# Patient Record
Sex: FEMALE | Race: WHITE | NOT HISPANIC OR LATINO | Employment: UNEMPLOYED | URBAN - METROPOLITAN AREA
[De-identification: names, ages, dates, MRNs, and addresses within clinical notes are randomized per-mention and may not be internally consistent; named-entity substitution may affect disease eponyms.]

---

## 2018-01-17 NOTE — PROCEDURES
Results/Data    Procedure: Electromyogram and Nerve Conduction Study  Indication: Left Lower Extremity   Referred by Dr Coronel Members  The procedure's were discussed with the patient  Written consent was obtained prior to the procedure and is detailed in the patient's record  Prior to the start of the procedure a time out was taken and the identity of the patient was confirmed via name and date of birth with the patient  The correct site and the procedure to be performed were confirmed  The correct side was confirmed if applicable  The positioning of the patient was verified  The availability of the correct equipment was verified  Procedure Start Time: 1:15    Technique: A sterile concentric needle electrode was used  The patient tolerated the procedure well  There were no complications  Results : Motor and sensory nerve conduction studies were performed on the peroneal , tibial and sural nerves  The peroneal and tibial compound motor action potentials were within normal limits  The peroneal and tibial F wave latencies were within normal limits  The sural sensory peak latency was within normal limits with a low sensory action potential amplitude  The right and left H soleus responses were within normal limits  Concentric needle EMG was performed on various proximal and distal muscles including gluteus medius, vastus lateralis, tibialis anterior, medial gastrocnemius, EDB, L4-5 and L5-S1 paraspinal myotomes  There was no evidence of active denervation in any of the muscles tested  Mild decreased recruitment of giant motor units was noted in the gluteus medius and EDB  The compound motor unit action potentials were of normal configuration with interference patterns being full or full for effort in the remaining muscles tested  Interpretation: There is electro physiologic evidence of a:    1   Mild chronic L5 radiculopathy as evidenced by the decreased recruitment and chronic denervation changes in the above-mentioned muscles  2  The low sural sensory amplitude is of questionable significance  It maybe secondary to technical error  Clinical correlation is recommended               Signatures   Electronically signed by : Abdifatah Vaughan MD; Aug 30 2016  1:52PM EST                       (Author)

## 2019-01-05 LAB — COLOGUARD RESULT REPORTABLE: NEGATIVE

## 2019-07-08 ENCOUNTER — HOSPITAL ENCOUNTER (EMERGENCY)
Facility: HOSPITAL | Age: 59
Discharge: HOME | End: 2019-07-09
Attending: EMERGENCY MEDICINE | Admitting: EMERGENCY MEDICINE
Payer: COMMERCIAL

## 2019-07-08 ENCOUNTER — APPOINTMENT (EMERGENCY)
Dept: RADIOLOGY | Facility: HOSPITAL | Age: 59
End: 2019-07-08
Payer: COMMERCIAL

## 2019-07-08 DIAGNOSIS — R07.9 CHEST PAIN: Primary | ICD-10-CM

## 2019-07-08 LAB
ALBUMIN SERPL BCP-MCNC: 3.5 G/DL (ref 3.5–5)
ALP SERPL-CCNC: 123 U/L (ref 46–116)
ALT SERPL W P-5'-P-CCNC: 29 U/L (ref 12–78)
ANION GAP SERPL CALCULATED.3IONS-SCNC: 7 MMOL/L (ref 4–13)
AST SERPL W P-5'-P-CCNC: 19 U/L (ref 5–45)
BASOPHILS # BLD AUTO: 0.09 THOUSANDS/ΜL (ref 0–0.1)
BASOPHILS NFR BLD AUTO: 1 % (ref 0–1)
BILIRUB SERPL-MCNC: 0.4 MG/DL (ref 0.2–1)
BUN SERPL-MCNC: 13 MG/DL (ref 5–25)
CALCIUM SERPL-MCNC: 8.9 MG/DL (ref 8.3–10.1)
CHLORIDE SERPL-SCNC: 106 MMOL/L (ref 100–108)
CO2 SERPL-SCNC: 29 MMOL/L (ref 21–32)
CREAT SERPL-MCNC: 0.97 MG/DL (ref 0.6–1.3)
DEPRECATED D DIMER PPP: 325 NG/ML (FEU)
EOSINOPHIL # BLD AUTO: 0.24 THOUSAND/ΜL (ref 0–0.61)
EOSINOPHIL NFR BLD AUTO: 3 % (ref 0–6)
ERYTHROCYTE [DISTWIDTH] IN BLOOD BY AUTOMATED COUNT: 15.2 % (ref 11.6–15.1)
GFR SERPL CREATININE-BSD FRML MDRD: 65 ML/MIN/1.73SQ M
GLUCOSE SERPL-MCNC: 73 MG/DL (ref 65–140)
HCT VFR BLD AUTO: 35.2 % (ref 34.8–46.1)
HGB BLD-MCNC: 10.7 G/DL (ref 11.5–15.4)
IMM GRANULOCYTES # BLD AUTO: 0.03 THOUSAND/UL (ref 0–0.2)
IMM GRANULOCYTES NFR BLD AUTO: 0 % (ref 0–2)
LIPASE SERPL-CCNC: 213 U/L (ref 73–393)
LYMPHOCYTES # BLD AUTO: 1.64 THOUSANDS/ΜL (ref 0.6–4.47)
LYMPHOCYTES NFR BLD AUTO: 22 % (ref 14–44)
MCH RBC QN AUTO: 21.3 PG (ref 26.8–34.3)
MCHC RBC AUTO-ENTMCNC: 30.4 G/DL (ref 31.4–37.4)
MCV RBC AUTO: 70 FL (ref 82–98)
MONOCYTES # BLD AUTO: 0.82 THOUSAND/ΜL (ref 0.17–1.22)
MONOCYTES NFR BLD AUTO: 11 % (ref 4–12)
NEUTROPHILS # BLD AUTO: 4.75 THOUSANDS/ΜL (ref 1.85–7.62)
NEUTS SEG NFR BLD AUTO: 63 % (ref 43–75)
NRBC BLD AUTO-RTO: 0 /100 WBCS
PLATELET # BLD AUTO: 296 THOUSANDS/UL (ref 149–390)
PMV BLD AUTO: 10.7 FL (ref 8.9–12.7)
POTASSIUM SERPL-SCNC: 3.7 MMOL/L (ref 3.5–5.3)
PROT SERPL-MCNC: 7 G/DL (ref 6.4–8.2)
RBC # BLD AUTO: 5.03 MILLION/UL (ref 3.81–5.12)
SODIUM SERPL-SCNC: 142 MMOL/L (ref 136–145)
TROPONIN I SERPL-MCNC: <0.02 NG/ML
WBC # BLD AUTO: 7.57 THOUSAND/UL (ref 4.31–10.16)

## 2019-07-08 PROCEDURE — 83690 ASSAY OF LIPASE: CPT | Performed by: EMERGENCY MEDICINE

## 2019-07-08 PROCEDURE — 99284 EMERGENCY DEPT VISIT MOD MDM: CPT | Performed by: EMERGENCY MEDICINE

## 2019-07-08 PROCEDURE — 80053 COMPREHEN METABOLIC PANEL: CPT | Performed by: EMERGENCY MEDICINE

## 2019-07-08 PROCEDURE — 85379 FIBRIN DEGRADATION QUANT: CPT | Performed by: EMERGENCY MEDICINE

## 2019-07-08 PROCEDURE — 84484 ASSAY OF TROPONIN QUANT: CPT | Performed by: EMERGENCY MEDICINE

## 2019-07-08 PROCEDURE — 36415 COLL VENOUS BLD VENIPUNCTURE: CPT | Performed by: EMERGENCY MEDICINE

## 2019-07-08 PROCEDURE — 93005 ELECTROCARDIOGRAM TRACING: CPT

## 2019-07-08 PROCEDURE — 85025 COMPLETE CBC W/AUTO DIFF WBC: CPT | Performed by: EMERGENCY MEDICINE

## 2019-07-08 PROCEDURE — 71046 X-RAY EXAM CHEST 2 VIEWS: CPT

## 2019-07-08 PROCEDURE — 99285 EMERGENCY DEPT VISIT HI MDM: CPT

## 2019-07-08 RX ORDER — 0.9 % SODIUM CHLORIDE 0.9 %
3 VIAL (ML) INJECTION AS NEEDED
Status: DISCONTINUED | OUTPATIENT
Start: 2019-07-08 | End: 2019-07-09 | Stop reason: HOSPADM

## 2019-07-09 VITALS
OXYGEN SATURATION: 99 % | SYSTOLIC BLOOD PRESSURE: 120 MMHG | HEART RATE: 77 BPM | TEMPERATURE: 98.3 F | RESPIRATION RATE: 18 BRPM | DIASTOLIC BLOOD PRESSURE: 55 MMHG

## 2019-07-09 LAB
ATRIAL RATE: 74 BPM
ATRIAL RATE: 87 BPM
P AXIS: 67 DEGREES
P AXIS: 78 DEGREES
PR INTERVAL: 138 MS
PR INTERVAL: 154 MS
QRS AXIS: 66 DEGREES
QRS AXIS: 71 DEGREES
QRSD INTERVAL: 66 MS
QRSD INTERVAL: 70 MS
QT INTERVAL: 348 MS
QT INTERVAL: 384 MS
QTC INTERVAL: 418 MS
QTC INTERVAL: 426 MS
T WAVE AXIS: 69 DEGREES
T WAVE AXIS: 76 DEGREES
TROPONIN I SERPL-MCNC: <0.02 NG/ML
VENTRICULAR RATE: 74 BPM
VENTRICULAR RATE: 87 BPM

## 2019-07-09 PROCEDURE — 84484 ASSAY OF TROPONIN QUANT: CPT | Performed by: EMERGENCY MEDICINE

## 2019-07-09 PROCEDURE — 93010 ELECTROCARDIOGRAM REPORT: CPT | Performed by: INTERNAL MEDICINE

## 2019-07-09 PROCEDURE — 36415 COLL VENOUS BLD VENIPUNCTURE: CPT | Performed by: EMERGENCY MEDICINE

## 2019-07-09 PROCEDURE — 93005 ELECTROCARDIOGRAM TRACING: CPT

## 2019-07-09 NOTE — ED PROVIDER NOTES
History  Chief Complaint   Patient presents with    Chest Pain     Pt presents with c/o constant dull aching CP radiating down L arm that started about , no cardiac hx     HPI  63 y o  female presents with 2 hours of substernal chest pain with radiation down the left arm  Patient describes moderate dull ache that came on suddenly while resting and continues in the ER  Patient states nothing worsens the pain and nothing improves the pain  Patient denies exertional component to her chest pain  Patient notes a history of COPD with mild worsening of her dyspnea that she states is chronic  Patient took her medications today  Patient states she felt her heart was "bounding" but denies palpitations  Patient denies fever/chills, nausea/vomiting, diaphoresis, cough, hemoptysis, weakness, dizziness, back pain, syncope, focal weakness or numbness, leg pain or swelling  All other review of systems reviewed and noted to be negative  The patient denies a history of atherosclerotic disease (CAD/TIA/CVA/PAD)  Patient denies any history of hypertension, affirms hyperlipidemia though this improved with diet and gastric sleeve, denies diabetes; denies any early family history of CAD (male less than 51yo or female less than 73 yo)  The patient denies any use of tobacco in the past 90 days  The patient denies any use of illicit drugs, including cocaine  Patient denies any immobilization of at least 3 days or surgery in the past 4 weeks  Patient denies any history of DVT or PE  Patient affirms any malignancy with treatment within the past 6 months, on letrozol      Medical Decision Making    HEART SCORE  History: 1   -Highly suspicious +2, Moderately suspicious +1   -Likelihood ratios: radiation to both arms (2 6), similar to prior ischemia (2 2), change in pattern over 24 hours (2 0)  EK (ST changes +2, Other abnormalities +1)  Age: 1 (greater 65 +2, 45-65 +1)  Risk factors: 1 (3 or more +2, 1-2 factors +1)  Troponin: 0 (greater than 3 times limit +2)  Total: 3    Patient presenting with chest pain  The patient's HEART score is 3  EKG obtained and reviewed independently by myself, which was NSR without any acute ST/T wave changes  CXR will be obtained to evaluate for alternative pathologies, including pneumothorax or pneumonia  CBC will be obtained to evaluate for leukocytosis suggestive of infectious process such as pneumonia or myocarditis  Janna Meigs' Criteria for Pulmonary Embolism  no - clinical signs or symptoms of DVT (3)  no - PE most likely diagnosis (3)  yes - Heart rate greater than 100 (1 5)  no - Immobilization at least 3 days OR surgery in the previous 4 weeks (1 5)  no - Previous, objectively diagnosed PE or DVT (1 5)  no - Hemoptysis (1)  yes - Malignancy with treatment within 6 months or palliative (1)    PERC Criteria for Pulmonary Embolism  yes - age is greater than or equal to 50  yes - Heart rate greater than 100  no - O2 sat on room air < 95%  no - Prior history of PE or DVT  no - Recent trauma or surgery  no - Hemoptysis  no - Use of exogenous estrogen  no - Unilateral leg swelling    Patient has a low risk Wells' criteria but is unable to be cleared via the [facility] criteria  As such, a D-Dimer will be ordered for the patient to evaluate for the potential for pulmonary embolism  If positive, CT imaging of the patient's chest will be obtained to evaluate for potential pulmonary embolism  CXR obtained and reviewed independently by myself; this did not demonstrate any acute abnormalities  Labs obtained and reviewed and were essentially unremarkable  Initial troponin was negative  Repeated delta troponin and EKG at three hour matthew after initial troponin was negative, indicating a low likelihood of ACS  Laboratory and radiographic findings were discussed with the patient     There is a broad differential and I considered emergent diagnoses including pericarditis, ACS, angina, PE, pneumonia, rib fracture, and PTX, but these appear less likely considering the data gathered thus far  I have instructed the patient to return to the ER at any time if there are any new or worsening symptoms  The patient expressed understanding of and agreement with this plan  Opportunity was given for questions prior to discharge and all stated questions were answered to the patient's satisfaction  Home care instructions provided  History provided by:  Patient  Chest Pain   Pain location:  Substernal area  Pain quality: aching and dull    Pain radiates to:  L arm and L shoulder  Pain severity:  Moderate  Onset quality:  Sudden  Timing:  Constant  Progression:  Unchanged  Relieved by:  None tried  Worsened by:  Nothing tried  Ineffective treatments:  None tried  Associated symptoms: shortness of breath    Associated symptoms: no abdominal pain, no altered mental status, no anorexia, no anxiety, no back pain, no claudication, no cough, no diaphoresis, no dizziness, no dysphagia, no fatigue, no fever, no headache, no heartburn, no lower extremity edema, no nausea, no near-syncope, no numbness, no orthopnea, no palpitations, no PND, no syncope, not vomiting and no weakness        None       Past Medical History:   Diagnosis Date    Emphysema lung (HCC)        Past Surgical History:   Procedure Laterality Date    APPENDECTOMY      CHOLECYSTECTOMY      GASTRIC BYPASS      ROTATOR CUFF REPAIR         History reviewed  No pertinent family history  I have reviewed and agree with the history as documented  Social History     Tobacco Use    Smoking status: Former Smoker    Smokeless tobacco: Never Used   Substance Use Topics    Alcohol use: Yes     Alcohol/week: 14 0 standard drinks     Types: 14 Glasses of wine per week    Drug use: Never        Review of Systems   Constitutional: Negative for diaphoresis, fatigue and fever  HENT: Negative for trouble swallowing      Respiratory: Positive for shortness of breath  Negative for cough  Cardiovascular: Positive for chest pain  Negative for palpitations, orthopnea, claudication, syncope, PND and near-syncope  Gastrointestinal: Negative for abdominal pain, anorexia, heartburn, nausea and vomiting  Musculoskeletal: Negative for back pain  Neurological: Negative for dizziness, weakness, numbness and headaches  Physical Exam  Physical Exam   Constitutional: She appears well-developed and well-nourished  HENT:   Head: Normocephalic and atraumatic  Mouth/Throat: Oropharynx is clear and moist    Eyes: Pupils are equal, round, and reactive to light  Neck: Neck supple  Cardiovascular: Normal rate, regular rhythm, intact distal pulses and normal pulses  Exam reveals no S3    Pulmonary/Chest: Effort normal  No accessory muscle usage or stridor  No tachypnea  No respiratory distress  She has no decreased breath sounds  She has no wheezes  She has no rhonchi  She has no rales  Abdominal: Soft  Musculoskeletal:        Right lower leg: Normal  She exhibits no tenderness and no edema  Left lower leg: Normal  She exhibits no tenderness and no edema  Neurological: She is alert  Skin: Skin is warm and dry  Psychiatric: She has a normal mood and affect  Vitals reviewed        Vital Signs  ED Triage Vitals [07/08/19 2137]   Temperature Pulse Respirations Blood Pressure SpO2   98 3 °F (36 8 °C) 92 20 122/67 97 %      Temp Source Heart Rate Source Patient Position - Orthostatic VS BP Location FiO2 (%)   Oral Monitor Sitting Right arm --      Pain Score       No Pain           Vitals:    07/08/19 2137 07/09/19 0042   BP: 122/67 120/55   Pulse: 92 77   Patient Position - Orthostatic VS: Sitting          Visual Acuity      ED Medications  Medications   sodium chloride (PF) 0 9 % injection 3 mL (has no administration in time range)       Diagnostic Studies  Results Reviewed     Procedure Component Value Units Date/Time    Troponin I [[id number]] (Normal) Collected:  07/09/19 0140    Lab Status:  Final result Specimen:  Blood from Arm, Right Updated:  07/09/19 0203     Troponin I <0 02 ng/mL     Troponin I [[id number]]  (Normal) Collected:  07/08/19 2245    Lab Status:  Final result Specimen:  Blood from Arm, Right Updated:  07/08/19 2311     Troponin I <0 02 ng/mL     Comprehensive metabolic panel [[id number]]  (Abnormal) Collected:  07/08/19 2245    Lab Status:  Final result Specimen:  Blood from Arm, Right Updated:  07/08/19 2309     Sodium 142 mmol/L      Potassium 3 7 mmol/L      Chloride 106 mmol/L      CO2 29 mmol/L      ANION GAP 7 mmol/L      BUN 13 mg/dL      Creatinine 0 97 mg/dL      Glucose 73 mg/dL      Calcium 8 9 mg/dL      AST 19 U/L      ALT 29 U/L      Alkaline Phosphatase 123 U/L      Total Protein 7 0 g/dL      Albumin 3 5 g/dL      Total Bilirubin 0 40 mg/dL      eGFR 65 ml/min/1 73sq m     Narrative:       [facility] guidelines for Chronic Kidney Disease (CKD):     Stage 1 with normal or high GFR (GFR > 90 mL/min/1 73 square meters)    Stage 2 Mild CKD (GFR = 60-89 mL/min/1 73 square meters)    Stage 3A Moderate CKD (GFR = 45-59 mL/min/1 73 square meters)    Stage 3B Moderate CKD (GFR = 30-44 mL/min/1 73 square meters)    Stage 4 Severe CKD (GFR = 15-29 mL/min/1 73 square meters)    Stage 5 End Stage CKD (GFR <15 mL/min/1 73 square meters)  Note: GFR calculation is accurate only with a steady state creatinine    Lipase [[id number]]  (Normal) Collected:  07/08/19 2245    Lab Status:  Final result Specimen:  Blood from Arm, Right Updated:  07/08/19 2309     Lipase 213 u/L     D-dimer, quantitative [[id number]]  (Normal) Collected:  07/08/19 2245    Lab Status:  Final result Specimen:  Blood from Arm, Right Updated:  07/08/19 2306     D-Dimer, Quant 325 ng/ml (FEU)     CBC and differential [[id number]]  (Abnormal) Collected:  07/08/19 2245    Lab Status:  Final result Specimen:  Blood from Arm, Right Updated: 07/08/19 2251     WBC 7 57 Thousand/uL      RBC 5 03 Million/uL      Hemoglobin 10 7 g/dL      Hematocrit 35 2 %      MCV 70 fL      MCH 21 3 pg      MCHC 30 4 g/dL      RDW 15 2 %      MPV 10 7 fL      Platelets 534 Thousands/uL      nRBC 0 /100 WBCs      Neutrophils Relative 63 %      Immat GRANS % 0 %      Lymphocytes Relative 22 %      Monocytes Relative 11 %      Eosinophils Relative 3 %      Basophils Relative 1 %      Neutrophils Absolute 4 75 Thousands/µL      Immature Grans Absolute 0 03 Thousand/uL      Lymphocytes Absolute 1 64 Thousands/µL      Monocytes Absolute 0 82 Thousand/µL      Eosinophils Absolute 0 24 Thousand/µL      Basophils Absolute 0 09 Thousands/µL                  X-ray chest 2 views   ED Interpretation by Sabra Kimbrough MD (07/09 0216)   No acute findings  Procedures  Procedures       ED Course  ED Course as of Jul 09 0217 Mon Jul 08, 2019   2327 EKG demonstrates normal sinus rhythm with no acute ST segment changes  Tue Jul 09, 2019   0146 Repeat EKG with normal sinus rhythm with no acute ST segment changes  0147 Patient currently asymptomatic  Discussed findings with the patient and the need for follow-up  Discussed options regarding stress testing after discussion, she prefers follow-up with cardiology for discussion of stress testing  Discussed return precautions in detail  MDM    Disposition  Final diagnoses:   Chest pain     Time reflects when diagnosis was documented in both MDM as applicable and the Disposition within this note     Time User Action Codes Description Comment    7/9/2019  1:47 AM Ronny Santos Add [R07 9] Chest pain       ED Disposition     ED Disposition Condition Date/Time Comment    Discharge Stable Tue Jul 9, 2019  1:47 AM Carmen Thrasher discharge to home/self care              Follow-up Information     Follow up With Specialties Details Why Contact Info [facility] Schedule an appointment as soon as possible for a visit  Follow-up and reassessment, discussion of stress testing  [address] [address] Cardiology [address] [facility] [address]  [address] [place], [place], Viktoria Carr [address] Emergency Department Emergency Medicine Go to  If symptoms worsen 34 [facility] 149 ED, [address] [place] [address], [facility]          Patient's Medications    No medications on file     No discharge procedures on file      ED Provider  Electronically Signed by           Uriel Gallo MD  07/09/19 8476

## 2019-07-09 NOTE — ED NOTES
Discharged reviewed with pt  Pt verbalized understanding and has no further questions at this time  Pt ambulatory off unit with steady gait       Saniya Teixeira RN  07/09/19 4109

## 2021-03-30 DIAGNOSIS — Z23 ENCOUNTER FOR IMMUNIZATION: ICD-10-CM

## 2021-10-12 ENCOUNTER — TELEPHONE (OUTPATIENT)
Dept: NEUROLOGY | Facility: CLINIC | Age: 61
End: 2021-10-12

## 2021-11-12 ENCOUNTER — OFFICE VISIT (OUTPATIENT)
Dept: FAMILY MEDICINE CLINIC | Facility: CLINIC | Age: 61
End: 2021-11-12
Payer: MEDICARE

## 2021-11-12 ENCOUNTER — TELEPHONE (OUTPATIENT)
Dept: ADMINISTRATIVE | Facility: OTHER | Age: 61
End: 2021-11-12

## 2021-11-12 VITALS
HEIGHT: 63 IN | WEIGHT: 174 LBS | TEMPERATURE: 97.3 F | HEART RATE: 103 BPM | BODY MASS INDEX: 30.83 KG/M2 | OXYGEN SATURATION: 96 % | DIASTOLIC BLOOD PRESSURE: 80 MMHG | SYSTOLIC BLOOD PRESSURE: 118 MMHG

## 2021-11-12 DIAGNOSIS — J44.9 CHRONIC OBSTRUCTIVE PULMONARY DISEASE, UNSPECIFIED COPD TYPE (HCC): ICD-10-CM

## 2021-11-12 DIAGNOSIS — E06.3 HASHIMOTO'S THYROIDITIS: ICD-10-CM

## 2021-11-12 DIAGNOSIS — D32.9 MENINGIOMA (HCC): ICD-10-CM

## 2021-11-12 DIAGNOSIS — R73.03 PREDIABETES: ICD-10-CM

## 2021-11-12 DIAGNOSIS — K21.9 GASTROESOPHAGEAL REFLUX DISEASE, UNSPECIFIED WHETHER ESOPHAGITIS PRESENT: ICD-10-CM

## 2021-11-12 DIAGNOSIS — Z91.09 ENVIRONMENTAL ALLERGIES: ICD-10-CM

## 2021-11-12 DIAGNOSIS — M79.7 FIBROMYALGIA: ICD-10-CM

## 2021-11-12 DIAGNOSIS — Z76.89 ENCOUNTER TO ESTABLISH CARE WITH NEW DOCTOR: Primary | ICD-10-CM

## 2021-11-12 DIAGNOSIS — Z85.3 HX OF BREAST CANCER: ICD-10-CM

## 2021-11-12 DIAGNOSIS — G43.709 CHRONIC MIGRAINE WITHOUT AURA WITHOUT STATUS MIGRAINOSUS, NOT INTRACTABLE: ICD-10-CM

## 2021-11-12 DIAGNOSIS — G89.29 CHRONIC LEFT-SIDED LOW BACK PAIN WITH LEFT-SIDED SCIATICA: ICD-10-CM

## 2021-11-12 DIAGNOSIS — Z12.4 CERVICAL CANCER SCREENING: ICD-10-CM

## 2021-11-12 DIAGNOSIS — M06.00 SERONEGATIVE RHEUMATOID ARTHRITIS (HCC): ICD-10-CM

## 2021-11-12 DIAGNOSIS — R09.82 PND (POST-NASAL DRIP): ICD-10-CM

## 2021-11-12 DIAGNOSIS — M54.2 BILATERAL NECK PAIN: ICD-10-CM

## 2021-11-12 DIAGNOSIS — M54.42 CHRONIC LEFT-SIDED LOW BACK PAIN WITH LEFT-SIDED SCIATICA: ICD-10-CM

## 2021-11-12 DIAGNOSIS — E78.5 HYPERLIPIDEMIA, UNSPECIFIED HYPERLIPIDEMIA TYPE: ICD-10-CM

## 2021-11-12 PROBLEM — E78.2 MIXED HYPERLIPIDEMIA: Status: ACTIVE | Noted: 2018-03-06

## 2021-11-12 PROBLEM — E11.9 DM TYPE 2, GOAL: SYMPTOM MGMT (HCC): Status: ACTIVE | Noted: 2021-11-12

## 2021-11-12 PROBLEM — R73.01 IFG (IMPAIRED FASTING GLUCOSE): Status: ACTIVE | Noted: 2018-01-15

## 2021-11-12 PROBLEM — E11.9 DM TYPE 2, GOAL: SYMPTOM MGMT (HCC): Status: RESOLVED | Noted: 2021-11-12 | Resolved: 2021-11-12

## 2021-11-12 PROCEDURE — 99204 OFFICE O/P NEW MOD 45 MIN: CPT | Performed by: FAMILY MEDICINE

## 2021-11-12 RX ORDER — LETROZOLE 2.5 MG/1
TABLET, FILM COATED ORAL
COMMUNITY
End: 2021-12-16 | Stop reason: ALTCHOICE

## 2021-11-12 RX ORDER — CETIRIZINE HYDROCHLORIDE 10 MG/1
10 TABLET, CHEWABLE ORAL DAILY
Qty: 90 TABLET | Refills: 1 | Status: SHIPPED | OUTPATIENT
Start: 2021-11-12 | End: 2022-04-01

## 2021-11-12 RX ORDER — VENLAFAXINE HYDROCHLORIDE 75 MG/1
1 CAPSULE, EXTENDED RELEASE ORAL DAILY
COMMUNITY
Start: 2021-10-28

## 2021-11-12 RX ORDER — ONDANSETRON 4 MG/1
TABLET, ORALLY DISINTEGRATING ORAL 2 TIMES DAILY PRN
COMMUNITY

## 2021-11-12 RX ORDER — LIDOCAINE AND PRILOCAINE 25; 25 MG/G; MG/G
CREAM TOPICAL
COMMUNITY
Start: 2021-08-10

## 2021-11-12 RX ORDER — ALBUTEROL SULFATE 90 UG/1
2 AEROSOL, METERED RESPIRATORY (INHALATION) EVERY 6 HOURS PRN
COMMUNITY
End: 2021-12-16 | Stop reason: SDUPTHER

## 2021-11-12 RX ORDER — FLUTICASONE PROPIONATE 50 MCG
SPRAY, SUSPENSION (ML) NASAL
COMMUNITY

## 2021-11-12 RX ORDER — GABAPENTIN 300 MG/1
900 CAPSULE ORAL DAILY
COMMUNITY
Start: 2021-10-23

## 2021-11-12 RX ORDER — LETROZOLE 2.5 MG/1
2.5 TABLET, FILM COATED ORAL DAILY
COMMUNITY
Start: 2021-09-23

## 2021-11-12 RX ORDER — IBUPROFEN 800 MG/1
800 TABLET ORAL AS NEEDED
COMMUNITY

## 2021-11-12 RX ORDER — MAGNESIUM OXIDE 400 MG/1
TABLET ORAL 2 TIMES DAILY
COMMUNITY

## 2021-11-12 RX ORDER — PHENTERMINE HYDROCHLORIDE 37.5 MG/1
37.5 CAPSULE ORAL
COMMUNITY
Start: 2021-06-02 | End: 2022-06-17 | Stop reason: ALTCHOICE

## 2021-11-12 RX ORDER — OMEPRAZOLE 40 MG/1
CAPSULE, DELAYED RELEASE ORAL
COMMUNITY
Start: 2021-08-28 | End: 2022-04-04 | Stop reason: SDUPTHER

## 2021-11-12 RX ORDER — CLONAZEPAM 0.5 MG/1
0.5 TABLET ORAL DAILY PRN
COMMUNITY
Start: 2021-06-02

## 2021-11-12 RX ORDER — LEVOTHYROXINE SODIUM 88 UG/1
TABLET ORAL
COMMUNITY
Start: 2021-11-11 | End: 2022-06-17 | Stop reason: ALTCHOICE

## 2021-11-12 RX ORDER — MONTELUKAST SODIUM 10 MG/1
10 TABLET ORAL
COMMUNITY
Start: 2021-06-02

## 2021-11-12 RX ORDER — BIOTIN 1 MG
1000 TABLET ORAL DAILY
COMMUNITY

## 2021-11-12 RX ORDER — PREDNISONE 10 MG/1
10 TABLET ORAL DAILY
COMMUNITY
Start: 2021-10-17 | End: 2022-06-17

## 2021-11-18 ENCOUNTER — TELEPHONE (OUTPATIENT)
Dept: FAMILY MEDICINE CLINIC | Facility: CLINIC | Age: 61
End: 2021-11-18

## 2021-12-03 ENCOUNTER — RA CDI HCC (OUTPATIENT)
Dept: OTHER | Facility: HOSPITAL | Age: 61
End: 2021-12-03

## 2021-12-08 LAB
ALBUMIN SERPL-MCNC: 4.1 G/DL (ref 3.8–4.9)
ALBUMIN/GLOB SERPL: 1.6 {RATIO} (ref 1.2–2.2)
ALP SERPL-CCNC: 86 IU/L (ref 44–121)
ALT SERPL-CCNC: 34 IU/L (ref 0–32)
AST SERPL-CCNC: 20 IU/L (ref 0–40)
BASOPHILS # BLD AUTO: 0.1 X10E3/UL (ref 0–0.2)
BASOPHILS NFR BLD AUTO: 2 %
BILIRUB SERPL-MCNC: 0.4 MG/DL (ref 0–1.2)
BUN SERPL-MCNC: 14 MG/DL (ref 8–27)
BUN/CREAT SERPL: 14 (ref 12–28)
CALCIUM SERPL-MCNC: 8.9 MG/DL (ref 8.7–10.3)
CHLORIDE SERPL-SCNC: 105 MMOL/L (ref 96–106)
CHOLEST SERPL-MCNC: 201 MG/DL (ref 100–199)
CO2 SERPL-SCNC: 23 MMOL/L (ref 20–29)
CREAT SERPL-MCNC: 0.99 MG/DL (ref 0.57–1)
EOSINOPHIL # BLD AUTO: 0.2 X10E3/UL (ref 0–0.4)
EOSINOPHIL NFR BLD AUTO: 4 %
ERYTHROCYTE [DISTWIDTH] IN BLOOD BY AUTOMATED COUNT: 16.2 % (ref 11.7–15.4)
EST. AVERAGE GLUCOSE BLD GHB EST-MCNC: 111 MG/DL
GLOBULIN SER-MCNC: 2.5 G/DL (ref 1.5–4.5)
GLUCOSE SERPL-MCNC: 90 MG/DL (ref 65–99)
HBA1C MFR BLD: 5.5 % (ref 4.8–5.6)
HCT VFR BLD AUTO: 33.1 % (ref 34–46.6)
HDLC SERPL-MCNC: 45 MG/DL
HGB BLD-MCNC: 10.5 G/DL (ref 11.1–15.9)
IMM GRANULOCYTES # BLD: 0 X10E3/UL (ref 0–0.1)
IMM GRANULOCYTES NFR BLD: 0 %
LDLC SERPL CALC-MCNC: 129 MG/DL (ref 0–99)
LYMPHOCYTES # BLD AUTO: 1.5 X10E3/UL (ref 0.7–3.1)
LYMPHOCYTES NFR BLD AUTO: 28 %
MCH RBC QN AUTO: 22.9 PG (ref 26.6–33)
MCHC RBC AUTO-ENTMCNC: 31.7 G/DL (ref 31.5–35.7)
MCV RBC AUTO: 72 FL (ref 79–97)
MONOCYTES # BLD AUTO: 0.5 X10E3/UL (ref 0.1–0.9)
MONOCYTES NFR BLD AUTO: 10 %
NEUTROPHILS # BLD AUTO: 3.1 X10E3/UL (ref 1.4–7)
NEUTROPHILS NFR BLD AUTO: 56 %
PLATELET # BLD AUTO: 310 X10E3/UL (ref 150–450)
POTASSIUM SERPL-SCNC: 4.2 MMOL/L (ref 3.5–5.2)
PROT SERPL-MCNC: 6.6 G/DL (ref 6–8.5)
RBC # BLD AUTO: 4.59 X10E6/UL (ref 3.77–5.28)
SL AMB EGFR AFRICAN AMERICAN: 72 ML/MIN/1.73
SL AMB EGFR NON AFRICAN AMERICAN: 62 ML/MIN/1.73
SL AMB T4, FREE (DIRECT): 1.1 NG/DL (ref 0.82–1.77)
SL AMB VLDL CHOLESTEROL CALC: 27 MG/DL (ref 5–40)
SODIUM SERPL-SCNC: 140 MMOL/L (ref 134–144)
TRIGL SERPL-MCNC: 150 MG/DL (ref 0–149)
TSH SERPL DL<=0.005 MIU/L-ACNC: 9.83 UIU/ML (ref 0.45–4.5)
WBC # BLD AUTO: 5.5 X10E3/UL (ref 3.4–10.8)

## 2021-12-15 ENCOUNTER — CONSULT (OUTPATIENT)
Dept: PAIN MEDICINE | Facility: CLINIC | Age: 61
End: 2021-12-15
Payer: MEDICARE

## 2021-12-15 VITALS
SYSTOLIC BLOOD PRESSURE: 117 MMHG | WEIGHT: 174 LBS | DIASTOLIC BLOOD PRESSURE: 74 MMHG | BODY MASS INDEX: 30.83 KG/M2 | HEART RATE: 89 BPM | HEIGHT: 63 IN

## 2021-12-15 DIAGNOSIS — M54.42 CHRONIC LEFT-SIDED LOW BACK PAIN WITH LEFT-SIDED SCIATICA: Primary | ICD-10-CM

## 2021-12-15 DIAGNOSIS — M79.7 MUSCLE PAIN, FIBROMYALGIA: ICD-10-CM

## 2021-12-15 DIAGNOSIS — G89.4 CHRONIC PAIN SYNDROME: ICD-10-CM

## 2021-12-15 DIAGNOSIS — G89.29 CHRONIC LEFT-SIDED LOW BACK PAIN WITH LEFT-SIDED SCIATICA: Primary | ICD-10-CM

## 2021-12-15 PROCEDURE — 99204 OFFICE O/P NEW MOD 45 MIN: CPT | Performed by: STUDENT IN AN ORGANIZED HEALTH CARE EDUCATION/TRAINING PROGRAM

## 2021-12-16 ENCOUNTER — OFFICE VISIT (OUTPATIENT)
Dept: FAMILY MEDICINE CLINIC | Facility: CLINIC | Age: 61
End: 2021-12-16
Payer: MEDICARE

## 2021-12-16 VITALS
OXYGEN SATURATION: 96 % | DIASTOLIC BLOOD PRESSURE: 74 MMHG | WEIGHT: 172 LBS | HEART RATE: 74 BPM | TEMPERATURE: 97 F | SYSTOLIC BLOOD PRESSURE: 118 MMHG | HEIGHT: 63 IN | BODY MASS INDEX: 30.48 KG/M2

## 2021-12-16 DIAGNOSIS — D32.9 MENINGIOMA (HCC): ICD-10-CM

## 2021-12-16 DIAGNOSIS — Z00.00 WELCOME TO MEDICARE PREVENTIVE VISIT: Primary | ICD-10-CM

## 2021-12-16 DIAGNOSIS — M06.00 SERONEGATIVE RHEUMATOID ARTHRITIS (HCC): ICD-10-CM

## 2021-12-16 DIAGNOSIS — J44.9 CHRONIC OBSTRUCTIVE PULMONARY DISEASE, UNSPECIFIED COPD TYPE (HCC): ICD-10-CM

## 2021-12-16 DIAGNOSIS — K21.9 GASTROESOPHAGEAL REFLUX DISEASE, UNSPECIFIED WHETHER ESOPHAGITIS PRESENT: ICD-10-CM

## 2021-12-16 DIAGNOSIS — R73.03 PREDIABETES: ICD-10-CM

## 2021-12-16 PROCEDURE — G0403 EKG FOR INITIAL PREVENT EXAM: HCPCS | Performed by: FAMILY MEDICINE

## 2021-12-16 PROCEDURE — G0402 INITIAL PREVENTIVE EXAM: HCPCS | Performed by: FAMILY MEDICINE

## 2021-12-16 PROCEDURE — 99214 OFFICE O/P EST MOD 30 MIN: CPT | Performed by: FAMILY MEDICINE

## 2021-12-16 RX ORDER — FOLIC ACID 1 MG/1
1 TABLET ORAL DAILY
COMMUNITY
Start: 2021-12-14

## 2021-12-16 RX ORDER — ALBUTEROL SULFATE 90 UG/1
2 AEROSOL, METERED RESPIRATORY (INHALATION) EVERY 6 HOURS PRN
Qty: 8 G | Refills: 2 | Status: SHIPPED | OUTPATIENT
Start: 2021-12-16 | End: 2022-01-11

## 2021-12-17 ENCOUNTER — TELEPHONE (OUTPATIENT)
Dept: ADMINISTRATIVE | Facility: OTHER | Age: 61
End: 2021-12-17

## 2022-01-10 DIAGNOSIS — J44.9 CHRONIC OBSTRUCTIVE PULMONARY DISEASE, UNSPECIFIED COPD TYPE (HCC): ICD-10-CM

## 2022-01-11 RX ORDER — ALBUTEROL SULFATE 90 UG/1
2 AEROSOL, METERED RESPIRATORY (INHALATION) EVERY 4 HOURS PRN
Qty: 8 G | Refills: 2 | Status: SHIPPED | OUTPATIENT
Start: 2022-01-11 | End: 2022-03-24 | Stop reason: SDUPTHER

## 2022-01-20 ENCOUNTER — HOSPITAL ENCOUNTER (OUTPATIENT)
Dept: MRI IMAGING | Facility: CLINIC | Age: 62
Discharge: HOME | End: 2022-01-20
Payer: MEDICARE

## 2022-01-20 DIAGNOSIS — G89.29 CHRONIC LEFT-SIDED LOW BACK PAIN WITH LEFT-SIDED SCIATICA: ICD-10-CM

## 2022-01-20 DIAGNOSIS — M54.42 CHRONIC LEFT-SIDED LOW BACK PAIN WITH LEFT-SIDED SCIATICA: ICD-10-CM

## 2022-01-20 PROCEDURE — G1004 CDSM NDSC: HCPCS

## 2022-01-20 PROCEDURE — 72148 MRI LUMBAR SPINE W/O DYE: CPT

## 2022-01-24 ENCOUNTER — TELEPHONE (OUTPATIENT)
Dept: RADIOLOGY | Facility: CLINIC | Age: 62
End: 2022-01-24

## 2022-01-24 NOTE — TELEPHONE ENCOUNTER
----- Message from Surendra Porter MD sent at 1/21/2022  4:31 PM EST -----  MRI shows multilevel degenerative changes in the spine  At her L5-S1 level, she has a bone spur that looks like it might be irritating the exiting nerve  She is candidate for left L4 and L5 TFESI which I ordered in case she would like to proceed

## 2022-01-24 NOTE — TELEPHONE ENCOUNTER
Advised pt of same and scheduled injection 2/3 at 10am  Preprocedure instructions reviewed  Denies abx, blood thinners and recent vaccinations

## 2022-01-28 ENCOUNTER — TELEPHONE (OUTPATIENT)
Dept: NEUROLOGY | Facility: CLINIC | Age: 62
End: 2022-01-28

## 2022-01-31 ENCOUNTER — TELEPHONE (OUTPATIENT)
Dept: RADIOLOGY | Facility: CLINIC | Age: 62
End: 2022-01-31

## 2022-02-01 ENCOUNTER — OFFICE VISIT (OUTPATIENT)
Dept: NEUROLOGY | Facility: CLINIC | Age: 62
End: 2022-02-01
Payer: MEDICARE

## 2022-02-01 VITALS
BODY MASS INDEX: 31 KG/M2 | SYSTOLIC BLOOD PRESSURE: 110 MMHG | DIASTOLIC BLOOD PRESSURE: 68 MMHG | HEIGHT: 63 IN | HEART RATE: 104 BPM | TEMPERATURE: 96.4 F | OXYGEN SATURATION: 97 %

## 2022-02-01 DIAGNOSIS — G43.109 MIGRAINE WITH AURA AND WITHOUT STATUS MIGRAINOSUS, NOT INTRACTABLE: Primary | ICD-10-CM

## 2022-02-01 DIAGNOSIS — D32.9 MENINGIOMA (HCC): ICD-10-CM

## 2022-02-01 DIAGNOSIS — E23.7 ABNORMALITY OF PITUITARY GLAND (HCC): ICD-10-CM

## 2022-02-01 DIAGNOSIS — G95.9 CERVICAL MYELOPATHY (HCC): ICD-10-CM

## 2022-02-01 PROCEDURE — 99204 OFFICE O/P NEW MOD 45 MIN: CPT | Performed by: PSYCHIATRY & NEUROLOGY

## 2022-02-01 RX ORDER — SUMATRIPTAN 25 MG/1
TABLET, FILM COATED ORAL
Qty: 9 TABLET | Refills: 5 | Status: SHIPPED | OUTPATIENT
Start: 2022-02-01

## 2022-02-01 NOTE — LETTER
February 1, 2022     [facility], [address] [facility]    Patient: Latha Ann   YOB: 1960   Date of Visit: 2/1/2022       Dear Dr Berenice Tam:    Thank you for referring Latha Ann to me for evaluation  Below are my notes for this consultation  If you have questions, please do not hesitate to call me  I look forward to following your patient along with you  Sincerely,        Bertin Lynn MD        CC: No Recipients  Bertin Lynn MD  2/1/2022  9:47 AM  Incomplete  Latha Ann is a 62 y o  female presents today with a complaint of migraine headache and meningioma    Assessment:  1  Meningioma ([address] )    2  Chronic migraine without aura without status migrainosus, not intractable        Plan:  MRI brain attention pituitary gland   MRI cervical spine   Blood work   Continue Imitrex as needed   Follow-up 3 months    Discussion:  Russell Hui has a history of migraine with aura that presently is occurring about 2 or 3 times a month and finds the 25 mg of Imitrex is effective in aborting her headache quickly  She will continue current management  She had a brain MRI done about 2 and half years ago that demonstrated meningioma in the right frontal region as well as an abnormality in the pituitary gland  Follow-up imaging has not been completed since that time  Have recommended MRI brain attention pituitary with without contrast   She also has findings causing concern for cervical myelopathy and does have neck pain issues  Have recommended MRI of the cervical spine as well and I will see her back in follow-up on these are completed  Subjective:    HPI  Russell Hui is a right-handed woman who presents today with the above complaints  States for the past 3 or 4 years she has been getting headaches    She states her headaches had been preceded by a tunneling of her vision as well as some nausea and dizziness that would last for about 10 or 15 minutes and then be followed by a bifrontal headache  She states that more recently she has not been getting these warnings but typically just gets the headache  She states the headache sometimes lateralizes to 1 side or the other but is typically in the frontal region  She describes a pressure type pain without significant photophobia, sonophobia or nausea  She states that she was seen by a neurologist in the [place] [address] about 2 or 3 years ago and was prescribed Imitrex 25 mg  She has found that this is effective in stopping her headache usually within a half an hour to 1 hour and she tolerates it well  She had been on riboflavin but is currently not taking this  She states that presently she gets about 2-3 headaches per month  Sometimes her headaches are precipitated by neck pain but is otherwise not noted any significant triggering factors  She states that her daughter has a history of bad migraine headaches  She did have an MRI of her brain done 2 and half years ago in the [place] [address] which demonstrated a 6 mm meningioma in the right frontal region as well as a 3 mm abnormality in the pituitary gland consistent with either a cyst or adenoma  She was seen by neurosurgery subsequent to this and follow-up in 2 years was recommended but never completed  She was also seen by Ophthalmology who found narrow angle glaucoma and this was treated with laser  No other ocular abnormalities were noted        Past Medical History:   Diagnosis Date    COPD (chronic obstructive pulmonary disease) (HCC)     Emphysema lung (HCC)     Fibromyalgia     GERD (gastroesophageal reflux disease)     Hashimoto's thyroiditis     History of breast cancer     Rheumatoid arthritis involving multiple sites [facility])        Family History:  Family History   Problem Relation Age of Onset    Skin cancer Mother     Alzheimer's disease Mother     Cirrhosis Father     Coronary artery disease Brother     Cirrhosis Brother  Breast cancer Maternal Grandmother        Past Surgical History:  Past Surgical History:   Procedure Laterality Date    APPENDECTOMY      BREAST LUMPECTOMY Left     CHOLECYSTECTOMY      GASTRIC BYPASS      ROTATOR CUFF REPAIR      SHOULDER SURGERY Right     Muscle tendon transfer        Social History:   reports that she has quit smoking  She has never used smokeless tobacco  She reports current alcohol use of about 14 0 standard drinks of alcohol per week  She reports that she does not use drugs  Allergies:  Ciprofloxacin, Keflex [cephalexin], Penicillins, and Sulfa antibiotics      Current Outpatient Medications:     albuterol (Ventolin HFA) 90 mcg/act inhaler, Inhale 2 puffs every 4 (four) hours as needed for wheezing or shortness of breath, Disp: 8 g, Rfl: 2    Biotin 1000 MCG tablet, Take 1,000 mcg by mouth daily, Disp: , Rfl:     cetirizine (ZyrTEC) 10 MG chewable tablet, Chew 1 tablet (10 mg total) daily, Disp: 90 tablet, Rfl: 1    Cholecalciferol 25 MCG (1000 UT) tablet, Take by mouth, Disp: , Rfl:     clonazePAM (KlonoPIN) 0 5 mg tablet, Take 0 5 mg by mouth, Disp: , Rfl:     Cyanocobalamin (VITAMIN B-12 PO), Take by mouth, Disp: , Rfl:     Diclofenac Epolamine (Flector) 1 3 % PTCH, Flector 1 3 % transdermal 12 hour patch, Disp: , Rfl:     fluticasone (FLONASE) 50 mcg/act nasal spray, fluticasone propionate 50 mcg/act susp, Disp: , Rfl:     folic acid (FOLVITE) 1 mg tablet, Take 1 tablet by mouth daily except on Wednesdays   , Disp: , Rfl:     gabapentin (NEURONTIN) 300 mg capsule, Take 900 mg by mouth daily  , Disp: , Rfl:     ibuprofen (MOTRIN) 800 mg tablet, ibuprofen 800 mg tabs, Disp: , Rfl:     letrozole (FEMARA) 2 5 mg tablet, Take 2 5 mg by mouth daily, Disp: , Rfl:     levothyroxine 88 mcg tablet, , Disp: , Rfl:     lidocaine-prilocaine (EMLA) cream, Apply topically, Disp: , Rfl:     methotrexate 2 5 mg tablet, methotrexate 2 5 mg tabs, Disp: , Rfl:     montelukast (SINGULAIR) 10 mg tablet, Take 10 mg by mouth, Disp: , Rfl:     nystatin-triamcinolone (MYCOLOG-II) cream, nystatin-triamcinolone 100,000 unit/g-0 1 % topical cream, Disp: , Rfl:     omeprazole (PriLOSEC) 40 MG capsule, , Disp: , Rfl:     predniSONE 10 mg tablet, Take 10 mg by mouth daily  , Disp: , Rfl:     venlafaxine (EFFEXOR-XR) 75 mg 24 hr capsule, Take 1 capsule by mouth daily, Disp: , Rfl:     magnesium oxide (MAG-OX) 400 mg tablet, Take by mouth 2 (two) times a day (Patient not taking: Reported on 2/1/2022 ), Disp: , Rfl:     phentermine 37 5 MG capsule, Take 37 5 mg by mouth (Patient not taking: Reported on 12/16/2021 ), Disp: , Rfl:     I have reviewed the past medical, social and family history, current medications, allergies, vitals, review of systems and updated this information as appropriate today     Objective:    Vitals:  Blood pressure 110/68, pulse 104, temperature (!) 96 4 °F (35 8 °C), temperature source Temporal, height 5' 3" (1 6 m), SpO2 97 %, not currently breastfeeding  Physical Exam    Neurological Exam    GENERAL:  Cooperative in no acute distress  Well-developed and well-nourished    HEAD and NECK   Head is atraumatic normocephalic with no lesions or masses  Neck is supple with full range of motion    CARDIOVASCULAR  Carotid Arteries-no carotid bruits  NEUROLOGIC:  Mental Status-the patient is awake alert and oriented without aphasia or apraxia  Cranial Nerves: Visual fields are full to confrontation  Extraocular movements are full without nystagmus  Pupils are 2-1/2 mm and reactive  Face is symmetrical to light touch  Movements of facial expression move symmetrically  Hearing is normal to finger rub bilaterally  Soft palate lifts symmetrically  Shoulder shrug is symmetrical  Tongue is midline without atrophy  Motor: No drift is noted on arm extension  Strength is full in the upper and lower extremities with normal bulk and tone    Sensory: Intact to temperature and vibratory sensation in the upper and lower extremities bilaterally  Cortical function is intact  Coordination: Finger to nose testing is performed accurately with minimal end tremor  Romberg is remarkable for increased sway with eyes closed  Gait reveals a normal base with symmetrical arm swing  Tandem walk is performed with some difficulty, stepping off  Reflexes:  3+ in the biceps, triceps brachioradialis and knee jerk regions with crossed adductors  2+ at the ankles  Toes are +/-upgoing  Matthew sign are positive bilaterally            ROS:    Review of Systems   Constitutional: Negative  Negative for appetite change and fever  HENT: Negative  Negative for hearing loss, tinnitus, trouble swallowing and voice change  Eyes: Negative  Negative for photophobia and pain  Respiratory: Negative  Negative for shortness of breath  Cardiovascular: Negative  Negative for palpitations  Gastrointestinal: Negative  Negative for nausea and vomiting  Endocrine: Negative  Negative for cold intolerance  Genitourinary: Negative  Negative for dysuria, frequency and urgency  Musculoskeletal: Positive for back pain and neck pain  Negative for myalgias  Skin: Negative  Negative for rash  Neurological: Positive for headaches  Negative for dizziness, tremors, seizures, syncope, facial asymmetry, speech difficulty, weakness, light-headedness and numbness  Hematological: Bruises/bleeds easily  Psychiatric/Behavioral: Positive for sleep disturbance  Negative for confusion and hallucinations

## 2022-02-01 NOTE — PROGRESS NOTES
Kavin Pham is a 62 y o  female presents today with a complaint of migraine headache and meningioma    Assessment:  1  Meningioma ([address] )    2  Chronic migraine without aura without status migrainosus, not intractable        Plan:  MRI brain attention pituitary gland   MRI cervical spine   Blood work   Continue Imitrex as needed   Follow-up 3 months    Discussion:  Mona Brown has a history of migraine with aura that presently is occurring about 2 or 3 times a month and finds the 25 mg of Imitrex is effective in aborting her headache quickly  She will continue current management  She had a brain MRI done about 2 and half years ago that demonstrated meningioma in the right frontal region as well as an abnormality in the pituitary gland  Follow-up imaging has not been completed since that time  Have recommended MRI brain attention pituitary with without contrast   She also has findings causing concern for cervical myelopathy and does have neck pain issues  Have recommended MRI of the cervical spine as well and I will see her back in follow-up on these are completed  Subjective:    HPI  Mona Brown is a right-handed woman who presents today with the above complaints  States for the past 3 or 4 years she has been getting headaches  She states her headaches had been preceded by a tunneling of her vision as well as some nausea and dizziness that would last for about 10 or 15 minutes and then be followed by a bifrontal headache  She states that more recently she has not been getting these warnings but typically just gets the headache  She states the headache sometimes lateralizes to 1 side or the other but is typically in the frontal region  She describes a pressure type pain without significant photophobia, sonophobia or nausea  She states that she was seen by a neurologist in the [place] [address] about 2 or 3 years ago and was prescribed Imitrex 25 mg    She has found that this is effective in stopping her headache usually within a half an hour to 1 hour and she tolerates it well  She had been on riboflavin but is currently not taking this  She states that presently she gets about 2-3 headaches per month  Sometimes her headaches are precipitated by neck pain but is otherwise not noted any significant triggering factors  She states that her daughter has a history of bad migraine headaches  She did have an MRI of her brain done 2 and half years ago in the [place] [address] which demonstrated a 6 mm meningioma in the right frontal region as well as a 3 mm abnormality in the pituitary gland consistent with either a cyst or adenoma  She was seen by neurosurgery subsequent to this and follow-up in 2 years was recommended but never completed  She was also seen by Ophthalmology who found narrow angle glaucoma and this was treated with laser  No other ocular abnormalities were noted  Past Medical History:   Diagnosis Date    COPD (chronic obstructive pulmonary disease) (HCC)     Emphysema lung (HCC)     Fibromyalgia     GERD (gastroesophageal reflux disease)     Hashimoto's thyroiditis     History of breast cancer     Rheumatoid arthritis involving multiple sites ([facility] )        Family History:  Family History   Problem Relation Age of Onset    Skin cancer Mother     Alzheimer's disease Mother     Cirrhosis Father     Coronary artery disease Brother     Cirrhosis Brother     Breast cancer Maternal Grandmother        Past Surgical History:  Past Surgical History:   Procedure Laterality Date    APPENDECTOMY      BREAST LUMPECTOMY Left     CHOLECYSTECTOMY      GASTRIC BYPASS      ROTATOR CUFF REPAIR      SHOULDER SURGERY Right     Muscle tendon transfer        Social History:   reports that she has quit smoking  She has never used smokeless tobacco  She reports current alcohol use of about 14 0 standard drinks of alcohol per week  She reports that she does not use drugs      Allergies:  Ciprofloxacin, Keflex [cephalexin], Penicillins, and Sulfa antibiotics      Current Outpatient Medications:     albuterol (Ventolin HFA) 90 mcg/act inhaler, Inhale 2 puffs every 4 (four) hours as needed for wheezing or shortness of breath, Disp: 8 g, Rfl: 2    Biotin 1000 MCG tablet, Take 1,000 mcg by mouth daily, Disp: , Rfl:     cetirizine (ZyrTEC) 10 MG chewable tablet, Chew 1 tablet (10 mg total) daily, Disp: 90 tablet, Rfl: 1    Cholecalciferol 25 MCG (1000 UT) tablet, Take by mouth, Disp: , Rfl:     clonazePAM (KlonoPIN) 0 5 mg tablet, Take 0 5 mg by mouth, Disp: , Rfl:     Cyanocobalamin (VITAMIN B-12 PO), Take by mouth, Disp: , Rfl:     Diclofenac Epolamine (Flector) 1 3 % PTCH, Flector 1 3 % transdermal 12 hour patch, Disp: , Rfl:     fluticasone (FLONASE) 50 mcg/act nasal spray, fluticasone propionate 50 mcg/act susp, Disp: , Rfl:     folic acid (FOLVITE) 1 mg tablet, Take 1 tablet by mouth daily except on Wednesdays   , Disp: , Rfl:     gabapentin (NEURONTIN) 300 mg capsule, Take 900 mg by mouth daily  , Disp: , Rfl:     ibuprofen (MOTRIN) 800 mg tablet, ibuprofen 800 mg tabs, Disp: , Rfl:     letrozole (FEMARA) 2 5 mg tablet, Take 2 5 mg by mouth daily, Disp: , Rfl:     levothyroxine 88 mcg tablet, , Disp: , Rfl:     lidocaine-prilocaine (EMLA) cream, Apply topically, Disp: , Rfl:     methotrexate 2 5 mg tablet, methotrexate 2 5 mg tabs, Disp: , Rfl:     montelukast (SINGULAIR) 10 mg tablet, Take 10 mg by mouth, Disp: , Rfl:     nystatin-triamcinolone (MYCOLOG-II) cream, nystatin-triamcinolone 100,000 unit/g-0 1 % topical cream, Disp: , Rfl:     omeprazole (PriLOSEC) 40 MG capsule, , Disp: , Rfl:     predniSONE 10 mg tablet, Take 10 mg by mouth daily  , Disp: , Rfl:     venlafaxine (EFFEXOR-XR) 75 mg 24 hr capsule, Take 1 capsule by mouth daily, Disp: , Rfl:     magnesium oxide (MAG-OX) 400 mg tablet, Take by mouth 2 (two) times a day (Patient not taking: Reported on 2/1/2022 ), Disp: , Rfl:    phentermine 37 5 MG capsule, Take 37 5 mg by mouth (Patient not taking: Reported on 12/16/2021 ), Disp: , Rfl:     I have reviewed the past medical, social and family history, current medications, allergies, vitals, review of systems and updated this information as appropriate today     Objective:    Vitals:  Blood pressure 110/68, pulse 104, temperature (!) 96 4 °F (35 8 °C), temperature source Temporal, height 5' 3" (1 6 m), SpO2 97 %, not currently breastfeeding  Physical Exam    Neurological Exam    GENERAL:  Cooperative in no acute distress  Well-developed and well-nourished    HEAD and NECK   Head is atraumatic normocephalic with no lesions or masses  Neck is supple with full range of motion    CARDIOVASCULAR  Carotid Arteries-no carotid bruits  NEUROLOGIC:  Mental Status-the patient is awake alert and oriented without aphasia or apraxia  Cranial Nerves: Visual fields are full to confrontation  Extraocular movements are full without nystagmus  Pupils are 2-1/2 mm and reactive  Face is symmetrical to light touch  Movements of facial expression move symmetrically  Hearing is normal to finger rub bilaterally  Soft palate lifts symmetrically  Shoulder shrug is symmetrical  Tongue is midline without atrophy  Motor: No drift is noted on arm extension  Strength is full in the upper and lower extremities with normal bulk and tone  Sensory: Intact to temperature and vibratory sensation in the upper and lower extremities bilaterally  Cortical function is intact  Coordination: Finger to nose testing is performed accurately with minimal end tremor  Romberg is remarkable for increased sway with eyes closed  Gait reveals a normal base with symmetrical arm swing  Tandem walk is performed with some difficulty, stepping off  Reflexes:  3+ in the biceps, triceps brachioradialis and knee jerk regions with crossed adductors  2+ at the ankles  Toes are +/-upgoing    Matthew sign are positive bilaterally            ROS:    Review of Systems   Constitutional: Negative  Negative for appetite change and fever  HENT: Negative  Negative for hearing loss, tinnitus, trouble swallowing and voice change  Eyes: Negative  Negative for photophobia and pain  Respiratory: Negative  Negative for shortness of breath  Cardiovascular: Negative  Negative for palpitations  Gastrointestinal: Negative  Negative for nausea and vomiting  Endocrine: Negative  Negative for cold intolerance  Genitourinary: Negative  Negative for dysuria, frequency and urgency  Musculoskeletal: Positive for back pain and neck pain  Negative for myalgias  Skin: Negative  Negative for rash  Neurological: Positive for headaches  Negative for dizziness, tremors, seizures, syncope, facial asymmetry, speech difficulty, weakness, light-headedness and numbness  Hematological: Bruises/bleeds easily  Psychiatric/Behavioral: Positive for sleep disturbance  Negative for confusion and hallucinations

## 2022-02-03 ENCOUNTER — OFFICE VISIT (OUTPATIENT)
Dept: FAMILY MEDICINE CLINIC | Facility: CLINIC | Age: 62
End: 2022-02-03
Payer: MEDICARE

## 2022-02-03 VITALS
BODY MASS INDEX: 30.16 KG/M2 | HEART RATE: 91 BPM | SYSTOLIC BLOOD PRESSURE: 122 MMHG | WEIGHT: 170.2 LBS | TEMPERATURE: 98.4 F | OXYGEN SATURATION: 97 % | DIASTOLIC BLOOD PRESSURE: 78 MMHG | HEIGHT: 63 IN

## 2022-02-03 DIAGNOSIS — L72.0 EPIDERMOID CYST: Primary | ICD-10-CM

## 2022-02-03 DIAGNOSIS — J44.9 CHRONIC OBSTRUCTIVE PULMONARY DISEASE, UNSPECIFIED COPD TYPE (HCC): ICD-10-CM

## 2022-02-03 DIAGNOSIS — Z12.11 COLON CANCER SCREENING: ICD-10-CM

## 2022-02-03 PROCEDURE — 99214 OFFICE O/P EST MOD 30 MIN: CPT | Performed by: FAMILY MEDICINE

## 2022-02-03 RX ORDER — LEVOTHYROXINE SODIUM 112 UG/1
112 TABLET ORAL DAILY
COMMUNITY
Start: 2022-01-04

## 2022-02-03 NOTE — PROGRESS NOTES
Assessment/Plan:    No problem-specific Assessment & Plan notes found for this encounter  Diagnoses and all orders for this visit:    Epidermoid cyst  No signs of infection    -     Ambulatory Referral to General Surgery; Future    Colon cancer screening  -     Cologuard    Chronic obstructive pulmonary disease, unspecified COPD type ([facility] )  Stable  Breathing is at baseline, using inhaler PRN  Other orders  -     levothyroxine 112 mcg tablet; Take 112 mcg by mouth in the morning        Subjective:      Patient ID: Cecilia Wu is a 62 y o  female  HPI     Noticed a cyst on her back a few weeks ago  She has not done anything for it  Denies drainage  States that it is changed in size, was a little larger and is now flatter  States that when she lays on her back, she feels discomfort  Denies pain at any other time  Denies fever or chills  Has had multiple cyst removed previously  Would like this done, has seen general surgery previously  Breathing is at baseline  Using albuterol prn  Saw neurology, doing MRIs of brain and c spine  The following portions of the patient's history were reviewed and updated as appropriate: allergies, current medications, past family history, past medical history, past social history, past surgical history and problem list     Review of Systems   Constitutional: Negative for chills, fatigue and fever  HENT: Negative for congestion, ear pain, rhinorrhea and sore throat  Respiratory: Negative for cough and shortness of breath  Cardiovascular: Negative for chest pain and leg swelling  Gastrointestinal: Negative for abdominal pain, constipation, diarrhea, nausea and vomiting  Genitourinary: Negative for dysuria, frequency and urgency  Skin: Negative for rash  Neurological: Negative for headaches           Objective:  /78 (BP Location: Left arm, Patient Position: Sitting, Cuff Size: Adult)   Pulse 91   Temp 98 4 °F (36 9 °C)   Ht 5' 3" (1 6 m)   Wt 77 2 kg (170 lb 3 2 oz)   SpO2 97%   BMI 30 15 kg/m²      Physical Exam  Vitals reviewed  Constitutional:       General: She is not in acute distress  Appearance: Normal appearance  HENT:      Head: Normocephalic and atraumatic  Right Ear: External ear normal       Left Ear: External ear normal       Nose: Nose normal       Mouth/Throat:      Mouth: Mucous membranes are moist    Eyes:      Extraocular Movements: Extraocular movements intact  Conjunctiva/sclera: Conjunctivae normal    Cardiovascular:      Heart sounds: Normal heart sounds  Pulmonary:      Effort: Pulmonary effort is normal       Breath sounds: Normal breath sounds  No wheezing, rhonchi or rales  Skin:     General: Skin is warm  Capillary Refill: Capillary refill takes less than 2 seconds  Findings: No rash  Comments: Mid thoracic epidermoid cyst, no erythema  No warmth  No drainage  Neurological:      Mental Status: She is alert  Mental status is at baseline             Lizzy Torres DO  [facility]  2/3/2022 11:34 AM

## 2022-02-08 ENCOUNTER — CONSULT (OUTPATIENT)
Dept: SURGERY | Facility: CLINIC | Age: 62
End: 2022-02-08
Payer: MEDICARE

## 2022-02-08 VITALS
HEART RATE: 96 BPM | BODY MASS INDEX: 30.12 KG/M2 | SYSTOLIC BLOOD PRESSURE: 110 MMHG | RESPIRATION RATE: 16 BRPM | WEIGHT: 170 LBS | TEMPERATURE: 98 F | HEIGHT: 63 IN | OXYGEN SATURATION: 96 % | DIASTOLIC BLOOD PRESSURE: 68 MMHG

## 2022-02-08 DIAGNOSIS — L72.0 EPIDERMOID CYST: ICD-10-CM

## 2022-02-08 PROCEDURE — 99203 OFFICE O/P NEW LOW 30 MIN: CPT | Performed by: SURGERY

## 2022-02-08 NOTE — H&P (VIEW-ONLY)
Assessment/Plan:     1  Epidermoid cyst  -     Ambulatory Referral to General Surgery  -     Case request operating room: EXCISIONAL DEBRIDEMENT; Standing  -     Case request operating room: EXCISIONAL DEBRIDEMENT        We discussed excisional debridement with sedation and local vs local alone  Risks not limited to bleeding and infection  Consent signed  Subjective:      Patient ID: Barbara Jasso is a 62 y o  female  Triage Notes:    61 yo F presenting with an epidermoid cyst on the back  She noticed it about 3 weeks ago  No blood thinners  No recent fevers or chills but did have some drainage yesterday  The following portions of the patient's history were reviewed and updated as appropriate:   She  has a past medical history of COPD (chronic obstructive pulmonary disease) ([facility] ), Emphysema lung ([facility] ), Fibromyalgia, GERD (gastroesophageal reflux disease), Hashimoto's thyroiditis, History of breast cancer, and Rheumatoid arthritis involving multiple sites ([facility] )  She   Patient Active Problem List    Diagnosis Date Noted    Chronic obstructive pulmonary disease, unspecified COPD type ([place] [address] ) 02/03/2022    Seronegative rheumatoid arthritis ([facility] ) 11/12/2021    Muscle pain, fibromyalgia 02/26/2021    Mixed hyperlipidemia 03/06/2018    IFG (impaired fasting glucose) 01/15/2018     She  has a past surgical history that includes Appendectomy; Cholecystectomy; Rotator cuff repair; Gastric bypass; Breast lumpectomy (Left); and Shoulder surgery (Right)  Her family history includes Alzheimer's disease in her mother; Breast cancer in her maternal grandmother; Cirrhosis in her brother and father; Coronary artery disease in her brother; Skin cancer in her mother  She  reports that she has quit smoking  She has never used smokeless tobacco  She reports current alcohol use of about 14 0 standard drinks of alcohol per week  She reports that she does not use drugs    Current Outpatient Medications on File Prior to Visit   Medication Sig    albuterol (Ventolin HFA) 90 mcg/act inhaler Inhale 2 puffs every 4 (four) hours as needed for wheezing or shortness of breath    Biotin 1000 MCG tablet Take 1,000 mcg by mouth daily    cetirizine (ZyrTEC) 10 MG chewable tablet Chew 1 tablet (10 mg total) daily    Cholecalciferol 25 MCG (1000 UT) tablet Take by mouth    clonazePAM (KlonoPIN) 0 5 mg tablet Take 0 5 mg by mouth      Cyanocobalamin (VITAMIN B-12 PO) Take by mouth    Diclofenac Epolamine (Flector) 1 3 % PTCH Flector 1 3 % transdermal 12 hour patch    fluticasone (FLONASE) 50 mcg/act nasal spray fluticasone propionate 50 mcg/act susp    folic acid (FOLVITE) 1 mg tablet Take 1 tablet by mouth daily except on Wednesdays   gabapentin (NEURONTIN) 300 mg capsule Take 900 mg by mouth daily      ibuprofen (MOTRIN) 800 mg tablet ibuprofen 800 mg tabs    letrozole (FEMARA) 2 5 mg tablet Take 2 5 mg by mouth daily    levothyroxine 112 mcg tablet Take 112 mcg by mouth in the morning    lidocaine-prilocaine (EMLA) cream Apply topically    magnesium oxide (MAG-OX) 400 mg tablet Take by mouth 2 (two) times a day      methotrexate 2 5 mg tablet methotrexate 2 5 mg tabs    montelukast (SINGULAIR) 10 mg tablet Take 10 mg by mouth    nystatin-triamcinolone (MYCOLOG-II) cream nystatin-triamcinolone 100,000 unit/g-0 1 % topical cream    omeprazole (PriLOSEC) 40 MG capsule     predniSONE 10 mg tablet Take 10 mg by mouth daily      SUMAtriptan (Imitrex) 25 mg tablet 1 p o  at headache onset, may repeat 1 after 2 hours p r n     venlafaxine (EFFEXOR-XR) 75 mg 24 hr capsule Take 1 capsule by mouth daily    levothyroxine 88 mcg tablet  (Patient not taking: Reported on 2/3/2022 )    phentermine 37 5 MG capsule Take 37 5 mg by mouth (Patient not taking: Reported on 12/16/2021 )     No current facility-administered medications on file prior to visit       She is allergic to ciprofloxacin, keflex [cephalexin], penicillins, and sulfa antibiotics       Review of Systems   Constitutional: Negative for activity change and appetite change  HENT: Negative for congestion, hearing loss, sore throat and trouble swallowing  Eyes: Negative for discharge and visual disturbance  Respiratory: Negative for cough, chest tightness, shortness of breath and wheezing  Cardiovascular: Negative for chest pain and palpitations  Gastrointestinal: Negative for abdominal pain  Endocrine: Negative for cold intolerance and heat intolerance  Genitourinary: Negative for difficulty urinating  Musculoskeletal: Negative for gait problem  Skin: Positive for wound  Negative for color change and rash  Neurological: Negative for dizziness, speech difficulty and headaches  Psychiatric/Behavioral: Negative for behavioral problems and confusion  The patient is not nervous/anxious  Objective:      /68   Pulse 96   Temp 98 °F (36 7 °C) (Temporal)   Resp 16   Ht 5' 3" (1 6 m)   Wt 77 1 kg (170 lb)   SpO2 96%   BMI 30 11 kg/m²     Below is the patient's most recent value for Albumin, ALT, AST, BUN, Calcium, Chloride, Cholesterol, CO2, Creatinine, GFR, Glucose, HDL, Hematocrit, Hemoglobin, Hemoglobin A1C, LDL, Magnesium, Phosphorus, Platelets, Potassium, PSA, Sodium, Triglycerides, and WBC  Lab Results   Component Value Date    ALT 34 (H) 12/07/2021    AST 20 12/07/2021    BUN 14 12/07/2021    CALCIUM 8 9 07/08/2019     12/07/2021    CO2 23 12/07/2021    CREATININE 0 99 12/07/2021    HDL 45 12/07/2021    HCT 33 1 (L) 12/07/2021    HGB 10 5 (L) 12/07/2021    HGBA1C 5 5 12/07/2021     12/07/2021    K 4 2 12/07/2021    TRIG 150 (H) 12/07/2021    WBC 5 5 12/07/2021     Note: for a comprehensive list of the patient's lab results, access the Results Review activity  Physical Exam  Vitals and nursing note reviewed  Constitutional:       General: She is not in acute distress  Appearance: She is well-developed   She is not diaphoretic  HENT:      Head: Normocephalic and atraumatic  Eyes:      Pupils: Pupils are equal, round, and reactive to light  Cardiovascular:      Rate and Rhythm: Normal rate and regular rhythm  Pulmonary:      Effort: Pulmonary effort is normal  No respiratory distress  Abdominal:      Palpations: Abdomen is soft  Musculoskeletal:         General: Normal range of motion  Cervical back: Normal range of motion and neck supple  Skin:     General: Skin is warm and dry  Comments: On her upper mid back there is a lesion with two punctums without significant drainage with surrounding rubor and induration  Neurological:      Mental Status: She is alert and oriented to person, place, and time  Psychiatric:         Mood and Affect: Mood normal          Behavior: Behavior normal          Thought Content:  Thought content normal          Judgment: Judgment normal              Procedures

## 2022-02-08 NOTE — PROGRESS NOTES
Assessment/Plan:     1  Epidermoid cyst  -     Ambulatory Referral to General Surgery  -     Case request operating room: EXCISIONAL DEBRIDEMENT; Standing  -     Case request operating room: EXCISIONAL DEBRIDEMENT        We discussed excisional debridement with sedation and local vs local alone  Risks not limited to bleeding and infection  Consent signed  Subjective:      Patient ID: Roger Bingham is a 62 y o  female  Triage Notes:    63 yo F presenting with an epidermoid cyst on the back  She noticed it about 3 weeks ago  No blood thinners  No recent fevers or chills but did have some drainage yesterday  The following portions of the patient's history were reviewed and updated as appropriate:   She  has a past medical history of COPD (chronic obstructive pulmonary disease) ([facility] ), Emphysema lung ([facility] ), Fibromyalgia, GERD (gastroesophageal reflux disease), Hashimoto's thyroiditis, History of breast cancer, and Rheumatoid arthritis involving multiple sites ([place] [address] )  She   Patient Active Problem List    Diagnosis Date Noted    Chronic obstructive pulmonary disease, unspecified COPD type ([place] [address] ) 02/03/2022    Seronegative rheumatoid arthritis ([place] [address] ) 11/12/2021    Muscle pain, fibromyalgia 02/26/2021    Mixed hyperlipidemia 03/06/2018    IFG (impaired fasting glucose) 01/15/2018     She  has a past surgical history that includes Appendectomy; Cholecystectomy; Rotator cuff repair; Gastric bypass; Breast lumpectomy (Left); and Shoulder surgery (Right)  Her family history includes Alzheimer's disease in her mother; Breast cancer in her maternal grandmother; Cirrhosis in her brother and father; Coronary artery disease in her brother; Skin cancer in her mother  She  reports that she has quit smoking  She has never used smokeless tobacco  She reports current alcohol use of about 14 0 standard drinks of alcohol per week  She reports that she does not use drugs    Current Outpatient Medications on File Prior to Visit   Medication Sig    albuterol (Ventolin HFA) 90 mcg/act inhaler Inhale 2 puffs every 4 (four) hours as needed for wheezing or shortness of breath    Biotin 1000 MCG tablet Take 1,000 mcg by mouth daily    cetirizine (ZyrTEC) 10 MG chewable tablet Chew 1 tablet (10 mg total) daily    Cholecalciferol 25 MCG (1000 UT) tablet Take by mouth    clonazePAM (KlonoPIN) 0 5 mg tablet Take 0 5 mg by mouth      Cyanocobalamin (VITAMIN B-12 PO) Take by mouth    Diclofenac Epolamine (Flector) 1 3 % PTCH Flector 1 3 % transdermal 12 hour patch    fluticasone (FLONASE) 50 mcg/act nasal spray fluticasone propionate 50 mcg/act susp    folic acid (FOLVITE) 1 mg tablet Take 1 tablet by mouth daily except on Wednesdays   gabapentin (NEURONTIN) 300 mg capsule Take 900 mg by mouth daily      ibuprofen (MOTRIN) 800 mg tablet ibuprofen 800 mg tabs    letrozole (FEMARA) 2 5 mg tablet Take 2 5 mg by mouth daily    levothyroxine 112 mcg tablet Take 112 mcg by mouth in the morning    lidocaine-prilocaine (EMLA) cream Apply topically    magnesium oxide (MAG-OX) 400 mg tablet Take by mouth 2 (two) times a day      methotrexate 2 5 mg tablet methotrexate 2 5 mg tabs    montelukast (SINGULAIR) 10 mg tablet Take 10 mg by mouth    nystatin-triamcinolone (MYCOLOG-II) cream nystatin-triamcinolone 100,000 unit/g-0 1 % topical cream    omeprazole (PriLOSEC) 40 MG capsule     predniSONE 10 mg tablet Take 10 mg by mouth daily      SUMAtriptan (Imitrex) 25 mg tablet 1 p o  at headache onset, may repeat 1 after 2 hours p r n     venlafaxine (EFFEXOR-XR) 75 mg 24 hr capsule Take 1 capsule by mouth daily    levothyroxine 88 mcg tablet  (Patient not taking: Reported on 2/3/2022 )    phentermine 37 5 MG capsule Take 37 5 mg by mouth (Patient not taking: Reported on 12/16/2021 )     No current facility-administered medications on file prior to visit       She is allergic to ciprofloxacin, keflex [cephalexin], penicillins, and sulfa antibiotics       Review of Systems   Constitutional: Negative for activity change and appetite change  HENT: Negative for congestion, hearing loss, sore throat and trouble swallowing  Eyes: Negative for discharge and visual disturbance  Respiratory: Negative for cough, chest tightness, shortness of breath and wheezing  Cardiovascular: Negative for chest pain and palpitations  Gastrointestinal: Negative for abdominal pain  Endocrine: Negative for cold intolerance and heat intolerance  Genitourinary: Negative for difficulty urinating  Musculoskeletal: Negative for gait problem  Skin: Positive for wound  Negative for color change and rash  Neurological: Negative for dizziness, speech difficulty and headaches  Psychiatric/Behavioral: Negative for behavioral problems and confusion  The patient is not nervous/anxious  Objective:      /68   Pulse 96   Temp 98 °F (36 7 °C) (Temporal)   Resp 16   Ht 5' 3" (1 6 m)   Wt 77 1 kg (170 lb)   SpO2 96%   BMI 30 11 kg/m²     Below is the patient's most recent value for Albumin, ALT, AST, BUN, Calcium, Chloride, Cholesterol, CO2, Creatinine, GFR, Glucose, HDL, Hematocrit, Hemoglobin, Hemoglobin A1C, LDL, Magnesium, Phosphorus, Platelets, Potassium, PSA, Sodium, Triglycerides, and WBC  Lab Results   Component Value Date    ALT 34 (H) 12/07/2021    AST 20 12/07/2021    BUN 14 12/07/2021    CALCIUM 8 9 07/08/2019     12/07/2021    CO2 23 12/07/2021    CREATININE 0 99 12/07/2021    HDL 45 12/07/2021    HCT 33 1 (L) 12/07/2021    HGB 10 5 (L) 12/07/2021    HGBA1C 5 5 12/07/2021     12/07/2021    K 4 2 12/07/2021    TRIG 150 (H) 12/07/2021    WBC 5 5 12/07/2021     Note: for a comprehensive list of the patient's lab results, access the Results Review activity  Physical Exam  Vitals and nursing note reviewed  Constitutional:       General: She is not in acute distress  Appearance: She is well-developed   She is not diaphoretic  HENT:      Head: Normocephalic and atraumatic  Eyes:      Pupils: Pupils are equal, round, and reactive to light  Cardiovascular:      Rate and Rhythm: Normal rate and regular rhythm  Pulmonary:      Effort: Pulmonary effort is normal  No respiratory distress  Abdominal:      Palpations: Abdomen is soft  Musculoskeletal:         General: Normal range of motion  Cervical back: Normal range of motion and neck supple  Skin:     General: Skin is warm and dry  Comments: On her upper mid back there is a lesion with two punctums without significant drainage with surrounding rubor and induration  Neurological:      Mental Status: She is alert and oriented to person, place, and time  Psychiatric:         Mood and Affect: Mood normal          Behavior: Behavior normal          Thought Content:  Thought content normal          Judgment: Judgment normal              Procedures

## 2022-02-22 NOTE — PRE-PROCEDURE INSTRUCTIONS
Pre-Surgery Instructions:   Medication Instructions    albuterol (Ventolin HFA) 90 mcg/act inhaler Instructed patient per Anesthesia Guidelines  Use morning of surgery if needed    Biotin 1000 MCG tablet Patient was instructed by Physician and understands   cetirizine (ZyrTEC) 10 MG chewable tablet Patient was instructed by Physician and understands   Cholecalciferol 25 MCG (1000 UT) tablet Patient was instructed by Physician and understands   clonazePAM (KlonoPIN) 0 5 mg tablet Instructed patient per Anesthesia Guidelines  Take morning of surgery with a small sip of water if needed    Cyanocobalamin (VITAMIN B-12 PO) Patient was instructed by Physician and understands   Diclofenac Epolamine (Flector) 1 3 % PTCH Do not apply morning of surgery    fluticasone (FLONASE) 50 mcg/act nasal spray Patient was instructed by Physician and understands   folic acid (FOLVITE) 1 mg tablet Patient was instructed by Physician and understands   gabapentin (NEURONTIN) 300 mg capsule Instructed patient per Anesthesia Guidelines  Take morning of surgery with a small sip of water    ibuprofen (MOTRIN) 800 mg tablet Patient was instructed by Physician and understands   letrozole (FEMARA) 2 5 mg tablet Patient was instructed by Physician and understands   levothyroxine 112 mcg tablet Instructed patient per Anesthesia Guidelines  Take morning of surgery with a small sip of water    magnesium oxide (MAG-OX) 400 mg tablet Patient was instructed by Physician and understands   methotrexate 2 5 mg tablet Patient was instructed by Physician and understands   montelukast (SINGULAIR) 10 mg tablet Patient was instructed by Physician and understands   nystatin-triamcinolone (MYCOLOG-II) cream Do not apply morning of surgery    omeprazole (PriLOSEC) 40 MG capsule Patient was instructed by Physician and understands   predniSONE 10 mg tablet Patient was instructed by Physician and understands      SUMAtriptan (Imitrex) 25 mg tablet Instructed patient per Anesthesia Guidelines  Take if needed with a small sip of water        venlafaxine (EFFEXOR-XR) 75 mg 24 hr capsule Instructed patient per Anesthesia Guidelines  Take morning of surgery with a small sip of water   Covid screening negative as per patient  Fully vaccinated  Reviewed showering and medication instructions  Patient verbalized understanding  Advised to have a light breakfast morning of surgery and ASC will call with scheduled surgical time

## 2022-02-23 ENCOUNTER — HOSPITAL ENCOUNTER (OUTPATIENT)
Facility: HOSPITAL | Age: 62
Setting detail: OUTPATIENT SURGERY
Discharge: HOME | End: 2022-02-23
Attending: SURGERY | Admitting: SURGERY
Payer: MEDICARE

## 2022-02-23 VITALS
BODY MASS INDEX: 30.43 KG/M2 | TEMPERATURE: 97.7 F | SYSTOLIC BLOOD PRESSURE: 101 MMHG | HEIGHT: 63 IN | RESPIRATION RATE: 16 BRPM | OXYGEN SATURATION: 99 % | DIASTOLIC BLOOD PRESSURE: 51 MMHG | WEIGHT: 171.74 LBS | HEART RATE: 79 BPM

## 2022-02-23 DIAGNOSIS — L72.0 EPIDERMOID CYST: ICD-10-CM

## 2022-02-23 PROCEDURE — 88304 TISSUE EXAM BY PATHOLOGIST: CPT | Performed by: PATHOLOGY

## 2022-02-23 PROCEDURE — 11402 EXC TR-EXT B9+MARG 1.1-2 CM: CPT | Performed by: SURGERY

## 2022-02-23 RX ORDER — LIDOCAINE HYDROCHLORIDE 10 MG/ML
INJECTION, SOLUTION EPIDURAL; INFILTRATION; INTRACAUDAL; PERINEURAL AS NEEDED
Status: DISCONTINUED | OUTPATIENT
Start: 2022-02-23 | End: 2022-02-23 | Stop reason: HOSPADM

## 2022-02-23 RX ORDER — MAGNESIUM HYDROXIDE 1200 MG/15ML
LIQUID ORAL AS NEEDED
Status: DISCONTINUED | OUTPATIENT
Start: 2022-02-23 | End: 2022-02-23 | Stop reason: HOSPADM

## 2022-02-23 RX ORDER — BUPIVACAINE HYDROCHLORIDE 2.5 MG/ML
INJECTION, SOLUTION EPIDURAL; INFILTRATION; INTRACAUDAL AS NEEDED
Status: DISCONTINUED | OUTPATIENT
Start: 2022-02-23 | End: 2022-02-23 | Stop reason: HOSPADM

## 2022-02-23 NOTE — OP NOTE
OPERATIVE REPORT  PATIENT NAME: Анна Maher    :  1960  MRN: [id number]  Pt Location: [facility] OR ROOM 03    SURGERY DATE: 2022    Surgeon(s) and Role:     * Eren Alvarez MD - Primary    Preop Diagnosis:  Epidermoid cyst [L72 0]    Post-Op Diagnosis Codes:     * Epidermoid cyst [L72 0]    Procedure(s) (LRB):  EXCISIONAL DEBRIDEMENT OF BACK (N/A)    Specimen(s):  ID Type Source Tests Collected by Time Destination   1 : Back cyst Tissue Back TISSUE EXAM Eren Alvarez MD 2022 [id number]        Estimated Blood Loss:   Minimal    Drains:  * No LDAs found *    Anesthesia Type:   Local    Operative Indications:  Epidermoid cyst [L72 0]      Operative Findings:  1 x 1 5 cm epidermoid cyst of back    Complications:   None    Procedure and Technique:    The patient was seen in preop holding where the location of the lesion was confirmed and marked  The H&P was updated and the procedure reviewed with the patient  The patient was then brought to the OR and placed in lateral position  The patient was sedated  The site was prepped and draped in sterile fashion  A preincision time out was initiated by myself and confirmed the patient, procedure, site/side and any applicable marking as well as preoperative antibiotics  The skin and subcutaneous tissue was then anesthetized with 1% lidocaine and 0 25% marcaine mixed 1:1  A 15 blade was used to make a 2 cm incision  This was carried down through the dermis and the lesion was encountered  An iris scissor was used to disect the mass free from the surrounding dermis and subcutaneous tissue  2 x 0 3 cm of epidermis and dermis was also debrided to remove the cyst capsule in its entirety  The bovey was used to achieve hemostasis  The lesion appeared to be about 1 5 cm in size and consistent with a cyst and was passed off as specimen  The wound was then irrigated and reapproximated using 3-0 vicryll  The wound was then covered with steri strips, guaze and tegaderm  I was present for the entire procedure and A qualified resident physician was not available    Patient Disposition:  APU      SIGNATURE: Paulo Greene MD  DATE: February 23, 2022  TIME: 8:23 AM

## 2022-02-23 NOTE — INTERVAL H&P NOTE
H&P reviewed  After examining the patient I find no changes in the patients condition since the H&P had been written  Marked      Vitals:    02/23/22 0713   BP: 139/70   Pulse: 104   Resp: 20   Temp: (!) 97 1 °F (36 2 °C)   SpO2: 97%

## 2022-02-25 ENCOUNTER — HOSPITAL ENCOUNTER (OUTPATIENT)
Dept: MRI IMAGING | Facility: CLINIC | Age: 62
Discharge: HOME | End: 2022-02-25
Payer: MEDICARE

## 2022-02-25 DIAGNOSIS — G95.9 CERVICAL MYELOPATHY (HCC): ICD-10-CM

## 2022-02-25 DIAGNOSIS — E23.7 ABNORMALITY OF PITUITARY GLAND (HCC): ICD-10-CM

## 2022-02-25 DIAGNOSIS — D32.9 MENINGIOMA (HCC): ICD-10-CM

## 2022-02-25 PROCEDURE — A9585 GADOBUTROL INJECTION: HCPCS | Performed by: PSYCHIATRY & NEUROLOGY

## 2022-02-25 PROCEDURE — 72141 MRI NECK SPINE W/O DYE: CPT

## 2022-02-25 PROCEDURE — G1004 CDSM NDSC: HCPCS

## 2022-02-25 PROCEDURE — 70553 MRI BRAIN STEM W/O & W/DYE: CPT

## 2022-02-25 RX ADMIN — GADOBUTROL 8 ML: 604.72 INJECTION INTRAVENOUS at 12:29

## 2022-03-03 ENCOUNTER — HOSPITAL ENCOUNTER (OUTPATIENT)
Dept: RADIOLOGY | Facility: CLINIC | Age: 62
Discharge: HOME | End: 2022-03-03
Attending: STUDENT IN AN ORGANIZED HEALTH CARE EDUCATION/TRAINING PROGRAM
Payer: MEDICARE

## 2022-03-03 VITALS
HEART RATE: 88 BPM | DIASTOLIC BLOOD PRESSURE: 72 MMHG | SYSTOLIC BLOOD PRESSURE: 116 MMHG | TEMPERATURE: 96.4 F | OXYGEN SATURATION: 98 % | RESPIRATION RATE: 20 BRPM

## 2022-03-03 DIAGNOSIS — M54.16 LUMBAR RADICULOPATHY: ICD-10-CM

## 2022-03-03 PROCEDURE — 64484 NJX AA&/STRD TFRM EPI L/S EA: CPT | Performed by: STUDENT IN AN ORGANIZED HEALTH CARE EDUCATION/TRAINING PROGRAM

## 2022-03-03 PROCEDURE — 64483 NJX AA&/STRD TFRM EPI L/S 1: CPT | Performed by: STUDENT IN AN ORGANIZED HEALTH CARE EDUCATION/TRAINING PROGRAM

## 2022-03-03 RX ORDER — PAPAVERINE HCL 150 MG
20 CAPSULE, EXTENDED RELEASE ORAL ONCE
Status: COMPLETED | OUTPATIENT
Start: 2022-03-03 | End: 2022-03-03

## 2022-03-03 RX ORDER — BUPIVACAINE HCL/PF 2.5 MG/ML
2 VIAL (ML) INJECTION ONCE
Status: COMPLETED | OUTPATIENT
Start: 2022-03-03 | End: 2022-03-03

## 2022-03-03 RX ADMIN — DEXAMETHASONE SODIUM PHOSPHATE 20 MG: 10 INJECTION, SOLUTION INTRAMUSCULAR; INTRAVENOUS at 11:30

## 2022-03-03 RX ADMIN — IOHEXOL 1 ML: 300 INJECTION, SOLUTION INTRAVENOUS at 11:30

## 2022-03-03 RX ADMIN — Medication 2 ML: at 11:30

## 2022-03-03 NOTE — DISCHARGE INSTR - LAB
Epidural Steroid Injection   WHAT YOU NEED TO KNOW:   An epidural steroid injection (JOHN) is a procedure to inject steroid medicine into the epidural space  The epidural space is between your spinal cord and vertebrae  Steroids reduce inflammation and fluid buildup in your spine that may be causing pain  You may be given pain medicine along with the steroids  ACTIVITY  Do not drive or operate machinery today  No strenuous activity today - bending, lifting, etc   You may resume normal activites starting tomorrow - start slowly and as tolerated  You may shower today, but no tub baths or hot tubs  You may have numbness for several hours from the local anesthetic  Please use caution and common sense, especially with weight-bearing activities  CARE OF THE INJECTION SITE  If you have soreness or pain, apply ice to the area today (20 minutes on/20 minutes off)  Starting tomorrow, you may use warm, moist heat or ice if needed  You may have an increase or change in your discomfort for 36-48 hours after your treatment  Apply ice and continue with any pain medication you have been prescribed  Notify the [facility] if you have any of the following: redness, drainage, swelling, headache, stiff neck or fever above 100°F     SPECIAL INSTRUCTIONS  Our office will contact you in approximately 7 days for a progress report  MEDICATIONS  Continue to take all routine medications  Our office may have instructed you to hold some medications  As no general anesthesia was used in today's procedure, you should not experience any side effects related to anesthesia  If you have a problem specifically related to your procedure, please call our office at [phone]  Problems not related to your procedure should be directed to your primary care physician

## 2022-03-03 NOTE — H&P
History of Present Illness:  The patient is a 62 y o  female who presents with complaints of lef tleg pain    Patient Active Problem List   Diagnosis    [facility]onegative [facility] ([facility] )    Mixed hyperlipidemia    Muscle pain, fibromyalgia    IFG (impaired fasting glucose)    Chronic obstructive pulmonary disease, unspecified COPD type ([place] [address] )       Past Medical History:   Diagnosis Date    Cancer ([facility] )     breast    COPD (chronic obstructive pulmonary disease) ([facility] )     Disease of thyroid gland     Emphysema lung (HCC)     Fibromyalgia     GERD (gastroesophageal reflux disease)     Hashimoto's thyroiditis     History of breast cancer     RA (rheumatoid arthritis) ([facility] )     Rheumatoid arthritis involving multiple sites ([place] [address] )        Past Surgical History:   Procedure Laterality Date    APPENDECTOMY      BREAST LUMPECTOMY Left     CHOLECYSTECTOMY      GASTRIC BYPASS      ROTATOR CUFF REPAIR Right     SHOULDER SURGERY Right     Muscle tendon transfer     WOUND DEBRIDEMENT N/A 2/23/2022    Procedure: EXCISIONAL DEBRIDEMENT OF BACK;  Surgeon: Robert Miller MD;  Location: [facility];  Service: General         Current Outpatient Medications:     albuterol (Ventolin HFA) 90 mcg/act inhaler, Inhale 2 puffs every 4 (four) hours as needed for wheezing or shortness of breath, Disp: 8 g, Rfl: 2    Biotin 1000 MCG tablet, Take 1,000 mcg by mouth daily, Disp: , Rfl:     cetirizine (ZyrTEC) 10 MG chewable tablet, Chew 1 tablet (10 mg total) daily, Disp: 90 tablet, Rfl: 1    Cholecalciferol 25 MCG (1000 UT) tablet, Take by mouth, Disp: , Rfl:     clonazePAM (KlonoPIN) 0 5 mg tablet, Take 0 5 mg by mouth daily as needed  , Disp: , Rfl:     Cyanocobalamin (VITAMIN B-12 PO), Take by mouth, Disp: , Rfl:     Diclofenac Epolamine (Flector) 1 3 % PTCH, 2 (two) times a day as needed  , Disp: , Rfl:     fluticasone (FLONASE) 50 mcg/act nasal spray, fluticasone propionate 50 mcg/act susp, Disp: , Rfl:    folic acid (FOLVITE) 1 mg tablet, Take 1 tablet by mouth daily except on Wednesdays   , Disp: , Rfl:     gabapentin (NEURONTIN) 300 mg capsule, Take 900 mg by mouth daily Take 300 in AM and 600 in PM , Disp: , Rfl:     ibuprofen (MOTRIN) 800 mg tablet, ibuprofen 800 mg tabs, Disp: , Rfl:     letrozole (FEMARA) 2 5 mg tablet, Take 2 5 mg by mouth daily, Disp: , Rfl:     levothyroxine 112 mcg tablet, Take 112 mcg by mouth in the morning, Disp: , Rfl:     levothyroxine 88 mcg tablet, , Disp: , Rfl:     lidocaine-prilocaine (EMLA) cream, Apply topically, Disp: , Rfl:     magnesium oxide (MAG-OX) 400 mg tablet, Take by mouth 2 (two) times a day  , Disp: , Rfl:     methotrexate 2 5 mg tablet, Takes 8 pills weekly on wednesday , Disp: , Rfl:     montelukast (SINGULAIR) 10 mg tablet, Take 10 mg by mouth, Disp: , Rfl:     nystatin-triamcinolone (MYCOLOG-II) cream, nystatin-triamcinolone 100,000 unit/g-0 1 % topical cream, Disp: , Rfl:     omeprazole (PriLOSEC) 40 MG capsule, , Disp: , Rfl:     phentermine 37 5 MG capsule, Take 37 5 mg by mouth (Patient not taking: Reported on 12/16/2021 ), Disp: , Rfl:     predniSONE 10 mg tablet, Take 10 mg by mouth daily prn , Disp: , Rfl:     SUMAtriptan (Imitrex) 25 mg tablet, 1 p o  at headache onset, may repeat 1 after 2 hours p r n , Disp: 9 tablet, Rfl: 5    venlafaxine (EFFEXOR-XR) 75 mg 24 hr capsule, Take 1 capsule by mouth daily, Disp: , Rfl:     Allergies   Allergen Reactions    Ciprofloxacin Hives    Keflex [Cephalexin] Hives    Penicillins Hives    Sulfa Antibiotics Hives       Physical Exam:   Vitals:    03/03/22 1113   BP: 108/68   Pulse:    Resp:    Temp:    SpO2:      General: Awake, Alert, Oriented x 3, Mood and affect appropriate  Respiratory: Respirations even and unlabored  Cardiovascular: Peripheral pulses intact; no edema  Musculoskeletal Exam: left leg pain    ASA Score: 3    Patient/Chart Verification  Patient ID Verified: Verbal  ID Band Applied: No  Consents Confirmed: To be obtained in the Pre-Procedure area  H&P( within 30 days) Verified: To be obtained in the Pre-Procedure area  Interval H&P(within 24 hr) Complete (required for Outpatients and Surgery Admit only): To be obtained in the Pre-Procedure area  Allergies Reviewed: Yes  Anticoag/NSAID held?: NA  Currently on antibiotics?: No  Pregnancy denied?: NA    Assessment:   1   Lumbar radiculopathy        Plan: left L4 and L5 TFESI

## 2022-03-07 ENCOUNTER — OFFICE VISIT (OUTPATIENT)
Dept: SURGERY | Facility: CLINIC | Age: 62
End: 2022-03-07

## 2022-03-07 VITALS
HEIGHT: 63 IN | BODY MASS INDEX: 29.95 KG/M2 | HEART RATE: 102 BPM | WEIGHT: 169 LBS | SYSTOLIC BLOOD PRESSURE: 102 MMHG | DIASTOLIC BLOOD PRESSURE: 66 MMHG

## 2022-03-07 DIAGNOSIS — Z09 POSTOP CHECK: Primary | ICD-10-CM

## 2022-03-07 PROCEDURE — 99024 POSTOP FOLLOW-UP VISIT: CPT | Performed by: SURGERY

## 2022-03-07 NOTE — PROGRESS NOTES
Assessment/Plan:    Pathology Results:  Final Diagnosis   A  Skin, Cyst/Tag/Debridement, Back cyst, excision:  - Consistent with reaction to ruptured epidermal cyst with foreign body giant cell reaction to keratinous     debris associated with chronically inflamed granulation tissue, fat necrosis and scar     -- No residual cyst wall identified in examined sections  1  Postop check        Wound well healed  Ok to use vit E oil, extra sunscreen  F/u if other areas develop  Subjective:      Patient ID: Laura Camp is a 62 y o  female  Triage Notes:    Ms Bharath Aguilar is following up after excision of sebaceous cyst of the back  She reports doing well - no fevers, chills, drainage, redness, no ongoing pain or discomfort  The following portions of the patient's history were reviewed and updated as appropriate: allergies, current medications, past family history, past medical history, past social history, past surgical history and problem list     Review of Systems      Objective:      /66   Pulse 102   Ht 5' 3" (1 6 m)   Wt 76 7 kg (169 lb)   BMI 29 94 kg/m²     Below is the patient's most recent value for Albumin, ALT, AST, BUN, Calcium, Chloride, Cholesterol, CO2, Creatinine, GFR, Glucose, HDL, Hematocrit, Hemoglobin, Hemoglobin A1C, LDL, Magnesium, Phosphorus, Platelets, Potassium, PSA, Sodium, Triglycerides, and WBC  Lab Results   Component Value Date    ALT 34 (H) 12/07/2021    AST 20 12/07/2021    BUN 14 12/07/2021    CALCIUM 8 9 07/08/2019     12/07/2021    CO2 23 12/07/2021    CREATININE 0 99 12/07/2021    HDL 45 12/07/2021    HCT 33 1 (L) 12/07/2021    HGB 10 5 (L) 12/07/2021    HGBA1C 5 5 12/07/2021     12/07/2021    K 4 2 12/07/2021    TRIG 150 (H) 12/07/2021    WBC 5 5 12/07/2021     Note: for a comprehensive list of the patient's lab results, access the Results Review activity  Physical Exam  Skin:     Comments: Back wound has well healed   No induration, erythema, drainage  Nontender                Procedures

## 2022-03-15 ENCOUNTER — APPOINTMENT (OUTPATIENT)
Dept: LAB | Facility: CLINIC | Age: 62
End: 2022-03-15
Payer: MEDICARE

## 2022-03-15 DIAGNOSIS — G95.9 CERVICAL MYELOPATHY (HCC): ICD-10-CM

## 2022-03-15 DIAGNOSIS — E78.5 HYPERLIPIDEMIA, UNSPECIFIED HYPERLIPIDEMIA TYPE: ICD-10-CM

## 2022-03-15 DIAGNOSIS — D32.9 MENINGIOMA (HCC): ICD-10-CM

## 2022-03-15 DIAGNOSIS — R73.03 PREDIABETES: ICD-10-CM

## 2022-03-15 DIAGNOSIS — Z76.89 ENCOUNTER TO ESTABLISH CARE WITH NEW DOCTOR: ICD-10-CM

## 2022-03-15 DIAGNOSIS — E06.3 HASHIMOTO'S THYROIDITIS: ICD-10-CM

## 2022-03-15 LAB
ALBUMIN SERPL BCP-MCNC: 3.8 G/DL (ref 3.5–5)
ALP SERPL-CCNC: 103 U/L (ref 46–116)
ALT SERPL W P-5'-P-CCNC: 68 U/L (ref 12–78)
ANION GAP SERPL CALCULATED.3IONS-SCNC: 5 MMOL/L (ref 4–13)
AST SERPL W P-5'-P-CCNC: 28 U/L (ref 5–45)
BASOPHILS # BLD AUTO: 0.14 THOUSANDS/ΜL (ref 0–0.1)
BASOPHILS NFR BLD AUTO: 2 % (ref 0–1)
BILIRUB SERPL-MCNC: 0.56 MG/DL (ref 0.2–1)
BUN SERPL-MCNC: 13 MG/DL (ref 5–25)
CALCIUM SERPL-MCNC: 8.9 MG/DL (ref 8.3–10.1)
CHLORIDE SERPL-SCNC: 107 MMOL/L (ref 100–108)
CHOLEST SERPL-MCNC: 238 MG/DL
CO2 SERPL-SCNC: 28 MMOL/L (ref 21–32)
CREAT SERPL-MCNC: 0.95 MG/DL (ref 0.6–1.3)
EOSINOPHIL # BLD AUTO: 0.34 THOUSAND/ΜL (ref 0–0.61)
EOSINOPHIL NFR BLD AUTO: 4 % (ref 0–6)
ERYTHROCYTE [DISTWIDTH] IN BLOOD BY AUTOMATED COUNT: 15.2 % (ref 11.6–15.1)
EST. AVERAGE GLUCOSE BLD GHB EST-MCNC: 111 MG/DL
GFR SERPL CREATININE-BSD FRML MDRD: 65 ML/MIN/1.73SQ M
GLUCOSE P FAST SERPL-MCNC: 96 MG/DL (ref 65–99)
HBA1C MFR BLD: 5.5 %
HCT VFR BLD AUTO: 35.7 % (ref 34.8–46.1)
HDLC SERPL-MCNC: 44 MG/DL
HGB BLD-MCNC: 11.2 G/DL (ref 11.5–15.4)
IMM GRANULOCYTES # BLD AUTO: 0.01 THOUSAND/UL (ref 0–0.2)
IMM GRANULOCYTES NFR BLD AUTO: 0 % (ref 0–2)
LDLC SERPL CALC-MCNC: 161 MG/DL (ref 0–100)
LYMPHOCYTES # BLD AUTO: 2.21 THOUSANDS/ΜL (ref 0.6–4.47)
LYMPHOCYTES NFR BLD AUTO: 27 % (ref 14–44)
MCH RBC QN AUTO: 22.1 PG (ref 26.8–34.3)
MCHC RBC AUTO-ENTMCNC: 31.4 G/DL (ref 31.4–37.4)
MCV RBC AUTO: 71 FL (ref 82–98)
MONOCYTES # BLD AUTO: 0.74 THOUSAND/ΜL (ref 0.17–1.22)
MONOCYTES NFR BLD AUTO: 9 % (ref 4–12)
NEUTROPHILS # BLD AUTO: 4.7 THOUSANDS/ΜL (ref 1.85–7.62)
NEUTS SEG NFR BLD AUTO: 58 % (ref 43–75)
NONHDLC SERPL-MCNC: 194 MG/DL
NRBC BLD AUTO-RTO: 0 /100 WBCS
PLATELET # BLD AUTO: 299 THOUSANDS/UL (ref 149–390)
PMV BLD AUTO: 10.5 FL (ref 8.9–12.7)
POTASSIUM SERPL-SCNC: 3.9 MMOL/L (ref 3.5–5.3)
PROT SERPL-MCNC: 7.2 G/DL (ref 6.4–8.2)
RBC # BLD AUTO: 5.06 MILLION/UL (ref 3.81–5.12)
SODIUM SERPL-SCNC: 140 MMOL/L (ref 136–145)
TRIGL SERPL-MCNC: 164 MG/DL
TSH SERPL DL<=0.05 MIU/L-ACNC: 2.05 UIU/ML (ref 0.36–3.74)
WBC # BLD AUTO: 8.14 THOUSAND/UL (ref 4.31–10.16)

## 2022-03-15 PROCEDURE — 36415 COLL VENOUS BLD VENIPUNCTURE: CPT

## 2022-03-15 PROCEDURE — 84443 ASSAY THYROID STIM HORMONE: CPT

## 2022-03-15 PROCEDURE — 80061 LIPID PANEL: CPT

## 2022-03-15 PROCEDURE — 83036 HEMOGLOBIN GLYCOSYLATED A1C: CPT

## 2022-03-15 PROCEDURE — 80053 COMPREHEN METABOLIC PANEL: CPT

## 2022-03-15 PROCEDURE — 85025 COMPLETE CBC W/AUTO DIFF WBC: CPT

## 2022-03-22 ENCOUNTER — OFFICE VISIT (OUTPATIENT)
Dept: OBGYN CLINIC | Age: 62
End: 2022-03-22
Payer: MEDICARE

## 2022-03-22 VITALS
WEIGHT: 171 LBS | BODY MASS INDEX: 30.3 KG/M2 | DIASTOLIC BLOOD PRESSURE: 68 MMHG | HEIGHT: 63 IN | SYSTOLIC BLOOD PRESSURE: 116 MMHG

## 2022-03-22 DIAGNOSIS — Z01.419 ENCOUNTER FOR GYNECOLOGICAL EXAMINATION (GENERAL) (ROUTINE) WITHOUT ABNORMAL FINDINGS: Primary | ICD-10-CM

## 2022-03-22 DIAGNOSIS — Z85.3 PERSONAL HISTORY OF MALIGNANT NEOPLASM OF BREAST: ICD-10-CM

## 2022-03-22 DIAGNOSIS — Z12.4 CERVICAL CANCER SCREENING: ICD-10-CM

## 2022-03-22 PROCEDURE — G0101 CA SCREEN;PELVIC/BREAST EXAM: HCPCS | Performed by: STUDENT IN AN ORGANIZED HEALTH CARE EDUCATION/TRAINING PROGRAM

## 2022-03-22 PROCEDURE — G0476 HPV COMBO ASSAY CA SCREEN: HCPCS | Performed by: STUDENT IN AN ORGANIZED HEALTH CARE EDUCATION/TRAINING PROGRAM

## 2022-03-22 PROCEDURE — G0145 SCR C/V CYTO,THINLAYER,RESCR: HCPCS | Performed by: STUDENT IN AN ORGANIZED HEALTH CARE EDUCATION/TRAINING PROGRAM
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
## 2022-03-23 LAB
HPV HR 12 DNA CVX QL NAA+PROBE: NEGATIVE
HPV16 DNA CVX QL NAA+PROBE: NEGATIVE
HPV18 DNA CVX QL NAA+PROBE: NEGATIVE

## 2022-03-24 DIAGNOSIS — J44.9 CHRONIC OBSTRUCTIVE PULMONARY DISEASE, UNSPECIFIED COPD TYPE (HCC): ICD-10-CM

## 2022-03-24 RX ORDER — ALBUTEROL SULFATE 90 UG/1
2 AEROSOL, METERED RESPIRATORY (INHALATION) EVERY 4 HOURS PRN
Qty: 8 G | Refills: 2 | Status: SHIPPED | OUTPATIENT
Start: 2022-03-24 | End: 2022-04-01 | Stop reason: SDUPTHER

## 2022-03-24 NOTE — TELEPHONE ENCOUNTER
Patient called and states the pharmacy was suppose to send refill request to us, they have not  Needs albuterol inhaler refilled

## 2022-03-28 LAB
LAB AP GYN PRIMARY INTERPRETATION: NORMAL
Lab: NORMAL

## 2022-04-01 ENCOUNTER — TELEMEDICINE (OUTPATIENT)
Dept: FAMILY MEDICINE CLINIC | Facility: CLINIC | Age: 62
End: 2022-04-01
Payer: MEDICARE

## 2022-04-01 VITALS — WEIGHT: 171 LBS | BODY MASS INDEX: 30.3 KG/M2 | HEIGHT: 63 IN

## 2022-04-01 DIAGNOSIS — M79.10 MYALGIA: ICD-10-CM

## 2022-04-01 DIAGNOSIS — R09.82 PND (POST-NASAL DRIP): ICD-10-CM

## 2022-04-01 DIAGNOSIS — Z91.09 ENVIRONMENTAL ALLERGIES: ICD-10-CM

## 2022-04-01 DIAGNOSIS — B34.9 VIRAL INFECTION, UNSPECIFIED: Primary | ICD-10-CM

## 2022-04-01 DIAGNOSIS — J44.9 CHRONIC OBSTRUCTIVE PULMONARY DISEASE, UNSPECIFIED COPD TYPE (HCC): ICD-10-CM

## 2022-04-01 DIAGNOSIS — R09.81 NASAL CONGESTION: ICD-10-CM

## 2022-04-01 DIAGNOSIS — R05.1 ACUTE COUGH: ICD-10-CM

## 2022-04-01 PROCEDURE — 87636 SARSCOV2 & INF A&B AMP PRB: CPT | Performed by: FAMILY MEDICINE

## 2022-04-01 PROCEDURE — 99214 OFFICE O/P EST MOD 30 MIN: CPT | Performed by: FAMILY MEDICINE

## 2022-04-01 RX ORDER — ALBUTEROL SULFATE 90 UG/1
2 AEROSOL, METERED RESPIRATORY (INHALATION) EVERY 4 HOURS PRN
Qty: 8 G | Refills: 2 | Status: SHIPPED | OUTPATIENT
Start: 2022-04-01

## 2022-04-01 RX ORDER — CETIRIZINE HYDROCHLORIDE 10 MG/1
10 TABLET ORAL DAILY
Qty: 90 TABLET | Refills: 1 | Status: SHIPPED | OUTPATIENT
Start: 2022-04-01 | End: 2022-07-21

## 2022-04-01 NOTE — PROGRESS NOTES
COVID-19 Outpatient Progress Note    Assessment/Plan:    Problem List Items Addressed This Visit        Respiratory    Chronic obstructive pulmonary disease, unspecified COPD type ([facility] )    Relevant Medications    cetirizine (ZyrTEC) 10 mg tablet    albuterol (Ventolin HFA) 90 mcg/act inhaler      Other Visit Diagnoses     Viral infection, unspecified    -  Primary    Relevant Orders    Covid/Flu- Office Collect    PND (post-nasal drip)        Environmental allergies        Relevant Medications    cetirizine (ZyrTEC) 10 mg tablet    Nasal congestion        Relevant Orders    Covid/Flu- Office Collect    Myalgia        Relevant Orders    Covid/Flu- Office Collect    Acute cough         Relevant Orders    Covid/Flu- Office Collect         Disposition:     Recommended patient to come to the office to test for COVID-19/Influenza  Will rule out COVID/flu in the setting of her immunocompromised state and history of COPD  Will treat seasonal allergies with Zyrtec in addition to the Flonase and the montelukast      Albuterol refill provided  I have spent 12 minutes directly with the patient  Encounter provider Ann Burnette DO    Provider located at [facility] [address]   [address] [place] [phone]    Recent Visits  No visits were found meeting these conditions  Showing recent visits within past 7 days and meeting all other requirements  Today's Visits  Date Type Provider Dept   04/01/22 Telemedicine Ann Burnette DO [facility] [address] [facility]   Showing today's visits and meeting all other requirements  Future Appointments  No visits were found meeting these conditions  Showing future appointments within next 150 days and meeting all other requirements     This virtual check-in was done via [facility] and patient was informed that this is a secure, HIPAA-compliant platform   She agrees to proceed  Patient agrees to participate in a virtual check in via telephone or video visit instead of presenting to the office to address urgent/immediate medical needs  Patient is aware this is a billable service  After connecting through [facility], the patient was identified by name and date of birth  Jada Diaz was informed that this was a telemedicine visit and that the exam was being conducted confidentially over secure lines  My office door was closed  No one else was in the room  Jada Diaz acknowledged consent and understanding of privacy and security of the telemedicine visit  I informed the patient that I have reviewed her record in Epic and presented the opportunity for her to ask any questions regarding the visit today  The patient agreed to participate  Verification of patient location:  Patient is located in the following state in which I hold an active license: [place]    Subjective:   Jada Diaz is a 62 y o  female who is concerned about COVID-19  Patient's symptoms include fatigue, nasal congestion, sore throat, cough, myalgias and headache  Patient denies fever, chills, malaise, rhinorrhea, anosmia, loss of taste, shortness of breath, chest tightness, abdominal pain, nausea, vomiting and diarrhea       - Date of symptom onset: 3/29/2022      COVID-19 vaccination status: Fully vaccinated with booster    Exposure:   Contact with a person who is under investigation (PUI) for or who is positive for COVID-19 within the last 14 days?: Yes    Hospitalized recently for fever and/or lower respiratory symptoms?: No      Currently a healthcare worker that is involved in direct patient care?: No      Works in a special setting where the risk of COVID-19 transmission may be high? (this may include long-term care, correctional and senior care facilities; homeless shelters; assisted-living facilities and group homes ): No      Resident in a special setting where the risk of COVID-19 transmission may be high? (this may include long-term care, correctional and long term facilities; homeless shelters; assisted-living facilities and group homes ): No      No results found for: GILBERTO Ayala, CORONAVIRUSR, SARSCOVAMANDO, [address]  Past Medical History:   Diagnosis Date    Cancer ([facility] )     breast    COPD (chronic obstructive pulmonary disease) ([place] [address] )     Disease of thyroid gland     Emphysema lung ([place] [address] )     Fibromyalgia     GERD (gastroesophageal reflux disease)     Hashimoto's thyroiditis     History of breast cancer     RA (rheumatoid arthritis) ([place] [address] )     Rheumatoid arthritis involving multiple sites ([place] [address] )      Past Surgical History:   Procedure Laterality Date    APPENDECTOMY      BREAST LUMPECTOMY Left     CHOLECYSTECTOMY      GASTRIC BYPASS      ROTATOR CUFF REPAIR Right     SHOULDER SURGERY Right     Muscle tendon transfer     WOUND DEBRIDEMENT N/A 2/23/2022    Procedure: EXCISIONAL DEBRIDEMENT OF BACK;  Surgeon: Radha Lemus MD;  Location: [facility];  Service: General     Current Outpatient Medications   Medication Sig Dispense Refill    albuterol (Ventolin HFA) 90 mcg/act inhaler Inhale 2 puffs every 4 (four) hours as needed for wheezing or shortness of breath 8 g 2    Biotin 1000 MCG tablet Take 1,000 mcg by mouth daily      Cholecalciferol 25 MCG (1000 UT) tablet Take by mouth      clonazePAM (KlonoPIN) 0 5 mg tablet Take 0 5 mg by mouth daily as needed        Cyanocobalamin (VITAMIN B-12 PO) Take by mouth      Diclofenac Epolamine (Flector) 1 3 % PTCH 2 (two) times a day as needed        fluticasone (FLONASE) 50 mcg/act nasal spray fluticasone propionate 50 mcg/act susp      folic acid (FOLVITE) 1 mg tablet Take 1 tablet by mouth daily except on Wednesdays         gabapentin (NEURONTIN) 300 mg capsule Take 900 mg by mouth daily Take 300 in AM and 600 in PM       ibuprofen (MOTRIN) 800 mg tablet ibuprofen 800 mg tabs      letrozole [facility]) 2 5 mg tablet Take 2 5 mg by mouth daily      levothyroxine 112 mcg tablet Take 112 mcg by mouth in the morning      lidocaine-prilocaine (EMLA) cream Apply topically      methotrexate 2 5 mg tablet Takes 8 pills weekly on wednesday       montelukast (SINGULAIR) 10 mg tablet Take 10 mg by mouth      nystatin-triamcinolone (MYCOLOG-II) cream nystatin-triamcinolone 100,000 unit/g-0 1 % topical cream      omeprazole (PriLOSEC) 40 MG capsule       predniSONE 10 mg tablet Take 10 mg by mouth daily prn       SUMAtriptan (Imitrex) 25 mg tablet 1 p o  at headache onset, may repeat 1 after 2 hours p r n  9 tablet 5    venlafaxine (EFFEXOR-XR) 75 mg 24 hr capsule Take 1 capsule by mouth daily      cetirizine (ZyrTEC) 10 mg tablet Take 1 tablet (10 mg total) by mouth daily 90 tablet 1    levothyroxine 88 mcg tablet  (Patient not taking: Reported on 2/3/2022 )      magnesium oxide (MAG-OX) 400 mg tablet Take by mouth 2 (two) times a day   (Patient not taking: Reported on 3/7/2022 )      phentermine 37 5 MG capsule Take 37 5 mg by mouth (Patient not taking: Reported on 12/16/2021 )       No current facility-administered medications for this visit  Allergies   Allergen Reactions    Ciprofloxacin Hives    Keflex [Cephalexin] Hives    Penicillins Hives    Sulfa Antibiotics Hives     Review of Systems   Constitutional: Positive for fatigue  Negative for chills and fever  HENT: Positive for congestion, ear pain, postnasal drip and sore throat  Negative for rhinorrhea, sinus pressure and sinus pain  Respiratory: Positive for cough  Negative for chest tightness and shortness of breath  Gastrointestinal: Negative for abdominal pain, diarrhea, nausea and vomiting  Musculoskeletal: Positive for myalgias  Neurological: Positive for headaches  Objective:    Vitals:    04/01/22 0826   Weight: 77 6 kg (171 lb)   Height: 5' 3" (1 6 m)     Physical Exam  Vitals reviewed     Constitutional: General: She is not in acute distress  Appearance: Normal appearance  HENT:      Head: Normocephalic and atraumatic  Right Ear: External ear normal       Left Ear: External ear normal       Nose: Congestion present  Mouth/Throat:      Mouth: Mucous membranes are moist    Eyes:      Extraocular Movements: Extraocular movements intact  Conjunctiva/sclera: Conjunctivae normal    Pulmonary:      Effort: Pulmonary effort is normal  No respiratory distress  Neurological:      Mental Status: She is alert  Mental status is at baseline  VIRTUAL VISIT DISCLAIMER    [place] verbally agrees to participate in [employer]  Pt is aware that [employer] could be limited without vital signs or the ability to perform a full hands-on physical Irena Reams understands she or the provider may request at any time to terminate the video visit and request the patient to seek care or treatment in person      Eddie Tilley DO  [facility]  4/1/2022 10:45 AM

## 2022-04-02 LAB
FLUAV RNA RESP QL NAA+PROBE: NEGATIVE
FLUBV RNA RESP QL NAA+PROBE: NEGATIVE
SARS-COV-2 RNA RESP QL NAA+PROBE: NEGATIVE

## 2022-04-04 DIAGNOSIS — K21.9 GASTROESOPHAGEAL REFLUX DISEASE, UNSPECIFIED WHETHER ESOPHAGITIS PRESENT: ICD-10-CM

## 2022-04-04 RX ORDER — OMEPRAZOLE 40 MG/1
40 CAPSULE, DELAYED RELEASE ORAL DAILY
Qty: 90 CAPSULE | Refills: 2 | Status: SHIPPED | OUTPATIENT
Start: 2022-04-04

## 2022-05-16 ENCOUNTER — OFFICE VISIT (OUTPATIENT)
Dept: NEUROLOGY | Facility: CLINIC | Age: 62
End: 2022-05-16
Payer: MEDICARE

## 2022-05-16 VITALS
RESPIRATION RATE: 16 BRPM | SYSTOLIC BLOOD PRESSURE: 106 MMHG | HEART RATE: 96 BPM | DIASTOLIC BLOOD PRESSURE: 68 MMHG | WEIGHT: 168.8 LBS | BODY MASS INDEX: 29.91 KG/M2 | HEIGHT: 63 IN

## 2022-05-16 DIAGNOSIS — G43.109 MIGRAINE WITH AURA AND WITHOUT STATUS MIGRAINOSUS, NOT INTRACTABLE: Primary | ICD-10-CM

## 2022-05-16 DIAGNOSIS — D32.9 MENINGIOMA (HCC): ICD-10-CM

## 2022-05-16 DIAGNOSIS — E23.7 ABNORMALITY OF PITUITARY GLAND (HCC): ICD-10-CM

## 2022-05-16 PROCEDURE — 99213 OFFICE O/P EST LOW 20 MIN: CPT | Performed by: PSYCHIATRY & NEUROLOGY

## 2022-05-16 NOTE — PROGRESS NOTES
Bernice Barbour is a 62 y o  female presents today with complaints of headache with history of meningioma and pituitary cyst    Assessment:  1  Migraine with aura and without status migrainosus, not intractable    2  Meningioma ([facility] [address] )    3  Abnormality of pituitary gland (HCC)        Plan:  Continue Imitrex as needed   Follow-up 6 months    Discussion:  Sara Ackerman reports migraine headaches are occurring less often and continues to find 25 milligrams Imitrex effective in stopping her headache  MRI of the cervical spine demonstrated some spondylitic changes without significant canal stenosis and MRI of the brain continues to demonstrate a 6 millimeter meningioma without mass effect to the right posterior frontal convexity which is stable and a 2-3 millimeter 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  Anticipate repeating brain imaging in a couple of years  I will see her back in follow-up in 6 months      Subjective:    HPI  Sara Ackerman returns in follow-up today  She reports that since here last she has found that she is having sure migraine headaches and she was before  She continues to find Imitrex effective in stopping it    Imaging of the brain demonstrated no significant change in the small right frontal convexity meningioma and no change in the 2-3 millimeter 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     breast    COPD (chronic obstructive pulmonary disease) (HCC)     Disease of thyroid gland     Emphysema lung (HCC)     Fibromyalgia     GERD (gastroesophageal reflux disease)     Hashimoto's thyroiditis     History of breast cancer     RA (rheumatoid arthritis) ([facility] [address] )     [facility]oid [facility] involving multiple sites ([facility]        Family History:  Family History   Problem Relation Age of Onset    Skin cancer Mother     Alzheimer's disease Mother     Cirrhosis Father     Coronary artery disease Brother     Cirrhosis Brother     Breast cancer Maternal Grandmother        Past Surgical History:  Past Surgical History:   Procedure Laterality Date    APPENDECTOMY      BREAST LUMPECTOMY Left     CHOLECYSTECTOMY      GASTRIC BYPASS      ROTATOR CUFF REPAIR Right     SHOULDER SURGERY Right     Muscle tendon transfer     WOUND DEBRIDEMENT N/A 2/23/2022    Procedure: EXCISIONAL DEBRIDEMENT OF BACK;  Surgeon: Jodi Trejo MD;  Location: [facility] OR;  Service: General       Social History:   reports that she has quit smoking  She has never used smokeless tobacco  She reports current alcohol use of about 14 0 standard drinks of alcohol per week  She reports that she does not use drugs  Allergies:  Ciprofloxacin, Keflex [cephalexin], Penicillins, and Sulfa antibiotics      Current Outpatient Medications:     albuterol (Ventolin HFA) 90 mcg/act inhaler, Inhale 2 puffs every 4 (four) hours as needed for wheezing or shortness of breath, Disp: 8 g, Rfl: 2    Biotin 1000 MCG tablet, Take 1,000 mcg by mouth daily, Disp: , Rfl:     cetirizine (ZyrTEC) 10 mg tablet, Take 1 tablet (10 mg total) by mouth daily, Disp: 90 tablet, Rfl: 1    Cholecalciferol 25 MCG (1000 UT) tablet, Take by mouth, Disp: , Rfl:     clonazePAM (KlonoPIN) 0 5 mg tablet, Take 0 5 mg by mouth daily as needed  , Disp: , Rfl:     Cyanocobalamin (VITAMIN B-12 PO), Take by mouth, Disp: , Rfl:     Diclofenac Epolamine 1 3 % PTCH, 2 (two) times a day as needed  , Disp: , Rfl:     fluticasone (FLONASE) 50 mcg/act nasal spray, fluticasone propionate 50 mcg/act susp, Disp: , Rfl:     folic acid (FOLVITE) 1 mg tablet, Take 1 tablet by mouth daily except on Wednesdays   , Disp: , Rfl:     gabapentin (NEURONTIN) 300 mg capsule, Take 900 mg by mouth daily Take 300 in AM and 600 in PM , Disp: , Rfl:     ibuprofen (MOTRIN) 800 mg tablet, ibuprofen 800 mg tabs, Disp: , Rfl:     letrozole Pending sale to [facility]) 2 5 mg tablet, Take 2 5 mg by mouth daily, Disp: , Rfl:     levothyroxine 112 mcg tablet, Take 112 mcg by mouth in the morning, Disp: , Rfl:     levothyroxine 88 mcg tablet, , Disp: , Rfl:     lidocaine-prilocaine (EMLA) cream, Apply topically, Disp: , Rfl:     magnesium oxide (MAG-OX) 400 mg tablet, Take by mouth 2 (two) times a day, Disp: , Rfl:     methotrexate 2 5 mg tablet, Takes 8 pills weekly on wednesday , Disp: , Rfl:     montelukast (SINGULAIR) 10 mg tablet, Take 10 mg by mouth, Disp: , Rfl:     nystatin-triamcinolone (MYCOLOG-II) cream, nystatin-triamcinolone 100,000 unit/g-0 1 % topical cream, Disp: , Rfl:     omeprazole (PriLOSEC) 40 MG capsule, Take 1 capsule (40 mg total) by mouth daily, Disp: 90 capsule, Rfl: 2    phentermine 37 5 MG capsule, Take 37 5 mg by mouth, Disp: , Rfl:     predniSONE 10 mg tablet, Take 10 mg by mouth daily prn , Disp: , Rfl:     SUMAtriptan (Imitrex) 25 mg tablet, 1 p o  at headache onset, may repeat 1 after 2 hours p r n , Disp: 9 tablet, Rfl: 5    venlafaxine (EFFEXOR-XR) 75 mg 24 hr capsule, Take 1 capsule by mouth daily, Disp: , Rfl:     I have reviewed the past medical, social and family history, current medications, allergies, vitals, review of systems and updated this information as appropriate today     Objective:    Vitals:  Blood pressure 106/68, pulse 96, resp  rate 16, height 5' 3" (1 6 m), weight 76 6 kg (168 lb 12 8 oz), not currently breastfeeding  Physical Exam    Neurological Exam  GENERAL:  Well-developed well-nourished woman in no acute distress  HEENT/NECK: Head is atraumatic normocephalic, neck is supple  NEUROLOGIC:  Mental Status: Awake and alert without aphasia  Cranial Nerves: Extraocular movements are full  Face is symmetrical  Coordination:  Gait is stable          ROS:    Review of Systems   Constitutional: Negative  Negative for appetite change and fever  HENT: Negative    Negative for hearing loss, tinnitus, trouble swallowing and voice change  Eyes: Negative  Negative for photophobia and pain  Respiratory: Negative  Negative for shortness of breath  Cardiovascular: Negative  Negative for palpitations  Gastrointestinal: Negative  Negative for nausea and vomiting  Endocrine: Negative  Negative for cold intolerance  Genitourinary: Negative  Negative for dysuria, frequency and urgency  Musculoskeletal: Positive for back pain and neck pain  Negative for myalgias  Skin: Negative  Negative for rash  Neurological: Positive for numbness and headaches  Negative for dizziness, tremors, seizures, syncope, facial asymmetry, speech difficulty, weakness and light-headedness  Patient states migraines have improved to twice a month since last visit  Patient states numbness in both hands   Hematological: Negative  Does not bruise/bleed easily  Psychiatric/Behavioral: Positive for sleep disturbance  Negative for confusion and hallucinations

## 2022-06-10 ENCOUNTER — RA CDI HCC (OUTPATIENT)
Dept: OTHER | Facility: HOSPITAL | Age: 62
End: 2022-06-10

## 2022-06-10 NOTE — PROGRESS NOTES
[facility] [address]  coding opportunities       Chart reviewed, no opportunity found: CHART REVIEWED, NO OPPORTUNITY FOUND        Patients Insurance     Medicare Insurance: Medicare

## 2022-06-16 NOTE — H&P (VIEW-ONLY)
Pain Medicine Follow-Up Note    Assessment:  1  Lumbar radiculopathy    2  Chronic pain syndrome    3  Muscle pain, fibromyalgia        Plan:  Orders Placed This Encounter   Procedures    FL spine and pain procedure     Standing Status:   Future     Standing Expiration Date:   6/20/2026     Order Specific Question:   Reason for Exam:     Answer:   left L4 and L5 TFESI- depo     Order Specific Question:   Anticoagulant hold needed? Answer:   No     Order Specific Question:   Is the patient pregnant? Answer:   Unknown       No orders of the defined types were placed in this encounter  My impressions and treatment recommendations were discussed in detail with the patient who verbalized understanding and had no further questions  This is a 61-year-old female who returns to the office following left L4 and L5 transforaminal epidural steroid injection with at least 50% relief for 2 weeks  She again has extraforaminal disc protrusion at the L5-S1 level  Given initial relief with 1st procedure, discussed repeating the procedure, however will use Depo-Medrol as this may provide more long-term relief  In the future may consider interlaminar approach  Discussed this sometimes need multiple injections to provide maximum level of relief  Patient demonstrates understanding  If no significant improvement with the injection, then would consider EMG of the left lower extremity  [address] Prescription Drug Monitoring Program report was reviewed and was appropriate       Complete risks and benefits including bleeding, infection, tissue reaction, nerve injury and allergic reaction were discussed  The approach was demonstrated using models and literature was provided  Verbal and written consent was obtained  Discharge instructions were provided  I personally saw and examined the patient and I agree with the above discussed plan of care  History of Present Illness:    Tamir Torres is a 62 y o  female who presents to [facility] for interval re-evaluation of the above stated pain complaints  The patient has a past medical and chronic pain history as outlined in the assessment section  She was last seen on 03/03/2022 for left L4 and L5 TFESI with notable relief for 2 weeks  Returns today with 7/10 pain which is same since last visit  She reports numbness in the dorsum of the left foot  Also reports dull/aching pain and sharp pain  Also reports low back pain as well       She continues to see rheumatologist in regards to her rheumatoid arthritis  She is currently on prednisone 10 mg daily p r n  Other than as stated above, the patient denies any interval changes in medications, medical condition, mental condition, symptoms, or allergies since the last office visit           Review of Systems:    Review of Systems   Musculoskeletal:        LEFT foot pain         Patient Active Problem List   Diagnosis    Seronegative rheumatoid arthritis ([place] [address] )    Mixed hyperlipidemia    Muscle pain, fibromyalgia    IFG (impaired fasting glucose)    Chronic obstructive pulmonary disease, unspecified COPD type ([place] [address] )    H/O gastric sleeve    History of breast cancer in female    Hypothyroidism due to Hashimoto's thyroiditis    Meningioma [facility])       Past Medical History:   Diagnosis Date    Cancer ([place] [address] )     breast    COPD (chronic obstructive pulmonary disease) (HCC)     Disease of thyroid gland     Emphysema lung (HCC)     Fibromyalgia     GERD (gastroesophageal reflux disease)     Hashimoto's thyroiditis     History of breast cancer     RA (rheumatoid arthritis) ([place] [address] )     Rheumatoid arthritis involving multiple sites [facility])        Past Surgical History:   Procedure Laterality Date    APPENDECTOMY      BREAST LUMPECTOMY Left     CHOLECYSTECTOMY      CYST REMOVAL  04/01/2022    GASTRIC BYPASS      ROTATOR CUFF REPAIR Right     SHOULDER SURGERY Right     Muscle tendon transfer    [address] [place] [address] N/A 02/23/2022    Procedure: EXCISIONAL DEBRIDEMENT OF BACK;  Surgeon: Carlitos Victoria MD;  Location: [facility];  Service: General       Family History   Problem Relation Age of Onset    Skin cancer Mother     Alzheimer's disease Mother     Cirrhosis Father     Coronary artery disease Brother     Cirrhosis Brother     Breast cancer Maternal Grandmother        Social History     Occupational History    Not on file   Tobacco Use    Smoking status: Former Smoker    Smokeless tobacco: Never Used   Vaping Use    Vaping Use: Never used   Substance and Sexual Activity    Alcohol use: Yes     Alcohol/week: 14 0 standard drinks     Types: 14 Glasses of wine per week     Comment: a week    Drug use: Never    Sexual activity: Not Currently     Partners: Male         Current Outpatient Medications:     albuterol (Ventolin HFA) 90 mcg/act inhaler, Inhale 2 puffs every 4 (four) hours as needed for wheezing or shortness of breath (Patient taking differently: Inhale 2 puffs every 4 (four) hours as needed for wheezing or shortness of breath PRN), Disp: 8 g, Rfl: 2    Biotin 1000 MCG tablet, Take 1,000 mcg by mouth daily, Disp: , Rfl:     cetirizine (ZyrTEC) 10 mg tablet, Take 1 tablet (10 mg total) by mouth daily, Disp: 90 tablet, Rfl: 1    Cholecalciferol 25 MCG (1000 UT) tablet, Take by mouth, Disp: , Rfl:     clonazePAM (KlonoPIN) 0 5 mg tablet, Take 0 5 mg by mouth daily as needed PRN, Disp: , Rfl:     Cyanocobalamin (VITAMIN B-12 PO), Take by mouth, Disp: , Rfl:     Diclofenac Epolamine 1 3 % PTCH, 2 (two) times a day as needed  , Disp: , Rfl:     fluticasone (FLONASE) 50 mcg/act nasal spray, fluticasone propionate 50 mcg/act susp, Disp: , Rfl:     folic acid (FOLVITE) 1 mg tablet, Take 1 tablet by mouth daily except on Wednesdays   , Disp: , Rfl:     gabapentin (NEURONTIN) 300 mg capsule, Take 900 mg by mouth daily Take 300 in AM and 600 in PM , Disp: , Rfl:    ibuprofen (MOTRIN) 800 mg tablet, Take 800 mg by mouth as needed PRN, Disp: , Rfl:     letrozole (FEMARA) 2 5 mg tablet, Take 2 5 mg by mouth daily, Disp: , Rfl:     levothyroxine 112 mcg tablet, Take 112 mcg by mouth in the morning, Disp: , Rfl:     lidocaine-prilocaine (EMLA) cream, Apply topically, Disp: , Rfl:     magnesium oxide (MAG-OX) 400 mg tablet, Take by mouth 2 (two) times a day, Disp: , Rfl:     methotrexate 2 5 mg tablet, Takes 8 pills weekly on wednesday , Disp: , Rfl:     montelukast (SINGULAIR) 10 mg tablet, Take 10 mg by mouth, Disp: , Rfl:     nystatin-triamcinolone (MYCOLOG-II) cream, PRN, Disp: , Rfl:     omeprazole (PriLOSEC) 40 MG capsule, Take 1 capsule (40 mg total) by mouth daily, Disp: 90 capsule, Rfl: 2    predniSONE 10 mg tablet, Take 1 tablet (10 mg total) by mouth daily as needed (inflammation) prn, Disp: 30 tablet, Rfl: 0    SUMAtriptan (Imitrex) 25 mg tablet, 1 p o  at headache onset, may repeat 1 after 2 hours p r n , Disp: 9 tablet, Rfl: 5    venlafaxine (EFFEXOR-XR) 75 mg 24 hr capsule, Take 1 capsule by mouth daily, Disp: , Rfl:     Allergies   Allergen Reactions    Ciprofloxacin Hives    Keflex [Cephalexin] Hives    Penicillins Hives    Sulfa Antibiotics Hives       Physical Exam:    /71 (BP Location: Right arm, Patient Position: Sitting, Cuff Size: Standard)   Pulse 84   Ht 5' 3" (1 6 m)   Wt 79 4 kg (175 lb)   BMI 31 00 kg/m²     Constitutional:normal, well developed, well nourished, alert, in no distress and non-toxic and no overt pain behavior    Eyes:anicteric  HEENT:grossly intact  Neck:supple, symmetric, trachea midline and no masses   Pulmonary:even and unlabored  Cardiovascular:No edema or pitting edema present  Skin:Normal without rashes or lesions and well hydrated  Psychiatric:Mood and affect appropriate  Neurologic:Cranial Nerves II-XII grossly intact  Musculoskeletal:normal      Imaging  FL spine and pain procedure    (Results Pending) Orders Placed This Encounter   Procedures    FL spine and pain procedure

## 2022-06-16 NOTE — PROGRESS NOTES
Pain Medicine Follow-Up Note    Assessment:  1  Lumbar radiculopathy    2  Chronic pain syndrome    3  Muscle pain, fibromyalgia        Plan:  Orders Placed This Encounter   Procedures    FL spine and pain procedure     Standing Status:   Future     Standing Expiration Date:   6/20/2026     Order Specific Question:   Reason for Exam:     Answer:   left L4 and L5 TFESI- depo     Order Specific Question:   Anticoagulant hold needed? Answer:   No     Order Specific Question:   Is the patient pregnant? Answer:   Unknown       No orders of the defined types were placed in this encounter  My impressions and treatment recommendations were discussed in detail with the patient who verbalized understanding and had no further questions  This is a 46-year-old female who returns to the office following left L4 and L5 transforaminal epidural steroid injection with at least 50% relief for 2 weeks  She again has extraforaminal disc protrusion at the L5-S1 level  Given initial relief with 1st procedure, discussed repeating the procedure, however will use Depo-Medrol as this may provide more long-term relief  In the future may consider interlaminar approach  Discussed this sometimes need multiple injections to provide maximum level of relief  Patient demonstrates understanding  If no significant improvement with the injection, then would consider EMG of the left lower extremity  [place] Prescription Drug Monitoring Program report was reviewed and was appropriate       Complete risks and benefits including bleeding, infection, tissue reaction, nerve injury and allergic reaction were discussed  The approach was demonstrated using models and literature was provided  Verbal and written consent was obtained  Discharge instructions were provided  I personally saw and examined the patient and I agree with the above discussed plan of care  History of Present Illness:    Jody Pritchard is a 62 y o  female who presents to [facility] for interval re-evaluation of the above stated pain complaints  The patient has a past medical and chronic pain history as outlined in the assessment section  She was last seen on 03/03/2022 for left L4 and L5 TFESI with notable relief for 2 weeks  Returns today with 7/10 pain which is same since last visit  She reports numbness in the dorsum of the left foot  Also reports dull/aching pain and sharp pain  Also reports low back pain as well       She continues to see rheumatologist in regards to her rheumatoid arthritis  She is currently on prednisone 10 mg daily p r n  Other than as stated above, the patient denies any interval changes in medications, medical condition, mental condition, symptoms, or allergies since the last office visit           Review of Systems:    Review of Systems   Musculoskeletal:        LEFT foot pain         Patient Active Problem List   Diagnosis    Seronegative rheumatoid arthritis ([place] [address] )    Mixed hyperlipidemia    Muscle pain, fibromyalgia    IFG (impaired fasting glucose)    Chronic obstructive pulmonary disease, unspecified COPD type ([place] [address] )    H/O gastric sleeve    History of breast cancer in female    Hypothyroidism due to Hashimoto's thyroiditis    Meningioma [facility])       Past Medical History:   Diagnosis Date    Cancer ([place] [address] )     breast    COPD (chronic obstructive pulmonary disease) (HCC)     Disease of thyroid gland     Emphysema lung (HCC)     Fibromyalgia     GERD (gastroesophageal reflux disease)     Hashimoto's thyroiditis     History of breast cancer     RA (rheumatoid arthritis) ([place] [address] )     Rheumatoid arthritis involving multiple sites [facility])        Past Surgical History:   Procedure Laterality Date    APPENDECTOMY      BREAST LUMPECTOMY Left     CHOLECYSTECTOMY      CYST REMOVAL  04/01/2022    GASTRIC BYPASS      ROTATOR CUFF REPAIR Right     SHOULDER SURGERY Right     Muscle tendon transfer    [address] [place] [address] N/A 02/23/2022    Procedure: EXCISIONAL DEBRIDEMENT OF BACK;  Surgeon: Jodi Trejo MD;  Location: [facility];  Service: General       Family History   Problem Relation Age of Onset    Skin cancer Mother     Alzheimer's disease Mother     Cirrhosis Father     Coronary artery disease Brother     Cirrhosis Brother     Breast cancer Maternal Grandmother        Social History     Occupational History    Not on file   Tobacco Use    Smoking status: Former Smoker    Smokeless tobacco: Never Used   Vaping Use    Vaping Use: Never used   Substance and Sexual Activity    Alcohol use: Yes     Alcohol/week: 14 0 standard drinks     Types: 14 Glasses of wine per week     Comment: a week    Drug use: Never    Sexual activity: Not Currently     Partners: Male         Current Outpatient Medications:     albuterol (Ventolin HFA) 90 mcg/act inhaler, Inhale 2 puffs every 4 (four) hours as needed for wheezing or shortness of breath (Patient taking differently: Inhale 2 puffs every 4 (four) hours as needed for wheezing or shortness of breath PRN), Disp: 8 g, Rfl: 2    Biotin 1000 MCG tablet, Take 1,000 mcg by mouth daily, Disp: , Rfl:     cetirizine (ZyrTEC) 10 mg tablet, Take 1 tablet (10 mg total) by mouth daily, Disp: 90 tablet, Rfl: 1    Cholecalciferol 25 MCG (1000 UT) tablet, Take by mouth, Disp: , Rfl:     clonazePAM (KlonoPIN) 0 5 mg tablet, Take 0 5 mg by mouth daily as needed PRN, Disp: , Rfl:     Cyanocobalamin (VITAMIN B-12 PO), Take by mouth, Disp: , Rfl:     Diclofenac Epolamine 1 3 % PTCH, 2 (two) times a day as needed  , Disp: , Rfl:     fluticasone (FLONASE) 50 mcg/act nasal spray, fluticasone propionate 50 mcg/act susp, Disp: , Rfl:     folic acid (FOLVITE) 1 mg tablet, Take 1 tablet by mouth daily except on Wednesdays   , Disp: , Rfl:     gabapentin (NEURONTIN) 300 mg capsule, Take 900 mg by mouth daily Take 300 in AM and 600 in PM , Disp: , Rfl:    ibuprofen (MOTRIN) 800 mg tablet, Take 800 mg by mouth as needed PRN, Disp: , Rfl:     letrozole (FEMARA) 2 5 mg tablet, Take 2 5 mg by mouth daily, Disp: , Rfl:     levothyroxine 112 mcg tablet, Take 112 mcg by mouth in the morning, Disp: , Rfl:     lidocaine-prilocaine (EMLA) cream, Apply topically, Disp: , Rfl:     magnesium oxide (MAG-OX) 400 mg tablet, Take by mouth 2 (two) times a day, Disp: , Rfl:     methotrexate 2 5 mg tablet, Takes 8 pills weekly on wednesday , Disp: , Rfl:     montelukast (SINGULAIR) 10 mg tablet, Take 10 mg by mouth, Disp: , Rfl:     nystatin-triamcinolone (MYCOLOG-II) cream, PRN, Disp: , Rfl:     omeprazole (PriLOSEC) 40 MG capsule, Take 1 capsule (40 mg total) by mouth daily, Disp: 90 capsule, Rfl: 2    predniSONE 10 mg tablet, Take 1 tablet (10 mg total) by mouth daily as needed (inflammation) prn, Disp: 30 tablet, Rfl: 0    SUMAtriptan (Imitrex) 25 mg tablet, 1 p o  at headache onset, may repeat 1 after 2 hours p r n , Disp: 9 tablet, Rfl: 5    venlafaxine (EFFEXOR-XR) 75 mg 24 hr capsule, Take 1 capsule by mouth daily, Disp: , Rfl:     Allergies   Allergen Reactions    Ciprofloxacin Hives    Keflex [Cephalexin] Hives    Penicillins Hives    Sulfa Antibiotics Hives       Physical Exam:    /71 (BP Location: Right arm, Patient Position: Sitting, Cuff Size: Standard)   Pulse 84   Ht 5' 3" (1 6 m)   Wt 79 4 kg (175 lb)   BMI 31 00 kg/m²     Constitutional:normal, well developed, well nourished, alert, in no distress and non-toxic and no overt pain behavior    Eyes:anicteric  HEENT:grossly intact  Neck:supple, symmetric, trachea midline and no masses   Pulmonary:even and unlabored  Cardiovascular:No edema or pitting edema present  Skin:Normal without rashes or lesions and well hydrated  Psychiatric:Mood and affect appropriate  Neurologic:Cranial Nerves II-XII grossly intact  Musculoskeletal:normal      Imaging  FL spine and pain procedure    (Results Pending) Orders Placed This Encounter   Procedures    FL spine and pain procedure

## 2022-06-17 ENCOUNTER — OFFICE VISIT (OUTPATIENT)
Dept: FAMILY MEDICINE CLINIC | Facility: CLINIC | Age: 62
End: 2022-06-17
Payer: MEDICARE

## 2022-06-17 VITALS
BODY MASS INDEX: 30.83 KG/M2 | WEIGHT: 174 LBS | HEIGHT: 63 IN | TEMPERATURE: 98 F | OXYGEN SATURATION: 96 % | DIASTOLIC BLOOD PRESSURE: 70 MMHG | SYSTOLIC BLOOD PRESSURE: 114 MMHG | HEART RATE: 80 BPM

## 2022-06-17 DIAGNOSIS — D50.9 IRON DEFICIENCY ANEMIA, UNSPECIFIED IRON DEFICIENCY ANEMIA TYPE: ICD-10-CM

## 2022-06-17 DIAGNOSIS — M06.00 SERONEGATIVE RHEUMATOID ARTHRITIS (HCC): ICD-10-CM

## 2022-06-17 DIAGNOSIS — D56.3 THALASSEMIA MINOR: ICD-10-CM

## 2022-06-17 DIAGNOSIS — Z12.31 BREAST CANCER SCREENING BY MAMMOGRAM: Primary | ICD-10-CM

## 2022-06-17 DIAGNOSIS — J44.9 CHRONIC OBSTRUCTIVE PULMONARY DISEASE, UNSPECIFIED COPD TYPE (HCC): ICD-10-CM

## 2022-06-17 DIAGNOSIS — Z90.3 H/O GASTRIC SLEEVE: ICD-10-CM

## 2022-06-17 PROBLEM — D32.9 MENINGIOMA (HCC): Status: ACTIVE | Noted: 2019-03-27

## 2022-06-17 PROBLEM — Z85.3 HISTORY OF BREAST CANCER IN FEMALE: Status: ACTIVE | Noted: 2019-02-27

## 2022-06-17 PROBLEM — E03.8 HYPOTHYROIDISM DUE TO HASHIMOTO'S THYROIDITIS: Status: ACTIVE | Noted: 2018-01-15

## 2022-06-17 PROBLEM — E06.3 HYPOTHYROIDISM DUE TO HASHIMOTO'S THYROIDITIS: Status: ACTIVE | Noted: 2018-01-15

## 2022-06-17 PROCEDURE — 99214 OFFICE O/P EST MOD 30 MIN: CPT | Performed by: FAMILY MEDICINE

## 2022-06-17 RX ORDER — PREDNISONE 10 MG/1
10 TABLET ORAL DAILY PRN
Qty: 30 TABLET | Refills: 0 | Status: SHIPPED | OUTPATIENT
Start: 2022-06-17

## 2022-06-17 NOTE — PROGRESS NOTES
Assessment/Plan:    No problem-specific Assessment & Plan notes found for this encounter  Diagnoses and all orders for this visit:    Breast cancer screening by mammogram  -     Mammo screening bilateral w 3d & cad; Future    Seronegative rheumatoid arthritis ([facility] [address] )  -     predniSONE 10 mg tablet; Take 1 tablet (10 mg total) by mouth daily as needed (inflammation) prn    H/O gastric sleeve  -     CBC and differential; Future    Iron deficiency anemia, unspecified iron deficiency anemia type  -     CBC and differential; Future    Thalassemia minor  -     CBC and differential; Future    Chronic obstructive pulmonary disease, unspecified COPD type ([facility] [address] )  Breathing is stable and at her baseline  Continue current management  Subjective:      Patient ID: Emili Sweet is a 62 y o  female  HPI    Patient presents to the office for 6 month follow up, states that things are going well  She is complaint with her medications and has no concerns  Mild PND from allergies, using zyrtec, Flonase and monteleukast  This has imporved since allergy season  The following portions of the patient's history were reviewed and updated as appropriate: allergies, current medications, past family history, past medical history, past social history, past surgical history and problem list     Review of Systems      Objective:  /70 (BP Location: Left arm, Patient Position: Sitting, Cuff Size: Large)   Pulse 80   Temp 98 °F (36 7 °C)   Ht 5' 3" (1 6 m)   Wt 78 9 kg (174 lb)   SpO2 96%   BMI 30 82 kg/m²      Physical Exam  Vitals reviewed  Constitutional:       General: She is not in acute distress  Appearance: Normal appearance  HENT:      Head: Normocephalic and atraumatic  Right Ear: External ear normal       Left Ear: External ear normal       Nose: Nose normal       Mouth/Throat:      Mouth: Mucous membranes are moist    Eyes:      Extraocular Movements: Extraocular movements intact  Conjunctiva/sclera: Conjunctivae normal       Pupils: Pupils are equal, round, and reactive to light  Cardiovascular:      Rate and Rhythm: Normal rate and regular rhythm  Heart sounds: Normal heart sounds  Pulmonary:      Effort: Pulmonary effort is normal       Breath sounds: Normal breath sounds  No wheezing, rhonchi or rales  Abdominal:      General: Bowel sounds are normal  There is no distension  Palpations: Abdomen is soft  Tenderness: There is no abdominal tenderness  Musculoskeletal:      Right lower leg: No edema  Left lower leg: No edema  Skin:     General: Skin is warm  Capillary Refill: Capillary refill takes less than 2 seconds  Findings: No rash  Neurological:      Mental Status: She is alert  Mental status is at baseline             DO Charlotte Burns [facility]  6/17/2022 8:52 AM

## 2022-06-20 ENCOUNTER — OFFICE VISIT (OUTPATIENT)
Dept: PAIN MEDICINE | Facility: CLINIC | Age: 62
End: 2022-06-20
Payer: MEDICARE

## 2022-06-20 VITALS
DIASTOLIC BLOOD PRESSURE: 71 MMHG | SYSTOLIC BLOOD PRESSURE: 110 MMHG | HEART RATE: 84 BPM | BODY MASS INDEX: 31.01 KG/M2 | HEIGHT: 63 IN | WEIGHT: 175 LBS

## 2022-06-20 DIAGNOSIS — G89.4 CHRONIC PAIN SYNDROME: ICD-10-CM

## 2022-06-20 DIAGNOSIS — M79.7 MUSCLE PAIN, FIBROMYALGIA: ICD-10-CM

## 2022-06-20 DIAGNOSIS — M54.16 LUMBAR RADICULOPATHY: Primary | ICD-10-CM

## 2022-06-20 PROCEDURE — 99214 OFFICE O/P EST MOD 30 MIN: CPT | Performed by: STUDENT IN AN ORGANIZED HEALTH CARE EDUCATION/TRAINING PROGRAM

## 2022-07-06 ENCOUNTER — TELEPHONE (OUTPATIENT)
Dept: FAMILY MEDICINE CLINIC | Facility: CLINIC | Age: 62
End: 2022-07-06

## 2022-07-06 ENCOUNTER — OFFICE VISIT (OUTPATIENT)
Dept: FAMILY MEDICINE CLINIC | Facility: CLINIC | Age: 62
End: 2022-07-06
Payer: MEDICARE

## 2022-07-06 VITALS
BODY MASS INDEX: 30.83 KG/M2 | OXYGEN SATURATION: 99 % | TEMPERATURE: 97.2 F | HEART RATE: 82 BPM | DIASTOLIC BLOOD PRESSURE: 74 MMHG | HEIGHT: 63 IN | WEIGHT: 174 LBS | SYSTOLIC BLOOD PRESSURE: 124 MMHG

## 2022-07-06 DIAGNOSIS — F32.1 MODERATE MAJOR DEPRESSION, SINGLE EPISODE (HCC): ICD-10-CM

## 2022-07-06 DIAGNOSIS — F32.1 MODERATE MAJOR DEPRESSION, SINGLE EPISODE (HCC): Primary | ICD-10-CM

## 2022-07-06 PROCEDURE — 99214 OFFICE O/P EST MOD 30 MIN: CPT | Performed by: FAMILY MEDICINE

## 2022-07-06 RX ORDER — BUPROPION HYDROCHLORIDE 150 MG/1
150 TABLET ORAL EVERY MORNING
Qty: 90 TABLET | Refills: 3 | Status: SHIPPED | OUTPATIENT
Start: 2022-07-06 | End: 2022-09-02 | Stop reason: SDUPTHER

## 2022-07-06 NOTE — PATIENT INSTRUCTIONS
Depression   AMBULATORY CARE:   Depression  is a medical condition that causes feelings of sadness or hopelessness that do not go away  Depression may cause you to lose interest in things you used to enjoy  These feelings may interfere with your daily life  Common symptoms include the following:   · Appetite changes, or weight gain or loss    · Trouble going to sleep or staying asleep, or sleeping too much    · Fatigue or lack of energy    · Feeling restless, irritable, or withdrawn    · Feeling worthless, hopeless, discouraged, or guilty    · Trouble concentrating, remembering things, doing daily tasks, or making decisions    · Thoughts about hurting or killing yourself    Call your local emergency number (911 in the [address] [facility]) if:   · You think about harming yourself or someone else  · You have done something on purpose to hurt yourself  Call your therapist or doctor if:   · Your symptoms do not improve  · You cannot make it to your next appointment  · You have new symptoms  · You have questions or concerns about your condition or care  The following resources are available at any time to help you, if needed:   · [facility]: 6-906.106.9630 (2-566-382-IREK)     · Suicide Hotline: 1-742.220.8108 (3-582-LQSVQVY)     · For a list of international numbers: https://save org/find-help/international-resources/    Treatment for depression  may include medicine to relieve depression  Medicine is often used together with therapy  Therapy is a way for you to talk about your feelings and anything that may be causing depression  Therapy can be done alone or in a group  It may also be done with family members or a significant other  Self-care:   · Get regular physical activity  Try to be active for 30 minutes, 3 to 5 days a week  Physical activity can help relieve depression  Work with your healthcare provider to develop a plan that you enjoy   It may help to ask someone to be active with you  · Create a regular sleep schedule  A routine can help you relax before bed  Listen to music, read, or do yoga  Try to go to bed and wake up at the same time every day  Sleep is important for emotional health  · Eat a variety of healthy foods  Healthy foods include fruits, vegetables, whole-grain breads, low-fat dairy products, lean meats, fish, and cooked beans  A healthy meal plan is low in fat, salt, and added sugar  · Do not drink alcohol or use drugs  Alcohol and drugs can make depression worse  Talk to your therapist or doctor if you need help quitting  Follow up with your healthcare provider as directed: Your healthcare provider will monitor your progress at follow-up visits  He or she will also monitor your medicine if you take antidepressants  Your healthcare provider will ask if the medicine is helping  Tell him or her about any side effects or problems you may have with your medicine  The type or amount of medicine may need to be changed  Write down your questions so you remember to ask them during your visits  © Copyright [employer] 2022 Information is for End User's use only and may not be sold, redistributed or otherwise used for commercial purposes  All illustrations and images included in CareNotes® are the copyrighted property of A D A M , Inc  or Louise Medina   The above information is an  only  It is not intended as medical advice for individual conditions or treatments  Talk to your doctor, nurse or pharmacist before following any medical regimen to see if it is safe and effective for you
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
## 2022-07-06 NOTE — TELEPHONE ENCOUNTER
Call placed to patient, will send Wellbutrin XL  150 mg daily to pharmacy and follow up in 3 months  Tina Yarbrough DO  [facility]  7/6/2022 3:39 PM          ----- Message from Vin Yo MD sent at 7/6/2022  3:08 PM EDT -----  Regarding: RE: Medication Inititation  Her meningioma is very tiny at 6 mm and not causing any mass effect  The risk of Wellbutrin causing a seizure related this is very low and will not have problems initiating at that dose  Have a great day,   Lynne Greer  ----- Message -----  From: Tina Yarbrough DO  Sent: 7/6/2022   2:05 PM EDT  To: Vin Yo MD  Subject: Medication Inititation                           Hello! I am reaching out to you about the attached mutual patient  She follows with you for her Migraines and Meningioma  I saw her in the office today to discuss weight loss and depression  We talked about a multitude of medications and ultimately, I would like to start her on Wellbutrin  She has not seizure history  I know that the presence of the meningioma complicates this as the Wellbutrin lowers the seizure threshold  Patient was very interested in this medication as it would likely benefit her from both standpoints  I was wondering if you could give me your thoughts on starting her on the Wellbutrin  The plan was for Wellbutrin  mg daily  Thank you in advance for your time!     Tina Yarbrough DO  [facility]  7/6/2022 2:05 PM

## 2022-07-07 ENCOUNTER — HOSPITAL ENCOUNTER (OUTPATIENT)
Dept: RADIOLOGY | Facility: CLINIC | Age: 62
Discharge: HOME | End: 2022-07-07
Admitting: STUDENT IN AN ORGANIZED HEALTH CARE EDUCATION/TRAINING PROGRAM
Payer: MEDICARE

## 2022-07-07 VITALS
DIASTOLIC BLOOD PRESSURE: 69 MMHG | HEART RATE: 91 BPM | RESPIRATION RATE: 20 BRPM | OXYGEN SATURATION: 97 % | SYSTOLIC BLOOD PRESSURE: 131 MMHG | TEMPERATURE: 97.4 F

## 2022-07-07 DIAGNOSIS — M54.16 LUMBAR RADICULOPATHY: ICD-10-CM

## 2022-07-07 PROCEDURE — 64483 NJX AA&/STRD TFRM EPI L/S 1: CPT | Performed by: STUDENT IN AN ORGANIZED HEALTH CARE EDUCATION/TRAINING PROGRAM

## 2022-07-07 PROCEDURE — 64484 NJX AA&/STRD TFRM EPI L/S EA: CPT | Performed by: STUDENT IN AN ORGANIZED HEALTH CARE EDUCATION/TRAINING PROGRAM

## 2022-07-07 RX ORDER — PAPAVERINE HCL 150 MG
20 CAPSULE, EXTENDED RELEASE ORAL ONCE
Status: DISCONTINUED | OUTPATIENT
Start: 2022-07-07 | End: 2022-07-07

## 2022-07-07 RX ORDER — METHYLPREDNISOLONE ACETATE 80 MG/ML
80 INJECTION, SUSPENSION INTRA-ARTICULAR; INTRALESIONAL; INTRAMUSCULAR; PARENTERAL; SOFT TISSUE ONCE
Status: COMPLETED | OUTPATIENT
Start: 2022-07-07 | End: 2022-07-07

## 2022-07-07 RX ORDER — BUPIVACAINE HCL/PF 2.5 MG/ML
2 VIAL (ML) INJECTION ONCE
Status: COMPLETED | OUTPATIENT
Start: 2022-07-07 | End: 2022-07-07

## 2022-07-07 RX ADMIN — Medication 2 ML: at 09:43

## 2022-07-07 RX ADMIN — IOHEXOL 1 ML: 300 INJECTION, SOLUTION INTRAVENOUS at 09:43

## 2022-07-07 RX ADMIN — METHYLPREDNISOLONE ACETATE 80 MG: 80 INJECTION, SUSPENSION INTRA-ARTICULAR; INTRALESIONAL; INTRAMUSCULAR; PARENTERAL; SOFT TISSUE at 09:43

## 2022-07-07 NOTE — DISCHARGE INSTR - LAB
Epidural Steroid Injection   WHAT YOU NEED TO KNOW:   An epidural steroid injection (JOHN) is a procedure to inject steroid medicine into the epidural space  The epidural space is between your spinal cord and vertebrae  Steroids reduce inflammation and fluid buildup in your spine that may be causing pain  You may be given pain medicine along with the steroids  ACTIVITY  Do not drive or operate machinery today  No strenuous activity today - bending, lifting, etc   You may resume normal activites starting tomorrow - start slowly and as tolerated  You may shower today, but no tub baths or hot tubs  You may have numbness for several hours from the local anesthetic  Please use caution and common sense, especially with weight-bearing activities  CARE OF THE INJECTION SITE  If you have soreness or pain, apply ice to the area today (20 minutes on/20 minutes off)  Starting tomorrow, you may use warm, moist heat or ice if needed  You may have an increase or change in your discomfort for 36-48 hours after your treatment  Apply ice and continue with any pain medication you have been prescribed  Notify the [facility] if you have any of the following: redness, drainage, swelling, headache, stiff neck or fever above 100°F     SPECIAL INSTRUCTIONS  Our office will contact you in approximately 7 days for a progress report  MEDICATIONS  Continue to take all routine medications  Our office may have instructed you to hold some medications  As no general anesthesia was used in today's procedure, you should not experience any side effects related to anesthesia  If you have a problem specifically related to your procedure, please call our office at [phone]  Problems not related to your procedure should be directed to your primary care physician

## 2022-07-07 NOTE — INTERVAL H&P NOTE
Update: (This section must be completed if the H&P was completed greater than 24 hrs to procedure or admission)    H&P reviewed  After examining the patient, I find no changed to the H&P since it had been written  Patient re-evaluated   Accept as history and physical     Laney George MD/July 7, 2022/9:28 AM

## 2022-07-07 NOTE — INTERVAL H&P NOTE
Update: (This section must be completed if the H&P was completed greater than 24 hrs to procedure or admission)    H&P reviewed  After examining the patient, I find no changed to the H&P since it had been written  Patient re-evaluated   Accept as history and physical     Lorraine Mae MD/July 7, 2022/9:32 AM

## 2022-07-21 DIAGNOSIS — Z91.09 ENVIRONMENTAL ALLERGIES: ICD-10-CM

## 2022-07-21 RX ORDER — CETIRIZINE HYDROCHLORIDE 10 MG/1
TABLET ORAL
Qty: 90 TABLET | Refills: 1 | Status: SHIPPED | OUTPATIENT
Start: 2022-07-21

## 2022-08-11 DIAGNOSIS — M06.00 SERONEGATIVE RHEUMATOID ARTHRITIS (HCC): ICD-10-CM

## 2022-08-11 RX ORDER — PREDNISONE 10 MG/1
10 TABLET ORAL DAILY PRN
Qty: 30 TABLET | Refills: 0 | Status: SHIPPED | OUTPATIENT
Start: 2022-08-11 | End: 2022-09-08

## 2022-08-29 ENCOUNTER — PATIENT MESSAGE (OUTPATIENT)
Dept: FAMILY MEDICINE CLINIC | Facility: CLINIC | Age: 62
End: 2022-08-29

## 2022-08-29 DIAGNOSIS — F32.1 MODERATE MAJOR DEPRESSION, SINGLE EPISODE (HCC): ICD-10-CM

## 2022-09-02 RX ORDER — BUPROPION HYDROCHLORIDE 150 MG/1
150 TABLET ORAL EVERY MORNING
Qty: 30 TABLET | Refills: 3 | Status: SHIPPED | OUTPATIENT
Start: 2022-09-02

## 2022-09-02 NOTE — PATIENT COMMUNICATION
Called patient  Discussed GoodRx, new script sent to [facility] where Wellbutrin XL is $10 84/30 days  Sent GoodRx link to patient  Patient was very thankful for savings       Emerson Herrera DO  [facility]  9/2/2022 12:05 PM

## 2022-09-08 DIAGNOSIS — M06.00 SERONEGATIVE RHEUMATOID ARTHRITIS (HCC): ICD-10-CM

## 2022-09-08 RX ORDER — PREDNISONE 10 MG/1
TABLET ORAL
Qty: 30 TABLET | Refills: 0 | Status: SHIPPED | OUTPATIENT
Start: 2022-09-08 | End: 2022-10-06

## 2022-09-29 ENCOUNTER — RA CDI HCC (OUTPATIENT)
Dept: OTHER | Facility: HOSPITAL | Age: 62
End: 2022-09-29

## 2022-09-30 DIAGNOSIS — J44.9 CHRONIC OBSTRUCTIVE PULMONARY DISEASE, UNSPECIFIED COPD TYPE (HCC): ICD-10-CM

## 2022-09-30 RX ORDER — ALBUTEROL SULFATE 90 UG/1
2 AEROSOL, METERED RESPIRATORY (INHALATION) EVERY 4 HOURS PRN
Qty: 8 G | Refills: 0 | Status: SHIPPED | OUTPATIENT
Start: 2022-09-30 | End: 2022-10-27

## 2022-10-06 ENCOUNTER — OFFICE VISIT (OUTPATIENT)
Dept: FAMILY MEDICINE CLINIC | Facility: CLINIC | Age: 62
End: 2022-10-06
Payer: MEDICARE

## 2022-10-06 VITALS
DIASTOLIC BLOOD PRESSURE: 64 MMHG | OXYGEN SATURATION: 98 % | SYSTOLIC BLOOD PRESSURE: 124 MMHG | TEMPERATURE: 96.5 F | BODY MASS INDEX: 31.01 KG/M2 | WEIGHT: 175 LBS | HEIGHT: 63 IN | HEART RATE: 88 BPM

## 2022-10-06 DIAGNOSIS — R09.82 PND (POST-NASAL DRIP): ICD-10-CM

## 2022-10-06 DIAGNOSIS — Z91.09 ENVIRONMENTAL ALLERGIES: ICD-10-CM

## 2022-10-06 DIAGNOSIS — K12.0 ORAL APHTHOUS ULCER: ICD-10-CM

## 2022-10-06 DIAGNOSIS — F32.1 MODERATE MAJOR DEPRESSION, SINGLE EPISODE (HCC): Primary | ICD-10-CM

## 2022-10-06 DIAGNOSIS — M06.00 SERONEGATIVE RHEUMATOID ARTHRITIS (HCC): ICD-10-CM

## 2022-10-06 PROCEDURE — 99214 OFFICE O/P EST MOD 30 MIN: CPT | Performed by: FAMILY MEDICINE

## 2022-10-06 RX ORDER — PREDNISONE 10 MG/1
TABLET ORAL
Qty: 30 TABLET | Refills: 0 | Status: SHIPPED | OUTPATIENT
Start: 2022-10-06

## 2022-10-06 RX ORDER — FLUTICASONE PROPIONATE 50 MCG
2 SPRAY, SUSPENSION (ML) NASAL DAILY
Qty: 11.1 ML | Refills: 2 | Status: SHIPPED | OUTPATIENT
Start: 2022-10-06 | End: 2022-10-20

## 2022-10-06 NOTE — PROGRESS NOTES
Name: Susan Menjivar      : 1960      MRN: [id number]  Encounter Provider: Juan Carlos Garcia DO  Encounter Date: 10/6/2022   Encounter department: [facility] [address] [address]     1  Moderate major depression, single episode ([address] )    2  PND (post-nasal drip)  -     fluticasone (FLONASE) 50 mcg/act nasal spray; 2 sprays into each nostril daily    3  Environmental allergies  -     fluticasone (FLONASE) 50 mcg/act nasal spray; 2 sprays into each nostril daily    4  Oral aphthous ulcer  -     benzocaine (ORAJEL) 10 % mucosal gel; Apply 1 application to the mouth or throat as needed (apthous ulcer)     Will for 40 mg of Prednisone x 5 days  Patient has prednisone at home  Has not used in a long time  Continue with Wellbutrin  mg daily and follow up in 1 month, consider dose adjustment at that time  BMI Counseling: Body mass index is 31 kg/m²  The BMI is above normal  Nutrition recommendations include decreasing portion sizes, encouraging healthy choices of fruits and vegetables, consuming healthier snacks, limiting drinks that contain sugar, moderation in carbohydrate intake, increasing intake of lean protein and reducing intake of cholesterol  Exercise recommendations include moderate physical activity 150 minutes/week and exercising 3-5 times per week  No pharmacotherapy was ordered  Rationale for BMI follow-up plan is due to patient being overweight or obese  Subjective      HPI   Patient presents to the office for mental health follow up  States that she has been taking the Wellbutrin  mg daily for about 3 months, states that she feels that this has been helpful  She has had a lot of stress at home and has been having a lot of things going on at home  Notes that her son is living with them and this has been causing a lot of tension between her and her   Her son has had issues with addiction previously   They have been  for 25 years  PHQ-2/9 Depression Screening    Little interest or pleasure in doing things: 1 - several days  Feeling down, depressed, or hopeless: 1 - several days  Trouble falling or staying asleep, or sleeping too much: 2 - more than half the days  Feeling tired or having little energy: 0 - not at all  Poor appetite or overeatin - more than half the days  Feeling bad about yourself - or that you are a failure or have let yourself or your family down: 1 - several days  Trouble concentrating on things, such as reading the newspaper or watching television: 0 - not at all  Moving or speaking so slowly that other people could have noticed  Or the opposite - being so fidgety or restless that you have been moving around a lot more than usual: 0 - not at all  Thoughts that you would be better off dead, or of hurting yourself in some way: 0 - not at all  PHQ-2 Score: 2  PHQ-2 Interpretation: Negative depression screen  PHQ-9 Score: 7   PHQ-9 Interpretation: Mild depression        Notes that she started with allergies after cutting trees on 22  Started with sneezing, sore throat  Taking OTC sinus medication  States that since that time, her left ear has been clogged, low grade fever, sore throat improved, fatigue developed  On Monday she started with nasal soreness and fever blister  Has not been wearing dentures due to blister in the mouth  Review of Systems   Constitutional: Negative for chills, fatigue and fever  HENT: Negative for congestion, ear pain, rhinorrhea and sore throat  Eyes: Negative for pain  Respiratory: Negative for cough and shortness of breath  Cardiovascular: Negative for chest pain and leg swelling  Gastrointestinal: Negative for abdominal pain, constipation, diarrhea, nausea and vomiting  Genitourinary: Negative for dysuria, frequency and urgency  Musculoskeletal: Negative for gait problem  Skin: Negative for rash     Neurological: Negative for dizziness, light-headedness and headaches  Current Outpatient Medications on File Prior to Visit   Medication Sig    albuterol (Ventolin HFA) 90 mcg/act inhaler Inhale 2 puffs every 4 (four) hours as needed for wheezing or shortness of breath    Biotin 1000 MCG tablet Take 1,000 mcg by mouth daily    buPROPion (Wellbutrin XL) 150 mg 24 hr tablet Take 1 tablet (150 mg total) by mouth every morning    cetirizine (ZyrTEC) 10 mg tablet TAKE 1 TABLET BY MOUTH EVERY DAY    Cholecalciferol 25 MCG (1000 UT) tablet Take by mouth    clonazePAM (KlonoPIN) 0 5 mg tablet Take 0 5 mg by mouth daily as needed PRN    Cyanocobalamin (VITAMIN B-12 PO) Take by mouth    Diclofenac Epolamine 1 3 % PTCH 2 (two) times a day as needed      folic acid (FOLVITE) 1 mg tablet Take 1 tablet by mouth daily except on Wednesdays       gabapentin (NEURONTIN) 300 mg capsule Take 900 mg by mouth daily Take 300 in AM and 600 in PM     ibuprofen (MOTRIN) 800 mg tablet Take 800 mg by mouth as needed PRN    letrozole (FEMARA) 2 5 mg tablet Take 2 5 mg by mouth daily    levothyroxine 112 mcg tablet Take 112 mcg by mouth in the morning    lidocaine-prilocaine (EMLA) cream Apply topically    magnesium oxide (MAG-OX) 400 mg tablet Take by mouth 2 (two) times a day    methotrexate 2 5 mg tablet Takes 8 pills weekly on wednesday     montelukast (SINGULAIR) 10 mg tablet Take 10 mg by mouth    nystatin-triamcinolone (MYCOLOG-II) cream PRN    omeprazole (PriLOSEC) 40 MG capsule Take 1 capsule (40 mg total) by mouth daily    predniSONE 10 mg tablet TAKE 1 TABLET (10 MG TOTAL) BY MOUTH DAILY AS NEEDED (INFLAMMATION) AS NEEDED    SUMAtriptan (Imitrex) 25 mg tablet 1 p o  at headache onset, may repeat 1 after 2 hours p r n     venlafaxine (EFFEXOR-XR) 75 mg 24 hr capsule Take 1 capsule by mouth daily    [DISCONTINUED] fluticasone (FLONASE) 50 mcg/act nasal spray fluticasone propionate 50 mcg/act susp     Objective     /64 (BP Location: Left arm, Patient Position: Sitting, Cuff Size: Large)   Pulse 88   Temp (!) 96 5 °F (35 8 °C)   Ht 5' 3" (1 6 m)   Wt 79 4 kg (175 lb)   SpO2 98%   BMI 31 00 kg/m²     Physical Exam  Vitals reviewed  Constitutional:       General: She is not in acute distress  Appearance: Normal appearance  HENT:      Head: Normocephalic and atraumatic  Right Ear: Tympanic membrane, ear canal and external ear normal       Left Ear: Ear canal and external ear normal  A middle ear effusion is present  Nose: Mucosal edema and rhinorrhea present  Rhinorrhea is clear  Right Sinus: No maxillary sinus tenderness or frontal sinus tenderness  Left Sinus: No maxillary sinus tenderness or frontal sinus tenderness  Mouth/Throat:      Mouth: Mucous membranes are moist    Eyes:      Extraocular Movements: Extraocular movements intact  Conjunctiva/sclera: Conjunctivae normal       Pupils: Pupils are equal, round, and reactive to light  Cardiovascular:      Rate and Rhythm: Normal rate and regular rhythm  Heart sounds: Normal heart sounds  Pulmonary:      Effort: Pulmonary effort is normal       Breath sounds: Normal breath sounds  No wheezing, rhonchi or rales  Abdominal:      General: Abdomen is flat  Bowel sounds are normal  There is no distension  Palpations: Abdomen is soft  Tenderness: There is no abdominal tenderness  Musculoskeletal:         General: No deformity  Cervical back: Neck supple  Right lower leg: No edema  Left lower leg: No edema  Lymphadenopathy:      Cervical: No cervical adenopathy  Skin:     General: Skin is warm  Capillary Refill: Capillary refill takes less than 2 seconds  Findings: No rash  Neurological:      Mental Status: She is alert  Mental status is at baseline              Natalya Lal DO

## 2022-10-18 ENCOUNTER — TELEPHONE (OUTPATIENT)
Dept: FAMILY MEDICINE CLINIC | Facility: CLINIC | Age: 62
End: 2022-10-18

## 2022-10-18 NOTE — TELEPHONE ENCOUNTER
She can use OTC Abreva, if no improvement, follow up in the office       Bhaskar [facility]  10/18/2022 3:40 PM

## 2022-10-18 NOTE — TELEPHONE ENCOUNTER
Pt came to office to send message to Dr Shaunna Ross -- reports she was at urgent care recently and had a broken toe and bronchitis  She has now developed sores inside her nose that will not heal   Pt would like to know if she should have an appt or if there is medication/advice that can be given without an appt

## 2022-10-20 DIAGNOSIS — Z91.09 ENVIRONMENTAL ALLERGIES: ICD-10-CM

## 2022-10-20 DIAGNOSIS — R09.82 PND (POST-NASAL DRIP): ICD-10-CM

## 2022-10-20 RX ORDER — FLUTICASONE PROPIONATE 50 MCG
SPRAY, SUSPENSION (ML) NASAL
Qty: 48 ML | Refills: 1 | Status: SHIPPED | OUTPATIENT
Start: 2022-10-20

## 2022-10-27 ENCOUNTER — RA CDI HCC (OUTPATIENT)
Dept: OTHER | Facility: HOSPITAL | Age: 62
End: 2022-10-27

## 2022-10-27 DIAGNOSIS — J44.9 CHRONIC OBSTRUCTIVE PULMONARY DISEASE, UNSPECIFIED COPD TYPE (HCC): ICD-10-CM

## 2022-10-27 NOTE — PROGRESS NOTES
[facility]  coding opportunities          Chart Reviewed number of suggestions sent to Provider: 1  [id number]    Patients Insurance     Medicare Insurance: [employer]

## 2022-11-03 ENCOUNTER — OFFICE VISIT (OUTPATIENT)
Dept: FAMILY MEDICINE CLINIC | Facility: CLINIC | Age: 62
End: 2022-11-03

## 2022-11-03 VITALS
DIASTOLIC BLOOD PRESSURE: 62 MMHG | TEMPERATURE: 97.5 F | HEART RATE: 87 BPM | HEIGHT: 63 IN | OXYGEN SATURATION: 97 % | SYSTOLIC BLOOD PRESSURE: 118 MMHG | WEIGHT: 178 LBS | BODY MASS INDEX: 31.54 KG/M2

## 2022-11-03 DIAGNOSIS — Z91.09 ENVIRONMENTAL ALLERGIES: ICD-10-CM

## 2022-11-03 DIAGNOSIS — R73.01 IFG (IMPAIRED FASTING GLUCOSE): ICD-10-CM

## 2022-11-03 DIAGNOSIS — Z23 ENCOUNTER FOR IMMUNIZATION: Primary | ICD-10-CM

## 2022-11-03 DIAGNOSIS — E03.8 HYPOTHYROIDISM DUE TO HASHIMOTO'S THYROIDITIS: ICD-10-CM

## 2022-11-03 DIAGNOSIS — K12.0 APHTHOUS ULCER OF MOUTH: ICD-10-CM

## 2022-11-03 DIAGNOSIS — E06.3 HYPOTHYROIDISM DUE TO HASHIMOTO'S THYROIDITIS: ICD-10-CM

## 2022-11-03 DIAGNOSIS — E78.2 MIXED HYPERLIPIDEMIA: ICD-10-CM

## 2022-11-03 RX ORDER — FEXOFENADINE HCL 180 MG/1
180 TABLET ORAL DAILY
Qty: 30 TABLET | Refills: 2 | Status: SHIPPED | OUTPATIENT
Start: 2022-11-03

## 2022-11-03 RX ORDER — CYCLOBENZAPRINE HCL 5 MG
5 TABLET ORAL 3 TIMES DAILY PRN
COMMUNITY
Start: 2022-11-01

## 2022-11-03 RX ORDER — DEXAMETHASONE 0.5 MG/5ML
ELIXIR ORAL
COMMUNITY
Start: 2022-10-31

## 2022-11-03 NOTE — PROGRESS NOTES
Name: Maria Esther Ortiz      : 1960      MRN: [id number]  Encounter Provider: Yaneth Rich DO  Encounter Date: 11/3/2022   Encounter department: [facility] [place] [address] [address] [place] [address]     1  Encounter for immunization  -     influenza vaccine, quadrivalent, recombinant, PF, 0 5 mL, for patients 18 yr+ (FLUBLOK)    2  Aphthous ulcer of mouth  -     al mag oxide-diphenhydramine-lidocaine viscous (MAGIC MOUTHWASH) 1:1:1 suspension; Swish and spit 10 mL every 6 (six) hours as needed for mouth pain or discomfort    3  Environmental allergies  -     fexofenadine (ALLEGRA) 180 MG tablet; Take 1 tablet (180 mg total) by mouth daily    4  Hypothyroidism due to Hashimoto's thyroiditis  -     TSH, 3rd generation with Free T4 reflex; Future    5  Mixed hyperlipidemia  -     CBC and differential; Future  -     Comprehensive metabolic panel; Future  -     Lipid panel; Future    6  IFG (impaired fasting glucose)  -     CBC and differential; Future  -     Comprehensive metabolic panel; Future  -     HEMOGLOBIN A1C W/ EAG ESTIMATION; Future       Subjective      HPI     Patient reports that she wears uppers, notes that she had a sore and thought it might be related to her dentures  Notes that she has moved out of the house and she is in her own place with her son  States that things have settled  Notes that she is under a lot of stress  States that she fractured her 4th toe on the right foot about 3 weeks ago but stubbing it  She had an XR at urgent care  States that she taped it but since she has been on her feet some much with moving, she keeps stubbing it      PHQ-2/9 Depression Screening    Little interest or pleasure in doing things: 0 - not at all  Feeling down, depressed, or hopeless: 1 - several days  PHQ-2 Score: 1  PHQ-2 Interpretation: Negative depression screen       States that she did not and does not feel trapped and she is feeling that this is a very positive thing  Review of Systems    Current Outpatient Medications on File Prior to Visit   Medication Sig   • benzocaine (ORAJEL) 10 % mucosal gel Apply 1 application to the mouth or throat as needed (apthous ulcer)   • Biotin 1000 MCG tablet Take 1,000 mcg by mouth daily   • buPROPion (Wellbutrin XL) 150 mg 24 hr tablet Take 1 tablet (150 mg total) by mouth every morning   • Cholecalciferol 25 MCG (1000 UT) tablet Take by mouth   • clonazePAM (KlonoPIN) 0 5 mg tablet Take 0 5 mg by mouth daily as needed PRN   • Cyanocobalamin (VITAMIN B-12 PO) Take by mouth   • cyclobenzaprine (FLEXERIL) 5 mg tablet Take 5 mg by mouth 3 (three) times a day as needed   • dexamethasone 0 5 MG/5ML elixir TAKE 5 ML BY MOUTH 3 TIMES PER DAY FOR 10 DAYS   • Diclofenac Epolamine 1 3 % PTCH 2 (two) times a day as needed     • Diclofenac Sodium (VOLTAREN) 1 % APPLY 1 APPLICATION TOPICALLY 4 (FOUR) TIMES A DAY  APPLY TO AFFECTED AREA  • fluticasone (FLONASE) 50 mcg/act nasal spray SPRAY 2 SPRAYS INTO EACH NOSTRIL EVERY DAY   • folic acid (FOLVITE) 1 mg tablet Take 1 tablet by mouth daily except on Wednesdays      • gabapentin (NEURONTIN) 300 mg capsule Take 900 mg by mouth daily Take 300 in AM and 600 in PM    • ibuprofen (MOTRIN) 800 mg tablet Take 800 mg by mouth as needed PRN   • letrozole (FEMARA) 2 5 mg tablet Take 2 5 mg by mouth daily   • levothyroxine 112 mcg tablet Take 112 mcg by mouth in the morning   • magnesium oxide (MAG-OX) 400 mg tablet Take by mouth 2 (two) times a day   • methotrexate 2 5 mg tablet Takes 8 pills weekly on wednesday    • montelukast (SINGULAIR) 10 mg tablet Take 10 mg by mouth   • nystatin-triamcinolone (MYCOLOG-II) cream PRN   • omeprazole (PriLOSEC) 40 MG capsule Take 1 capsule (40 mg total) by mouth daily   • SUMAtriptan (Imitrex) 25 mg tablet 1 p o  at headache onset, may repeat 1 after 2 hours p r n    • venlafaxine (EFFEXOR-XR) 75 mg 24 hr capsule Take 1 capsule by mouth daily   • Ventolin HFA 108 (90 Base) MCG/ACT inhaler INHALE 2 PUFFS EVERY 4 HOURS AS NEEDED FOR WHEEZING OR SHORTNESS OF BREATH   • [DISCONTINUED] cetirizine (ZyrTEC) 10 mg tablet TAKE 1 TABLET BY MOUTH EVERY DAY   • [DISCONTINUED] lidocaine-prilocaine (EMLA) cream Apply topically   • predniSONE 10 mg tablet TAKE 1 TABLET BY MOUTH EVERY DAY AS NEEDED FOR INFLIMMATION (Patient not taking: Reported on 11/3/2022)     Objective     /62   Pulse 87   Temp 97 5 °F (36 4 °C)   Ht 5' 3" (1 6 m)   Wt 80 7 kg (178 lb)   SpO2 97%   BMI 31 53 kg/m²     Physical Exam  Vitals reviewed  Constitutional:       General: She is not in acute distress  Appearance: Normal appearance  HENT:      Head: Normocephalic and atraumatic  Right Ear: External ear normal       Left Ear: External ear normal       Nose: Nose normal       Mouth/Throat:      Mouth: Mucous membranes are moist       Comments: Upper right gum with ulcer  Eyes:      Extraocular Movements: Extraocular movements intact  Conjunctiva/sclera: Conjunctivae normal    Cardiovascular:      Rate and Rhythm: Normal rate and regular rhythm  Heart sounds: Normal heart sounds  Pulmonary:      Effort: Pulmonary effort is normal       Breath sounds: Normal breath sounds  No wheezing, rhonchi or rales  Musculoskeletal:      Cervical back: Neck supple  Right lower leg: No edema  Left lower leg: No edema  Lymphadenopathy:      Cervical: No cervical adenopathy  Skin:     General: Skin is warm  Capillary Refill: Capillary refill takes less than 2 seconds  Findings: No rash  Neurological:      Mental Status: She is alert  Mental status is at baseline          Osiris Jones DO

## 2022-11-21 ENCOUNTER — OFFICE VISIT (OUTPATIENT)
Dept: NEUROLOGY | Facility: CLINIC | Age: 62
End: 2022-11-21

## 2022-11-21 VITALS
BODY MASS INDEX: 31.01 KG/M2 | WEIGHT: 175 LBS | HEIGHT: 63 IN | DIASTOLIC BLOOD PRESSURE: 74 MMHG | HEART RATE: 82 BPM | SYSTOLIC BLOOD PRESSURE: 140 MMHG

## 2022-11-21 DIAGNOSIS — D32.9 MENINGIOMA (HCC): ICD-10-CM

## 2022-11-21 DIAGNOSIS — G43.109 MIGRAINE WITH AURA AND WITHOUT STATUS MIGRAINOSUS, NOT INTRACTABLE: Primary | ICD-10-CM

## 2022-11-21 DIAGNOSIS — E23.7 ABNORMALITY OF PITUITARY GLAND (HCC): ICD-10-CM

## 2022-11-21 NOTE — PROGRESS NOTES
Brody Prieto is a 65 y o  female returns in follow-up today with history migraine headache, meningioma and pituitary lesion    Assessment:  1  Migraine with aura and without status migrainosus, not intractable    2  Meningioma ([place] [address] )    3  Abnormality of pituitary gland ([place] [address] )        Plan:  Imitrex as needed  Follow-up 6 months    Discussion:  Eli Monsalve reports currently her headaches are under good control occurring infrequently and finds that Imitrex is effective in stopping it when she gets 1  Will continue current management and I will see her back in 6 months  Anticipate repeating MRI in a couple of years      Subjective:    HPI  Eli Monsalve returns in follow-up today  She reports that since September she has been doing very well having very infrequent headaches  When she gets a headache she finds Imitrex is effective  She states that prior to his September she was under lot of stress due to some interpersonal issues and did have some increased headache frequency during that time    She denies any new health issues      Past Medical History:   Diagnosis Date   • Cancer ([place] [address] )     breast   • COPD (chronic obstructive pulmonary disease) (HCC)    • Disease of thyroid gland    • Emphysema lung (HCC)    • Fibromyalgia    • GERD (gastroesophageal reflux disease)    • Hashimoto's thyroiditis    • History of breast cancer    • RA (rheumatoid arthritis) ([place] [address] )    • Rheumatoid arthritis involving multiple sites ([place] [address] )        Family History:  Family History   Problem Relation Age of Onset   • Skin cancer Mother    • Alzheimer's disease Mother    • Cirrhosis Father    • Coronary artery disease Brother    • Cirrhosis Brother    • Breast cancer Maternal Grandmother        Past Surgical History:  Past Surgical History:   Procedure Laterality Date   • APPENDECTOMY     • BREAST LUMPECTOMY Left    • CHOLECYSTECTOMY     • CYST REMOVAL  04/01/2022   • GASTRIC BYPASS     • ROTATOR CUFF REPAIR Right    • SHOULDER SURGERY Right Muscle tendon transfer    • WOUND DEBRIDEMENT N/A 02/23/2022    Procedure: EXCISIONAL DEBRIDEMENT OF BACK;  Surgeon: Bedelia Simmonds, MD;  Location: [facility];  Service: General       Social History:   reports that she has quit smoking  She has never used smokeless tobacco  She reports current alcohol use of about 14 0 standard drinks per week  She reports that she does not use drugs  Allergies:  Ciprofloxacin, Keflex [cephalexin], Penicillins, and Sulfa antibiotics      Current Outpatient Medications:   •  al mag oxide-diphenhydramine-lidocaine viscous (MAGIC MOUTHWASH) 1:1:1 suspension, Swish and spit 10 mL every 6 (six) hours as needed for mouth pain or discomfort, Disp: 180 mL, Rfl: 2  •  benzocaine (ORAJEL) 10 % mucosal gel, Apply 1 application to the mouth or throat as needed (apthous ulcer), Disp: 5 3 g, Rfl: 0  •  Biotin 1000 MCG tablet, Take 1,000 mcg by mouth daily, Disp: , Rfl:   •  buPROPion (Wellbutrin XL) 150 mg 24 hr tablet, Take 1 tablet (150 mg total) by mouth every morning, Disp: 30 tablet, Rfl: 3  •  Cholecalciferol 25 MCG (1000 UT) tablet, Take by mouth, Disp: , Rfl:   •  clonazePAM (KlonoPIN) 0 5 mg tablet, Take 0 5 mg by mouth daily as needed PRN, Disp: , Rfl:   •  Cyanocobalamin (VITAMIN B-12 PO), Take by mouth, Disp: , Rfl:   •  cyclobenzaprine (FLEXERIL) 5 mg tablet, Take 5 mg by mouth 3 (three) times a day as needed, Disp: , Rfl:   •  dexamethasone 0 5 MG/5ML elixir, TAKE 5 ML BY MOUTH 3 TIMES PER DAY FOR 10 DAYS, Disp: , Rfl:   •  Diclofenac Epolamine 1 3 % PTCH, 2 (two) times a day as needed  , Disp: , Rfl:   •  Diclofenac Sodium (VOLTAREN) 1 %, APPLY 1 APPLICATION TOPICALLY 4 (FOUR) TIMES A DAY   APPLY TO AFFECTED AREA , Disp: , Rfl:   •  fexofenadine (ALLEGRA) 180 MG tablet, Take 1 tablet (180 mg total) by mouth daily, Disp: 30 tablet, Rfl: 2  •  fluticasone (FLONASE) 50 mcg/act nasal spray, SPRAY 2 SPRAYS INTO EACH NOSTRIL EVERY DAY, Disp: 48 mL, Rfl: 1  •  folic acid (FOLVITE) 1 mg tablet, Take 1 tablet by mouth daily except on Wednesdays  , Disp: , Rfl:   •  gabapentin (NEURONTIN) 300 mg capsule, Take 900 mg by mouth daily Take 300 in AM and 600 in PM , Disp: , Rfl:   •  ibuprofen (MOTRIN) 800 mg tablet, Take 800 mg by mouth as needed PRN, Disp: , Rfl:   •  letrozole (FEMARA) 2 5 mg tablet, Take 2 5 mg by mouth daily, Disp: , Rfl:   •  levothyroxine 112 mcg tablet, Take 112 mcg by mouth in the morning, Disp: , Rfl:   •  magnesium oxide (MAG-OX) 400 mg tablet, Take by mouth 2 (two) times a day, Disp: , Rfl:   •  methotrexate 2 5 mg tablet, Takes 8 pills weekly on wednesday , Disp: , Rfl:   •  montelukast (SINGULAIR) 10 mg tablet, Take 10 mg by mouth, Disp: , Rfl:   •  nystatin-triamcinolone (MYCOLOG-II) cream, PRN, Disp: , Rfl:   •  omeprazole (PriLOSEC) 40 MG capsule, Take 1 capsule (40 mg total) by mouth daily, Disp: 90 capsule, Rfl: 2  •  SUMAtriptan (Imitrex) 25 mg tablet, 1 p o  at headache onset, may repeat 1 after 2 hours p r n , Disp: 9 tablet, Rfl: 5  •  venlafaxine (EFFEXOR-XR) 75 mg 24 hr capsule, Take 1 capsule by mouth daily, Disp: , Rfl:   •  Ventolin  (90 Base) MCG/ACT inhaler, INHALE 2 PUFFS EVERY 4 HOURS AS NEEDED FOR WHEEZING OR SHORTNESS OF BREATH, Disp: 18 g, Rfl: 2  •  predniSONE 10 mg tablet, TAKE 1 TABLET BY MOUTH EVERY DAY AS NEEDED FOR INFLIMMATION (Patient not taking: Reported on 11/3/2022), Disp: 30 tablet, Rfl: 0    I have reviewed the past medical, social and family history, current medications, allergies, vitals, review of systems and updated this information as appropriate today     Objective:    Vitals:  Blood pressure 140/74, pulse 82, height 5' 3" (1 6 m), weight 79 4 kg (175 lb), not currently breastfeeding      Physical Exam    Neurological Exam  GENERAL:  Well-developed well-nourished woman in no acute distress  HEENT/NECK: Head is atraumatic normocephalic, neck is supple  NEUROLOGIC:  Mental Status: Awake and alert without aphasia  Cranial Nerves: Extraocular movements are full  Face is symmetrical  Coordination:  Gait is stable            ROS:    Review of Systems   Constitutional: Negative  Negative for appetite change and fever  HENT: Negative  Negative for hearing loss, tinnitus, trouble swallowing and voice change  Eyes: Negative  Negative for photophobia, pain and visual disturbance  Respiratory: Negative  Negative for shortness of breath  Cardiovascular: Negative  Negative for palpitations  Gastrointestinal: Negative  Negative for nausea and vomiting  Endocrine: Negative  Negative for cold intolerance  Genitourinary: Negative  Negative for dysuria, frequency and urgency  Musculoskeletal: Negative  Negative for gait problem, myalgias and neck pain  Skin: Negative  Negative for rash  Allergic/Immunologic: Negative  Neurological: Negative  Negative for dizziness, tremors, seizures, syncope, facial asymmetry, speech difficulty, weakness, light-headedness, numbness and headaches  Patient states migraines have improved   Hematological: Negative  Does not bruise/bleed easily  Psychiatric/Behavioral: Negative  Negative for confusion, hallucinations and sleep disturbance

## 2022-12-19 ENCOUNTER — OFFICE VISIT (OUTPATIENT)
Dept: FAMILY MEDICINE CLINIC | Facility: CLINIC | Age: 62
End: 2022-12-19

## 2022-12-19 VITALS
HEIGHT: 63 IN | OXYGEN SATURATION: 98 % | WEIGHT: 174 LBS | DIASTOLIC BLOOD PRESSURE: 68 MMHG | BODY MASS INDEX: 30.83 KG/M2 | RESPIRATION RATE: 14 BRPM | HEART RATE: 90 BPM | SYSTOLIC BLOOD PRESSURE: 100 MMHG

## 2022-12-19 DIAGNOSIS — Z12.11 COLON CANCER SCREENING: ICD-10-CM

## 2022-12-19 DIAGNOSIS — Z00.00 ENCOUNTER FOR MEDICARE ANNUAL WELLNESS EXAM: Primary | ICD-10-CM

## 2022-12-19 DIAGNOSIS — M54.16 LUMBAR RADICULOPATHY: ICD-10-CM

## 2022-12-19 DIAGNOSIS — M54.50 ACUTE RIGHT-SIDED LOW BACK PAIN WITHOUT SCIATICA: ICD-10-CM

## 2022-12-19 DIAGNOSIS — M06.00 SERONEGATIVE RHEUMATOID ARTHRITIS (HCC): ICD-10-CM

## 2022-12-19 RX ORDER — PREDNISONE 10 MG/1
10 TABLET ORAL DAILY PRN
Qty: 30 TABLET | Refills: 0 | Status: SHIPPED | OUTPATIENT
Start: 2022-12-19

## 2022-12-19 NOTE — PROGRESS NOTES
Acute  Assessment and Plan:     Problem List Items Addressed This Visit    None  Visit Diagnoses     Encounter for Medicare annual wellness exam    -  Primary    Colon cancer screening        Relevant Orders    Cologuard    Acute right-sided low back pain without sciatica        Lumbar radiculopathy          Patient has Prednisone at home, will do a 5 day course  Discussed stretches and exercises  Advised to complete PT from rheumatologist  Patient to follow up if no improvement in back pain after steroids and home exercises  Preventive health issues were discussed with patient, and age appropriate screening tests were ordered as noted in patient's After Visit Summary  Personalized health advice and appropriate referrals for health education or preventive services given if needed, as noted in patient's After Visit Summary  History of Present Illness:     Patient presents for a Medicare Wellness Visit    HPI   Patient Care Team:  Nereyda Patten DO as PCP - General (Family Medicine)    Notes that she fell about 1 month ago and landed on [facility]  States that this has been improving but slowly  Pain on the right lower back, radiates around and down the leg  Has been using Voltaren gel with some improvement  Using Flexeril 5 mg with some improvement  Hot shower helps  She was given PT from her rheumatologist  She did not go yet        Review of Systems:     Review of Systems     Problem List:     Patient Active Problem List   Diagnosis   • Seronegative rheumatoid arthritis ([facility] [address] )   • Mixed hyperlipidemia   • Muscle pain, fibromyalgia   • IFG (impaired fasting glucose)   • Chronic obstructive pulmonary disease, unspecified COPD type (HCC)   • H/O gastric sleeve   • History of breast cancer in female   • Hypothyroidism due to Hashimoto's thyroiditis   • Meningioma [facility])      Past Medical and Surgical History:     Past Medical History:   Diagnosis Date   • Cancer [facility])     breast   • COPD (chronic obstructive pulmonary disease) ([facility] [address] )    • Disease of thyroid gland    • Emphysema lung (HCC)    • Fibromyalgia    • GERD (gastroesophageal reflux disease)    • Hashimoto's thyroiditis    • History of breast cancer    • RA (rheumatoid arthritis) (HCC)    • Rheumatoid arthritis involving multiple sites [facility])      Past Surgical History:   Procedure Laterality Date   • APPENDECTOMY     • BREAST LUMPECTOMY Left    • CHOLECYSTECTOMY     • CYST REMOVAL  04/01/2022   • GASTRIC BYPASS     • ROTATOR CUFF REPAIR Right    • SHOULDER SURGERY Right     Muscle tendon transfer    • WOUND DEBRIDEMENT N/A 02/23/2022    Procedure: EXCISIONAL DEBRIDEMENT OF BACK;  Surgeon: Tiffany Larkin MD;  Location: [facility];  Service: General      Family History:     Family History   Problem Relation Age of Onset   • Skin cancer Mother    • Alzheimer's disease Mother    • Cirrhosis Father    • Coronary artery disease Brother    • Cirrhosis Brother    • Breast cancer Maternal Grandmother       Social History:     Social History     Socioeconomic History   • Marital status: Unknown     Spouse name: None   • Number of children: None   • Years of education: None   • Highest education level: None   Occupational History   • None   Tobacco Use   • Smoking status: Former   • Smokeless tobacco: Never   Vaping Use   • Vaping Use: Never used   Substance and Sexual Activity   • Alcohol use: Yes     Alcohol/week: 14 0 standard drinks     Types: 14 Glasses of wine per week     Comment: a week   • Drug use: Never   • Sexual activity: Not Currently     Partners: Male   Other Topics Concern   • None   Social History Narrative   • None     Social Determinants of Health     Financial Resource Strain: Low Risk    • Difficulty of Paying Living Expenses: Not hard at all   Food Insecurity: Not on file   Transportation Needs: No Transportation Needs   • Lack of Transportation (Medical): No   • Lack of Transportation (Non-Medical):  No   Physical Activity: Not on file   Stress: Not on file   Social Connections: Not on file   Intimate Partner Violence: Not on file   Housing Stability: Not on file      Medications and Allergies:     Current Outpatient Medications   Medication Sig Dispense Refill   • Biotin 1000 MCG tablet Take 1,000 mcg by mouth daily     • buPROPion (Wellbutrin XL) 150 mg 24 hr tablet Take 1 tablet (150 mg total) by mouth every morning 30 tablet 3   • Cholecalciferol 25 MCG (1000 UT) tablet Take by mouth     • cyclobenzaprine (FLEXERIL) 5 mg tablet Take 5 mg by mouth 3 (three) times a day as needed     • dexamethasone 0 5 MG/5ML elixir TAKE 5 ML BY MOUTH 3 TIMES PER DAY FOR 10 DAYS     • Diclofenac Epolamine 1 3 % PTCH 2 (two) times a day as needed       • Diclofenac Sodium (VOLTAREN) 1 % APPLY 1 APPLICATION TOPICALLY 4 (FOUR) TIMES A DAY  APPLY TO AFFECTED AREA  • fexofenadine (ALLEGRA) 180 MG tablet Take 1 tablet (180 mg total) by mouth daily 30 tablet 2   • fluticasone (FLONASE) 50 mcg/act nasal spray SPRAY 2 SPRAYS INTO EACH NOSTRIL EVERY DAY 48 mL 1   • folic acid (FOLVITE) 1 mg tablet Take 1 tablet by mouth daily except on Wednesdays        • gabapentin (NEURONTIN) 300 mg capsule Take 900 mg by mouth daily Take 300 in AM and 600 in PM      • ibuprofen (MOTRIN) 800 mg tablet Take 800 mg by mouth as needed PRN     • letrozole (FEMARA) 2 5 mg tablet Take 2 5 mg by mouth daily     • levothyroxine 112 mcg tablet Take 112 mcg by mouth in the morning     • methotrexate 2 5 mg tablet Takes 8 pills weekly on wednesday      • montelukast (SINGULAIR) 10 mg tablet Take 10 mg by mouth     • nystatin-triamcinolone (MYCOLOG-II) cream PRN     • omeprazole (PriLOSEC) 40 MG capsule Take 1 capsule (40 mg total) by mouth daily 90 capsule 2   • SUMAtriptan (Imitrex) 25 mg tablet 1 p o  at headache onset, may repeat 1 after 2 hours p r n  9 tablet 5   • venlafaxine (EFFEXOR-XR) 75 mg 24 hr capsule Take 1 capsule by mouth daily     • Ventolin  (90 Base) MCG/ACT inhaler INHALE 2 PUFFS EVERY 4 HOURS AS NEEDED FOR WHEEZING OR SHORTNESS OF BREATH 18 g 2   • al mag oxide-diphenhydramine-lidocaine viscous (MAGIC MOUTHWASH) 1:1:1 suspension Swish and spit 10 mL every 6 (six) hours as needed for mouth pain or discomfort (Patient not taking: Reported on 12/19/2022) 180 mL 2   • benzocaine (ORAJEL) 10 % mucosal gel Apply 1 application to the mouth or throat as needed (apthous ulcer) (Patient not taking: Reported on 12/19/2022) 5 3 g 0   • clonazePAM (KlonoPIN) 0 5 mg tablet Take 0 5 mg by mouth daily as needed PRN (Patient not taking: Reported on 12/19/2022)     • Cyanocobalamin (VITAMIN B-12 PO) Take by mouth (Patient not taking: Reported on 12/19/2022)     • magnesium oxide (MAG-OX) 400 mg tablet Take by mouth 2 (two) times a day (Patient not taking: Reported on 12/19/2022)     • predniSONE 10 mg tablet TAKE 1 TABLET BY MOUTH EVERY DAY AS NEEDED FOR INFLIMMATION (Patient not taking: Reported on 11/3/2022) 30 tablet 0     No current facility-administered medications for this visit       Allergies   Allergen Reactions   • Ciprofloxacin Hives   • Keflex [Cephalexin] Hives   • Penicillins Hives   • Sulfa Antibiotics Hives      Immunizations:     Immunization History   Administered Date(s) Administered   • COVID-19 PFIZER VACCINE 0 3 ML IM 03/19/2021, 04/09/2021, 11/08/2021   • INFLUENZA 10/15/2018, 10/23/2021   • Influenza Quadrivalent 3 years and older 10/15/2018   • Influenza Quadrivalent Preservative Free 3 years and older IM 09/23/2019, 10/01/2020   • Influenza, recombinant, quadrivalent,injectable, preservative free 11/03/2022   • Pneumococcal Conjugate 13-Valent 08/31/2020   • Pneumococcal Polysaccharide PPV23 05/28/2019   • Tdap 05/28/2019      Health Maintenance:         Topic Date Due   • Hepatitis C Screening  Never done   • HIV Screening  Never done   • Colorectal Cancer Screening  01/05/2022   • Breast Cancer Screening: Mammogram  06/23/2022   • Cervical Cancer Screening  03/22/2025 Topic Date Due   • Hepatitis B Vaccine (1 of 3 - 3-dose series) Never done   • COVID-19 Vaccine (4 - Booster for Pfizer series) 01/31/2022      Medicare Screening Tests and Risk Assessments:     Romona Cooks is here for her Subsequent Wellness visit  Last Medicare Wellness visit information reviewed, patient interviewed, no change since last AWV  Health Risk Assessment:   Patient rates overall health as good  Patient feels that their physical health rating is slightly worse  Patient is satisfied with their life  Eyesight was rated as same  Hearing was rated as same  Patient feels that their emotional and mental health rating is slightly worse  Patients states they are never, rarely angry  Patient states they are sometimes unusually tired/fatigued  Pain experienced in the last 7 days has been a lot  Patient's pain rating has been 8/10  Patient states that she has experienced no weight loss or gain in last 6 months  Depression Screening:   PHQ-9 Score: 2      Fall Risk Screening: In the past year, patient has experienced: history of falling in past year    Number of falls: 2 or more  Injured during fall?: Yes    Feels unsteady when standing or walking?: Yes    Worried about falling?: Yes      Urinary Incontinence Screening:   Patient has not leaked urine accidently in the last six months  Home Safety:  Patient does not have trouble with stairs inside or outside of their home  Patient has working smoke alarms and has working carbon monoxide detector  Home safety hazards include: household clutter  Nutrition:   Current diet is Regular and Unhealthy  Medications:   Patient is not currently taking any over-the-counter supplements  Patient is able to manage medications  Activities of Daily Living (ADLs)/Instrumental Activities of Daily Living (IADLs):   Walk and transfer into and out of bed and chair?: Yes  Dress and groom yourself?: Yes    Bathe or shower yourself?: Yes    Feed yourself?  Yes  Do your laundry/housekeeping?: Yes  Manage your money, pay your bills and track your expenses?: Yes  Make your own meals?: Yes    Do your own shopping?: Yes    Previous Hospitalizations:   Any hospitalizations or ED visits within the last 12 months?: No      Advance Care Planning:   Living will: No    Durable POA for healthcare: No    Advanced directive: No    Advanced directive counseling given: Yes    Five wishes given: Yes      Cognitive Screening:   Provider or family/friend/caregiver concerned regarding cognition?: No    PREVENTIVE SCREENINGS      Cardiovascular Screening:    General: Screening Not Indicated and History Lipid Disorder      Diabetes Screening:     General: Screening Current      Colorectal Cancer Screening:     General: Screening Current      Breast Cancer Screening:     General: History Breast Cancer and Screening Current      Cervical Cancer Screening:    General: Screening Current      Osteoporosis Screening:    General: Screening Current      Abdominal Aortic Aneurysm (AAA) Screening:        General: Screening Not Indicated      Lung Cancer Screening:     General: Screening Not Indicated    Other Counseling Topics:   Car/seat belt/driving safety, sunscreen and calcium and vitamin D intake and regular weightbearing exercise  No results found  Physical Exam:     /68 (BP Location: Right arm, Patient Position: Sitting, Cuff Size: Large)   Pulse 90   Resp 14   Ht 5' 3" (1 6 m)   Wt 78 9 kg (174 lb)   SpO2 98%   BMI 30 82 kg/m²     Physical Exam  Vitals reviewed  Constitutional:       General: She is not in acute distress  Appearance: Normal appearance  HENT:      Head: Normocephalic and atraumatic  Right Ear: External ear normal       Left Ear: External ear normal       Nose: Nose normal       Mouth/Throat:      Mouth: Mucous membranes are moist    Eyes:      Extraocular Movements: Extraocular movements intact        Conjunctiva/sclera: Conjunctivae normal  Pupils: Pupils are equal, round, and reactive to light  Cardiovascular:      Rate and Rhythm: Normal rate and regular rhythm  Heart sounds: Normal heart sounds  Pulmonary:      Effort: Pulmonary effort is normal       Breath sounds: Normal breath sounds  Abdominal:      General: Bowel sounds are normal  There is no distension  Palpations: Abdomen is soft  Tenderness: There is no abdominal tenderness  Musculoskeletal:      Cervical back: Neck supple  Lumbar back: Tenderness (right side paraspinal muscles) present  No bony tenderness  Positive right straight leg raise test  Negative left straight leg raise test       Right lower leg: No edema  Left lower leg: No edema  Lymphadenopathy:      Cervical: No cervical adenopathy  Skin:     General: Skin is warm  Capillary Refill: Capillary refill takes less than 2 seconds  Findings: No rash  Neurological:      Mental Status: She is alert  Mental status is at baseline           Ria DO Bailey

## 2022-12-19 NOTE — TELEPHONE ENCOUNTER
Pt stopped by the office  Saw Celestino Del Cid earlier and wasn't sure how many pills of prednisone she has left  Pt realized she only has 3 pills left and is leaving on the 21st to [place]  Pt is requesting a refill of prednisone  [facility] team update current med list - pt reported on 11/03/2022 not taking  Please approve or refuse refill

## 2023-01-16 DIAGNOSIS — J44.9 CHRONIC OBSTRUCTIVE PULMONARY DISEASE, UNSPECIFIED COPD TYPE (HCC): ICD-10-CM

## 2023-01-18 ENCOUNTER — TELEPHONE (OUTPATIENT)
Dept: FAMILY MEDICINE CLINIC | Facility: CLINIC | Age: 63
End: 2023-01-18

## 2023-01-19 ENCOUNTER — PATIENT MESSAGE (OUTPATIENT)
Dept: FAMILY MEDICINE CLINIC | Facility: CLINIC | Age: 63
End: 2023-01-19

## 2023-01-19 DIAGNOSIS — Z12.11 COLON CANCER SCREENING: Primary | ICD-10-CM

## 2023-01-31 DIAGNOSIS — Z91.09 ENVIRONMENTAL ALLERGIES: ICD-10-CM

## 2023-01-31 RX ORDER — FEXOFENADINE HCL 180 MG/1
TABLET ORAL
Qty: 30 TABLET | Refills: 2 | Status: SHIPPED | OUTPATIENT
Start: 2023-01-31

## 2023-02-08 DIAGNOSIS — J44.9 CHRONIC OBSTRUCTIVE PULMONARY DISEASE, UNSPECIFIED COPD TYPE (HCC): ICD-10-CM

## 2023-02-08 RX ORDER — ALBUTEROL SULFATE 90 UG/1
2 AEROSOL, METERED RESPIRATORY (INHALATION) EVERY 4 HOURS PRN
Qty: 18 G | Refills: 1 | Status: SHIPPED | OUTPATIENT
Start: 2023-02-08 | End: 2023-05-09

## 2023-02-10 LAB — COLOGUARD RESULT REPORTABLE: NEGATIVE

## 2023-02-14 ENCOUNTER — OFFICE VISIT (OUTPATIENT)
Dept: FAMILY MEDICINE CLINIC | Facility: CLINIC | Age: 63
End: 2023-02-14

## 2023-02-14 VITALS
BODY MASS INDEX: 31.54 KG/M2 | HEIGHT: 63 IN | TEMPERATURE: 98.6 F | DIASTOLIC BLOOD PRESSURE: 62 MMHG | SYSTOLIC BLOOD PRESSURE: 116 MMHG | OXYGEN SATURATION: 99 % | HEART RATE: 86 BPM | WEIGHT: 178 LBS

## 2023-02-14 DIAGNOSIS — M06.00 SERONEGATIVE RHEUMATOID ARTHRITIS (HCC): ICD-10-CM

## 2023-02-14 DIAGNOSIS — M54.50 CHRONIC RIGHT-SIDED LOW BACK PAIN WITHOUT SCIATICA: ICD-10-CM

## 2023-02-14 DIAGNOSIS — R14.0 ABDOMINAL BLOATING: ICD-10-CM

## 2023-02-14 DIAGNOSIS — G89.29 CHRONIC RIGHT-SIDED LOW BACK PAIN WITHOUT SCIATICA: ICD-10-CM

## 2023-02-14 DIAGNOSIS — D32.9 MENINGIOMA (HCC): ICD-10-CM

## 2023-02-14 DIAGNOSIS — Z90.3 H/O GASTRIC SLEEVE: ICD-10-CM

## 2023-02-14 DIAGNOSIS — J44.9 CHRONIC OBSTRUCTIVE PULMONARY DISEASE, UNSPECIFIED COPD TYPE (HCC): ICD-10-CM
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
## 2023-02-14 RX ORDER — LIDOCAINE AND PRILOCAINE 25; 25 MG/G; MG/G
CREAM TOPICAL
COMMUNITY
Start: 2023-01-09

## 2023-02-14 NOTE — PROGRESS NOTES
Name: Aisha Marino      : 1960      MRN: [id number]  Encounter Provider: Gutierrez Gates DO  Encounter Date: 2023   Encounter department: [place] [address] [address] [place] [address]     1  Fall, sequela  -     XR spine lumbar 2 or 3 views injury; Future; Expected date: 2023    2  Chronic right-sided low back pain without sciatica  -     XR spine lumbar 2 or 3 views injury; Future; Expected date: 2023    3  Abdominal bloating  -     Ambulatory Referral to Bariatric Surgery; Future    4  H/O gastric sleeve  -     Ambulatory Referral to Bariatric Surgery; Future    5  Seronegative rheumatoid arthritis ([facility] [address] )  Stable, following with rheumatology  6  Chronic obstructive pulmonary disease, unspecified COPD type ([facility] [address] )  Stable, continue with inhalers  7  Meningioma (HCC)  Stable  Following with neurology  Patient has not had imaging after fall, does have some bony tenderness of the TPs of L3-L5 on the right  Will check XR, especially in the setting of chronic steroid use  Advised patient to follow up with pain management since PT has not been helpful and has been      Subjective      HPI     Patient presents to the office for follow up  States that she has been having some back pain since her fall in November  Reports that he pain travels from the right side of her back around into the right groin  Has been taking 800 mg Ibuprofen (BID) and flexeril 10 mg without improvement  Notes that she is doing physical therapy, reports that this aggravates her back pain  She has not seen Dr Teresa Lopez for this, follows with her for pain management  Notes that she plans to stop PT because this is aggravating it too much  No imaging since the fall  Patient is going to call Dr Bryan Noland office  Patient reports abdominal bloating  This has been going on for about 2 weeks   Notes that she has had some abdominal pain in the upper aspect, feels extremely full, even after small amounts of food  Notes changes in her BMs recently, notes that that consistency is the same, but less frequent  Denies blood in the stool  Denies dark/tarry stools  Notes that she has been more gassy  States that her appetite is a little less due to feeling full  Denies any significant weight changes  Denies heartburn  Taking her PPI  Denies N/V  Tries to take Ibuprofen with food  She is not taking her steroid currently  Denies trouble with food swallowing or feeling like it is getting stuck  Denies fever or chills  Denies night sweats or weight loss  H/o of gastric sleeve in 2012, does not follow with bariatrics  Notes that she has been stressed lately, stomach does seem worse when she is anxious  Review of Systems    Current Outpatient Medications on File Prior to Visit   Medication Sig   • albuterol (Ventolin HFA) 90 mcg/act inhaler Inhale 2 puffs every 4 (four) hours as needed for wheezing or shortness of breath   • Biotin 1000 MCG tablet Take 1,000 mcg by mouth daily   • buPROPion (Wellbutrin XL) 150 mg 24 hr tablet Take 1 tablet (150 mg total) by mouth every morning   • Cholecalciferol 25 MCG (1000 UT) tablet Take by mouth   • cyclobenzaprine (FLEXERIL) 5 mg tablet Take 5 mg by mouth 3 (three) times a day as needed   • Diclofenac Epolamine 1 3 % PTCH 2 (two) times a day as needed     • Diclofenac Sodium (VOLTAREN) 1 % APPLY 1 APPLICATION TOPICALLY 4 (FOUR) TIMES A DAY  APPLY TO AFFECTED AREA  • fexofenadine (ALLEGRA) 180 MG tablet TAKE 1 TABLET BY MOUTH EVERY DAY   • fluticasone (FLONASE) 50 mcg/act nasal spray SPRAY 2 SPRAYS INTO EACH NOSTRIL EVERY DAY   • folic acid (FOLVITE) 1 mg tablet Take 1 tablet by mouth daily except on Wednesdays      • gabapentin (NEURONTIN) 300 mg capsule Take 900 mg by mouth daily Take 300 in AM and 600 in PM    • ibuprofen (MOTRIN) 800 mg tablet Take 800 mg by mouth as needed PRN   • levothyroxine 112 mcg tablet Take 112 mcg by mouth in the morning • lidocaine-prilocaine (EMLA) cream APPLY TOPICALLY AS NEEDED FOR MILD PAIN (PAIN SCORE 1-3)  • methotrexate 2 5 mg tablet Takes 8 pills weekly on wednesday    • nystatin-triamcinolone (MYCOLOG-II) cream PRN   • omeprazole (PriLOSEC) 40 MG capsule Take 1 capsule (40 mg total) by mouth daily   • SUMAtriptan (Imitrex) 25 mg tablet 1 p o  at headache onset, may repeat 1 after 2 hours p r n  • predniSONE 10 mg tablet Take 1 tablet (10 mg total) by mouth daily as needed (inflammation) (Patient not taking: Reported on 2/14/2023)   • [DISCONTINUED] al mag oxide-diphenhydramine-lidocaine viscous (MAGIC MOUTHWASH) 1:1:1 suspension Swish and spit 10 mL every 6 (six) hours as needed for mouth pain or discomfort (Patient not taking: Reported on 12/19/2022)   • [DISCONTINUED] benzocaine (ORAJEL) 10 % mucosal gel Apply 1 application to the mouth or throat as needed (apthous ulcer) (Patient not taking: Reported on 12/19/2022)   • [DISCONTINUED] clonazePAM (KlonoPIN) 0 5 mg tablet Take 0 5 mg by mouth daily as needed PRN (Patient not taking: Reported on 12/19/2022)   • [DISCONTINUED] Cyanocobalamin (VITAMIN B-12 PO) Take by mouth (Patient not taking: Reported on 12/19/2022)   • [DISCONTINUED] dexamethasone 0 5 MG/5ML elixir TAKE 5 ML BY MOUTH 3 TIMES PER DAY FOR 10 DAYS   • [DISCONTINUED] letrozole (FEMARA) 2 5 mg tablet Take 2 5 mg by mouth daily   • [DISCONTINUED] magnesium oxide (MAG-OX) 400 mg tablet Take by mouth 2 (two) times a day (Patient not taking: Reported on 12/19/2022)   • [DISCONTINUED] montelukast (SINGULAIR) 10 mg tablet Take 10 mg by mouth   • [DISCONTINUED] venlafaxine (EFFEXOR-XR) 75 mg 24 hr capsule Take 1 capsule by mouth daily     Objective     /62   Pulse 86   Temp 98 6 °F (37 °C)   Ht 5' 3" (1 6 m)   Wt 80 7 kg (178 lb)   SpO2 99%   BMI 31 53 kg/m²     Physical Exam  Vitals reviewed  Constitutional:       General: She is not in acute distress  Appearance: Normal appearance     HENT: Head: Normocephalic and atraumatic  Right Ear: External ear normal       Left Ear: External ear normal       Nose: Nose normal       Mouth/Throat:      Mouth: Mucous membranes are moist    Eyes:      Extraocular Movements: Extraocular movements intact  Conjunctiva/sclera: Conjunctivae normal       Pupils: Pupils are equal, round, and reactive to light  Cardiovascular:      Rate and Rhythm: Normal rate and regular rhythm  Heart sounds: Normal heart sounds  Pulmonary:      Effort: Pulmonary effort is normal       Breath sounds: Normal breath sounds  No wheezing, rhonchi or rales  Abdominal:      General: Bowel sounds are normal  There is no distension  Palpations: Abdomen is soft  Tenderness: There is no abdominal tenderness  Musculoskeletal:         General: Tenderness (L3-L5 TP in the right) present  Cervical back: Neck supple  Right lower leg: No edema  Left lower leg: No edema  Lymphadenopathy:      Cervical: No cervical adenopathy  Skin:     General: Skin is warm  Capillary Refill: Capillary refill takes less than 2 seconds  Findings: No rash  Neurological:      Mental Status: She is alert  Mental status is at baseline          Balaji Burnett DO

## 2023-02-20 ENCOUNTER — TELEPHONE (OUTPATIENT)
Dept: NEUROLOGY | Facility: CLINIC | Age: 63
End: 2023-02-20

## 2023-02-23 ENCOUNTER — TELEPHONE (OUTPATIENT)
Dept: BARIATRICS | Facility: CLINIC | Age: 63
End: 2023-02-23

## 2023-02-23 ENCOUNTER — HOSPITAL ENCOUNTER (OUTPATIENT)
Dept: RADIOLOGY | Facility: HOSPITAL | Age: 63
End: 2023-02-23

## 2023-02-23 DIAGNOSIS — M54.50 CHRONIC RIGHT-SIDED LOW BACK PAIN WITHOUT SCIATICA: ICD-10-CM
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
## 2023-02-23 DIAGNOSIS — G89.29 CHRONIC RIGHT-SIDED LOW BACK PAIN WITHOUT SCIATICA: ICD-10-CM

## 2023-02-23 NOTE — PROGRESS NOTES
Pain Medicine Follow-Up Note    Assessment:  1  Lumbar facet arthropathy    2  Chronic bilateral low back pain, unspecified whether sciatica present        Plan:  Orders Placed This Encounter   Procedures   • FL spine and pain procedure     Standing Status:   Future     Standing Expiration Date:   2/24/2027     Order Specific Question:   Reason for Exam:     Answer:   RIGHT L4-5 and L5-S1 MBB #1     Order Specific Question:   Anticoagulant hold needed? 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  Her x-ray demonstrates notable disc space narrowing in the lower lumbar spine as well as facet disease  She has notable positive facet loading, right greater than left  It appears she has aggravated the facet joints  We discussed right L4-5 and L5-S1 medial branch block with possible radiofrequency ablation of greater than 80% relief  [place] Prescription Drug Monitoring Program report was reviewed and was appropriate     Complete risks and benefits including bleeding, infection, tissue reaction, nerve injury and allergic reaction were discussed  The approach was demonstrated using models and literature was provided  Verbal and written consent was obtained  Discharge instructions were provided  I personally saw and examined the patient and I agree with the above discussed plan of care      History of Present Illness:    Laura Camp is a 65 y o  female who presents to [de-identified]  [facility] for interval re-evaluation of the above stated pain complaints  The patient has a past medical and chronic pain history as outlined in the assessment section  She was last seen on 07/07/22 for left L4 and L5 TFESI  sHE IS HERE W/ new complaint of bilateral low back pain right greater than left  It radiates into the right anterior thigh  7/10, worse at night  Shooting, sharp in nature  This began after a fall in Nov 2022 where she tripped  Since last viist patient reports multiple falls  Other than as stated above, the patient denies any interval changes in medications, medical condition, mental condition, symptoms, or allergies since the last office visit  Review of Systems:    Review of Systems   Respiratory: Negative for shortness of breath  Cardiovascular: Negative for chest pain  Gastrointestinal: Negative for constipation, diarrhea, nausea and vomiting  Musculoskeletal: Positive for gait problem  Negative for arthralgias, joint swelling and myalgias  Pain in lower back around the hip/groin area and front of thigh   Skin: Negative for rash  Neurological: Negative for dizziness, seizures and weakness  All other systems reviewed and are negative          Past Medical History:   Diagnosis Date   • Cancer [facility])     breast   • COPD (chronic obstructive pulmonary disease) ([facility])    • Disease of thyroid gland    • Emphysema lung (HCC)    • Fibromyalgia    • GERD (gastroesophageal reflux disease)    • Hashimoto's thyroiditis    • History of breast cancer    • [facility]    • Rheumatoid arthritis involving multiple sites [facility])        Past Surgical History:   Procedure Laterality Date   • APPENDECTOMY     • BREAST LUMPECTOMY Left    • CHOLECYSTECTOMY     • CYST REMOVAL  04/01/2022   • GASTRIC BYPASS     • ROTATOR CUFF REPAIR Right    • SHOULDER SURGERY Right     Muscle tendon transfer    • WOUND DEBRIDEMENT N/A 02/23/2022    Procedure: EXCISIONAL DEBRIDEMENT OF BACK;  Surgeon: Joaquina Bradley MD;  Location: [facility];  Service: General       Family History   Problem Relation Age of Onset   • Skin cancer Mother    • Alzheimer's disease Mother    • Cirrhosis Father    • Coronary artery disease Brother    • Cirrhosis Brother    • Breast cancer Maternal Grandmother        Social History     Occupational History   • Not on file   Tobacco Use   • Smoking status: Former   • Smokeless tobacco: Never   Vaping Use   • Vaping Use: Never used   Substance and Sexual Activity   • Alcohol use: Yes     Alcohol/week: 14 0 standard drinks     Types: 14 Glasses of wine per week     Comment: a week   • Drug use: Never   • Sexual activity: Not Currently     Partners: Male         Current Outpatient Medications:   •  albuterol (Ventolin HFA) 90 mcg/act inhaler, Inhale 2 puffs every 4 (four) hours as needed for wheezing or shortness of breath, Disp: 18 g, Rfl: 1  •  Biotin 1000 MCG tablet, Take 1,000 mcg by mouth daily, Disp: , Rfl:   •  buPROPion (Wellbutrin XL) 150 mg 24 hr tablet, Take 1 tablet (150 mg total) by mouth every morning, Disp: 30 tablet, Rfl: 3  •  Cholecalciferol 25 MCG (1000 UT) tablet, Take by mouth, Disp: , Rfl:   •  cyclobenzaprine (FLEXERIL) 5 mg tablet, Take 5 mg by mouth 3 (three) times a day as needed, Disp: , Rfl:   •  Diclofenac Epolamine 1 3 % PTCH, 2 (two) times a day as needed  , Disp: , Rfl:   •  Diclofenac Sodium (VOLTAREN) 1 %, APPLY 1 APPLICATION TOPICALLY 4 (FOUR) TIMES A DAY  APPLY TO AFFECTED AREA , Disp: , Rfl:   •  fexofenadine (ALLEGRA) 180 MG tablet, TAKE 1 TABLET BY MOUTH EVERY DAY, Disp: 30 tablet, Rfl: 2  •  fluticasone (FLONASE) 50 mcg/act nasal spray, SPRAY 2 SPRAYS INTO EACH NOSTRIL EVERY DAY, Disp: 48 mL, Rfl: 1  •  folic acid (FOLVITE) 1 mg tablet, Take 1 tablet by mouth daily except on Wednesdays   , Disp: , Rfl:   •  gabapentin (NEURONTIN) 300 mg capsule, Take 900 mg by mouth daily Take 300 in AM and 600 in PM , Disp: , Rfl:   •  ibuprofen (MOTRIN) 800 mg tablet, Take 800 mg by mouth as needed PRN, Disp: , Rfl:   •  levothyroxine 112 mcg tablet, Take 112 mcg by mouth in the morning, Disp: , Rfl:   •  lidocaine-prilocaine (EMLA) cream, APPLY TOPICALLY AS NEEDED FOR MILD PAIN (PAIN SCORE 1-3)  , Disp: , Rfl:   •  methotrexate 2 5 mg tablet, Takes 8 pills weekly on wednesday , Disp: , Rfl:   •  nystatin-triamcinolone (MYCOLOG-II) cream, PRN, Disp: , Rfl:   •  omeprazole (PriLOSEC) 40 MG capsule, Take 1 capsule (40 mg total) by mouth daily, Disp: 90 capsule, Rfl: 2  •  SUMAtriptan (Imitrex) 25 mg tablet, 1 p o  at headache onset, may repeat 1 after 2 hours p r n , Disp: 9 tablet, Rfl: 5  •  predniSONE 10 mg tablet, Take 1 tablet (10 mg total) by mouth daily as needed (inflammation) (Patient not taking: Reported on 2/14/2023), Disp: 30 tablet, Rfl: 0    Allergies   Allergen Reactions   • Ciprofloxacin Hives   • Keflex [Cephalexin] Hives   • Penicillins Hives   • Sulfa Antibiotics Hives       Physical Exam:    /79 (BP Location: Left arm, Patient Position: Sitting, Cuff Size: Standard)   Pulse 74   Ht 5' 3" (1 6 m)   Wt 80 kg (176 lb 6 4 oz)   BMI 31 25 kg/m²     Constitutional:normal, well developed, well nourished, alert, in no distress and non-toxic and no overt pain behavior  Eyes:anicteric  HEENT:grossly intact  Neck:supple, symmetric, trachea midline and no masses   Pulmonary:even and unlabored  Cardiovascular:No edema or pitting edema present  Skin:Normal without rashes or lesions and well hydrated  Psychiatric:Mood and affect appropriate  Neurologic:Cranial Nerves II-XII grossly intact  Musculoskeletal: Tender to palpation the bilateral SI joints and right lumbar paraspinal region  There is notable facet loading right far greater than left  There is also pain with lumbar extension  Less pain with forward flexion      Right hip examination reveals right groin pain with logrolling internally and external rotation during Veena maneuver  Stinchfield test did not produce pain in the right groin            Imaging  FL spine and pain procedure    (Results Pending)         Orders Placed This Encounter   Procedures   • FL spine and pain procedure

## 2023-02-23 NOTE — TELEPHONE ENCOUNTER
Spoken to patient is a tranfers pt from [facility]  Patient is file on Letter M  Patient is aware she need to bring pcp records

## 2023-02-24 ENCOUNTER — OFFICE VISIT (OUTPATIENT)
Dept: PAIN MEDICINE | Facility: CLINIC | Age: 63
End: 2023-02-24

## 2023-02-24 VITALS
SYSTOLIC BLOOD PRESSURE: 131 MMHG | HEART RATE: 74 BPM | WEIGHT: 176.4 LBS | BODY MASS INDEX: 31.25 KG/M2 | DIASTOLIC BLOOD PRESSURE: 79 MMHG | HEIGHT: 63 IN

## 2023-02-24 DIAGNOSIS — G89.29 CHRONIC BILATERAL LOW BACK PAIN, UNSPECIFIED WHETHER SCIATICA PRESENT: ICD-10-CM

## 2023-02-24 DIAGNOSIS — M47.816 LUMBAR FACET ARTHROPATHY: Primary | ICD-10-CM

## 2023-02-24 DIAGNOSIS — M54.50 CHRONIC BILATERAL LOW BACK PAIN, UNSPECIFIED WHETHER SCIATICA PRESENT: ICD-10-CM

## 2023-03-13 ENCOUNTER — TELEPHONE (OUTPATIENT)
Dept: RADIOLOGY | Facility: CLINIC | Age: 63
End: 2023-03-13

## 2023-03-13 NOTE — TELEPHONE ENCOUNTER
----- Message from Ozzy Ndiaye, [address] sent at 4/49/8537  1:28 PM EDT -----  Patient is scheduled for MBB on 4/11 does she need to stop Gapabetin for 6 hrs?   Please advise

## 2023-03-20 ENCOUNTER — TELEPHONE (OUTPATIENT)
Dept: ADMINISTRATIVE | Facility: OTHER | Age: 63
End: 2023-03-20

## 2023-03-20 ENCOUNTER — OFFICE VISIT (OUTPATIENT)
Dept: FAMILY MEDICINE CLINIC | Facility: CLINIC | Age: 63
End: 2023-03-20

## 2023-03-20 VITALS
DIASTOLIC BLOOD PRESSURE: 74 MMHG | SYSTOLIC BLOOD PRESSURE: 114 MMHG | BODY MASS INDEX: 30.65 KG/M2 | TEMPERATURE: 98 F | WEIGHT: 173 LBS | HEIGHT: 63 IN

## 2023-03-20 DIAGNOSIS — M06.00 SERONEGATIVE RHEUMATOID ARTHRITIS (HCC): Primary | ICD-10-CM

## 2023-03-20 DIAGNOSIS — J44.9 CHRONIC OBSTRUCTIVE PULMONARY DISEASE, UNSPECIFIED COPD TYPE (HCC): ICD-10-CM

## 2023-03-20 DIAGNOSIS — G89.29 CHRONIC RIGHT-SIDED LOW BACK PAIN WITHOUT SCIATICA: ICD-10-CM

## 2023-03-20 DIAGNOSIS — M54.50 CHRONIC RIGHT-SIDED LOW BACK PAIN WITHOUT SCIATICA: ICD-10-CM

## 2023-03-20 RX ORDER — MONTELUKAST SODIUM 10 MG/1
10 TABLET ORAL DAILY
COMMUNITY
Start: 2023-02-20

## 2023-03-20 NOTE — TELEPHONE ENCOUNTER
----- Message from Jessica Larios sent at 3/20/2023  9:57 AM EDT -----  Regarding: Care gap request  03/20/23 9:57 AM    Hello, our patient Tamir Torres has had Mammogram completed/performed  Please assist in updating the patient chart by pulling the Care Everywhere (CE) document  The date of service is 06/2022       Thank you,  Jessica Jaffe [phone]

## 2023-03-20 NOTE — TELEPHONE ENCOUNTER
----- Message from Aileen Ruiz sent at 3/20/2023  9:57 AM EDT -----  Regarding: Care gap request  03/20/23 9:57 AM    Hello, our patient Rosi Garcia has had Mammogram completed/performed  Please assist in updating the patient chart by pulling the Care Everywhere (CE) document  The date of service is 06/2022       Thank you,  Aileen Jaffe [phone]

## 2023-03-20 NOTE — PROGRESS NOTES
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 ([facility] )  Stable, continue with current management  2  Chronic obstructive pulmonary disease, unspecified COPD type ([facility] )  Stable, continue with albuterol PRN  3  BMI 30 0-30 9,adult  She is working on weight loss, she is down about 5 lbs in the last 1 month  4  Chronic right-sided low back pain without sciatica  Doing well, following with Dr Medhat Rice with plan for [facility] soon  Subjective      HPI     Patient presents to the office for follow up  Notes that her mental health is going well  States that she is no longer working things out with her , states that she is looking to move on  Working on focusing on herself  She did not complete her labs  She is not fasting so she states that she will get this done next week  PHQ-2/9 Depression Screening    Little interest or pleasure in doing things: 0 - not at all  Feeling down, depressed, or hopeless: 0 - not at all  PHQ-2 Score: 0  PHQ-2 Interpretation: Negative depression screen       KARTIK-7 Flowsheet Screening    Flowsheet Row Most Recent Value   Over the last 2 weeks, how often have you been bothered by any of the following problems? Feeling nervous, anxious, or on edge 1   Not being able to stop or control worrying 1   Worrying too much about different things 1   Trouble relaxing 0   Being so restless that it is hard to sit still 0   Becoming easily annoyed or irritable 0   Feeling afraid as if something awful might happen 0   KARTIK-7 Total Score 3        Reports that she has her mammogram scheduled in 2023  Last done 2022  States that she recently had her check up with her breast surgeon       Reports that she has some lipomas in her arms and across her upper abdomen  States that she thinks that she has one in her neck on the left side  States that this changes in size, bigger and smaller, has been present for about 3 years  Tender at times  Has seen Dr Lali Flanagan, is having nerve block done soon  Review of Systems    Current Outpatient Medications on File Prior to Visit   Medication Sig   • albuterol (Ventolin HFA) 90 mcg/act inhaler Inhale 2 puffs every 4 (four) hours as needed for wheezing or shortness of breath   • Biotin 1000 MCG tablet Take 1,000 mcg by mouth daily   • buPROPion (Wellbutrin XL) 150 mg 24 hr tablet Take 1 tablet (150 mg total) by mouth every morning   • Cholecalciferol 25 MCG (1000 UT) tablet Take by mouth   • cyclobenzaprine (FLEXERIL) 5 mg tablet Take 5 mg by mouth 3 (three) times a day as needed   • Diclofenac Epolamine 1 3 % PTCH 2 (two) times a day as needed     • Diclofenac Sodium (VOLTAREN) 1 % APPLY 1 APPLICATION TOPICALLY 4 (FOUR) TIMES A DAY  APPLY TO AFFECTED AREA  • fexofenadine (ALLEGRA) 180 MG tablet TAKE 1 TABLET BY MOUTH EVERY DAY   • fluticasone (FLONASE) 50 mcg/act nasal spray SPRAY 2 SPRAYS INTO EACH NOSTRIL EVERY DAY   • folic acid (FOLVITE) 1 mg tablet Take 1 tablet by mouth daily except on Wednesdays  • gabapentin (NEURONTIN) 300 mg capsule Take 900 mg by mouth daily Take 300 in AM and 600 in PM    • ibuprofen (MOTRIN) 800 mg tablet Take 800 mg by mouth as needed PRN   • levothyroxine 112 mcg tablet Take 112 mcg by mouth in the morning   • lidocaine-prilocaine (EMLA) cream APPLY TOPICALLY AS NEEDED FOR MILD PAIN (PAIN SCORE 1-3)     • methotrexate 2 5 mg tablet Takes 8 pills weekly on wednesday    • montelukast (SINGULAIR) 10 mg tablet Take 10 mg by mouth daily   • nystatin-triamcinolone (MYCOLOG-II) cream PRN   • omeprazole (PriLOSEC) 40 MG capsule Take 1 capsule (40 mg total) by mouth daily   • predniSONE 10 mg tablet Take 1 tablet (10 mg total) by mouth daily as needed (inflammation)   • SUMAtriptan (Imitrex) 25 mg tablet 1 p o  at headache onset, may repeat 1 after 2 hours p r n  Objective     /74 (BP Location: Left arm, Patient Position: Sitting, Cuff Size: Adult)   Temp 98 °F (36 7 °C)   Ht 5' 3" (1 6 m)   Wt 78 5 kg (173 lb)   BMI 30 65 kg/m²     Physical Exam  Vitals reviewed  Constitutional:       General: She is not in acute distress  Appearance: Normal appearance  HENT:      Head: Normocephalic and atraumatic  Right Ear: External ear normal       Left Ear: External ear normal       Nose: Nose normal       Mouth/Throat:      Mouth: Mucous membranes are moist    Eyes:      Extraocular Movements: Extraocular movements intact  Conjunctiva/sclera: Conjunctivae normal       Pupils: Pupils are equal, round, and reactive to light  Cardiovascular:      Rate and Rhythm: Normal rate and regular rhythm  Heart sounds: Normal heart sounds  Pulmonary:      Effort: Pulmonary effort is normal       Breath sounds: Normal breath sounds  No wheezing, rhonchi or rales  Abdominal:      General: Bowel sounds are normal  There is no distension  Palpations: Abdomen is soft  Tenderness: There is no abdominal tenderness  Musculoskeletal:      Cervical back: Neck supple  Right lower leg: No edema  Left lower leg: No edema  Lymphadenopathy:      Cervical: No cervical adenopathy  Skin:     General: Skin is warm  Capillary Refill: Capillary refill takes less than 2 seconds  Findings: No rash  Neurological:      Mental Status: She is alert  Mental status is at baseline         Marce Stephens DO

## 2023-03-21 NOTE — TELEPHONE ENCOUNTER
Upon review of the In Basket request we were able to locate, review, and update the patient chart as requested for Mammogram     Any additional questions or concerns should be emailed to the Practice Liaisons via the appropriate education email address, please do not reply via In Basket      Thank you  Belinda Dumas MA

## 2023-03-23 NOTE — PROGRESS NOTES
Dino De Paz   1960    CC:  Yearly exam    A:  Yearly exam      Problem List Items Addressed This Visit    None  Visit Diagnoses     Encounter for gynecological examination (general) (routine) without abnormal findings    -  Primary          P:   Pap up to date  We reviewed ASCCP guidelines for Pap testing  Mammo per oncology   Colonoscopy up to date    RTO one year for yearly exam or sooner as needed  S:  65 y o  female here for yearly exam  She is postmenopausal and has had no vaginal bleeding  She denies vaginal discharge, itching, odor or dryness  Sexual activity: She is not sexually active  She has  from her , considering whether or not they will reconcile as he has asked for many changes, but does not see how he might be part of any of their problems  Currently living on her own and enjoying the freedom overall  STD testing: She does not want STD testing today       Menopausal    Last Pap: 3/2022 NILM/HPV -  Last Mammo: 2022 BIRADS 2  Last Colonoscopy: 2023 cologuard negative    Non-smoker, social drinker  Exercises irregularly    Family hx of breast cancer: Self, MGM   Family hx of ovarian cancer: denies  Family hx of colon cancer: denies       Current Outpatient Medications:   •  albuterol (Ventolin HFA) 90 mcg/act inhaler, Inhale 2 puffs every 4 (four) hours as needed for wheezing or shortness of breath, Disp: 18 g, Rfl: 1  •  Biotin 1000 MCG tablet, Take 1,000 mcg by mouth daily, Disp: , Rfl:   •  buPROPion (Wellbutrin XL) 150 mg 24 hr tablet, Take 1 tablet (150 mg total) by mouth every morning, Disp: 30 tablet, Rfl: 3  •  Cholecalciferol 25 MCG (1000 UT) tablet, Take by mouth, Disp: , Rfl:   •  cyclobenzaprine (FLEXERIL) 5 mg tablet, Take 5 mg by mouth 3 (three) times a day as needed, Disp: , Rfl:   •  Diclofenac Epolamine 1 3 % PTCH, 2 (two) times a day as needed  , Disp: , Rfl:   •  Diclofenac Sodium (VOLTAREN) 1 %, APPLY 1 APPLICATION TOPICALLY 4 (FOUR) TIMES A DAY  APPLY TO AFFECTED AREA , Disp: , Rfl:   •  fexofenadine (ALLEGRA) 180 MG tablet, TAKE 1 TABLET BY MOUTH EVERY DAY, Disp: 30 tablet, Rfl: 2  •  fluticasone (FLONASE) 50 mcg/act nasal spray, SPRAY 2 SPRAYS INTO EACH NOSTRIL EVERY DAY, Disp: 48 mL, Rfl: 1  •  folic acid (FOLVITE) 1 mg tablet, Take 1 tablet by mouth daily except on   , Disp: , Rfl:   •  gabapentin (NEURONTIN) 300 mg capsule, Take 900 mg by mouth daily Take 300 in AM and 600 in PM , Disp: , Rfl:   •  ibuprofen (MOTRIN) 800 mg tablet, Take 800 mg by mouth as needed PRN, Disp: , Rfl:   •  levothyroxine 112 mcg tablet, Take 112 mcg by mouth in the morning, Disp: , Rfl:   •  lidocaine-prilocaine (EMLA) cream, APPLY TOPICALLY AS NEEDED FOR MILD PAIN (PAIN SCORE 1-3)  , Disp: , Rfl:   •  methotrexate 2 5 mg tablet, Takes 8 pills weekly on wednesday , Disp: , Rfl:   •  montelukast (SINGULAIR) 10 mg tablet, Take 10 mg by mouth daily, Disp: , Rfl:   •  nystatin-triamcinolone (MYCOLOG-II) cream, PRN, Disp: , Rfl:   •  omeprazole (PriLOSEC) 40 MG capsule, Take 1 capsule (40 mg total) by mouth daily, Disp: 90 capsule, Rfl: 2  •  predniSONE 10 mg tablet, Take 1 tablet (10 mg total) by mouth daily as needed (inflammation), Disp: 30 tablet, Rfl: 0  •  SUMAtriptan (Imitrex) 25 mg tablet, 1 p o  at headache onset, may repeat 1 after 2 hours p r n , Disp: 9 tablet, Rfl: 5  Social History     Socioeconomic History   • Marital status: Unknown     Spouse name: Not on file   • Number of children: Not on file   • Years of education: Not on file   • Highest education level: Not on file   Occupational History   • Not on file   Tobacco Use   • Smoking status: Former     Types: Cigarettes     Start date: 0     Quit date:      Years since quittin 2   • Smokeless tobacco: Never   Vaping Use   • Vaping Use: Never used   Substance and Sexual Activity   • Alcohol use:  Yes     Alcohol/week: 14 0 standard drinks     Types: 14 Glasses of wine per "week     Comment: a week   • Drug use: Never   • Sexual activity: Not Currently     Partners: Male   Other Topics Concern   • Not on file   Social History Narrative   • Not on file     Social Determinants of Health     Financial Resource Strain: Low Risk    • Difficulty of Paying Living Expenses: Not hard at all   Food Insecurity: Not on file   Transportation Needs: No Transportation Needs   • Lack of Transportation (Medical): No   • Lack of Transportation (Non-Medical): No   Physical Activity: Not on file   Stress: Not on file   Social Connections: Not on file   Intimate Partner Violence: Not on file   Housing Stability: Not on file     Family History   Problem Relation Age of Onset   • Skin cancer Mother    • Alzheimer's disease Mother    • Cirrhosis Father    • Coronary artery disease Brother    • Cirrhosis Brother    • Breast cancer Maternal Grandmother      Past Medical History:   Diagnosis Date   • Cancer [facility])     breast   • COPD (chronic obstructive pulmonary disease) ([facility] )    • Disease of thyroid gland    • Emphysema lung (HCC)    • Fibromyalgia    • GERD (gastroesophageal reflux disease)    • Hashimoto's thyroiditis    • History of breast cancer    • RA (rheumatoid arthritis) ([facility] )    • Rheumatoid arthritis involving multiple sites [facility])         Review of Systems   Respiratory: Negative  Cardiovascular: Negative  Gastrointestinal: Negative for constipation and diarrhea  Genitourinary: Negative for difficulty urinating, pelvic pain, vaginal bleeding, vaginal discharge, itching or odor  O:  Blood pressure 126/82, height 5' 3\" (1 6 m), weight 81 2 kg (179 lb), not currently breastfeeding  Patient appears well and is not in distress  Neck is supple without masses  Breasts are symmetrical without mass, tenderness, nipple discharge, skin changes or adenopathy  Abdomen is soft and nontender without masses  Vulva without lesions or rashes    Urethral meatus and urethra are normal  Bladder is " normal to palpation  Vagina is normal without discharge or bleeding  Cervix is normal without discharge or lesion  Uterus and adnexa are normal, mobile, nontender without palpable mass    Rectovaginal exam without nodularity/masses/visible blood on glove

## 2023-03-27 ENCOUNTER — ANNUAL EXAM (OUTPATIENT)
Dept: OBGYN CLINIC | Age: 63
End: 2023-03-27

## 2023-03-27 VITALS
WEIGHT: 179 LBS | DIASTOLIC BLOOD PRESSURE: 82 MMHG | SYSTOLIC BLOOD PRESSURE: 126 MMHG | BODY MASS INDEX: 31.71 KG/M2 | HEIGHT: 63 IN

## 2023-03-27 DIAGNOSIS — R09.82 PND (POST-NASAL DRIP): ICD-10-CM

## 2023-03-27 DIAGNOSIS — Z01.419 ENCOUNTER FOR GYNECOLOGICAL EXAMINATION (GENERAL) (ROUTINE) WITHOUT ABNORMAL FINDINGS: ICD-10-CM

## 2023-03-27 DIAGNOSIS — Z91.89 GYN EXAM FOR HIGH-RISK MEDICARE PATIENT: Primary | ICD-10-CM

## 2023-03-27 DIAGNOSIS — M06.00 SERONEGATIVE RHEUMATOID ARTHRITIS (HCC): ICD-10-CM

## 2023-03-27 DIAGNOSIS — Z91.09 ENVIRONMENTAL ALLERGIES: ICD-10-CM

## 2023-03-27 DIAGNOSIS — Z85.3 HISTORY OF BREAST CANCER IN FEMALE: ICD-10-CM

## 2023-03-28 RX ORDER — FLUTICASONE PROPIONATE 50 MCG
SPRAY, SUSPENSION (ML) NASAL
Qty: 48 ML | Refills: 1 | Status: SHIPPED | OUTPATIENT
Start: 2023-03-28

## 2023-03-28 RX ORDER — PREDNISONE 10 MG/1
10 TABLET ORAL DAILY PRN
Qty: 30 TABLET | Refills: 0 | Status: SHIPPED | OUTPATIENT
Start: 2023-03-28

## 2023-04-07 ENCOUNTER — APPOINTMENT (OUTPATIENT)
Dept: LAB | Facility: HOSPITAL | Age: 63
End: 2023-04-07
Attending: FAMILY MEDICINE

## 2023-04-07 DIAGNOSIS — E78.2 MIXED HYPERLIPIDEMIA: ICD-10-CM

## 2023-04-07 DIAGNOSIS — M06.00 SERONEGATIVE RHEUMATOID ARTHRITIS (HCC): ICD-10-CM

## 2023-04-07 DIAGNOSIS — Z79.899 ENCOUNTER FOR LONG-TERM (CURRENT) USE OF OTHER MEDICATIONS: ICD-10-CM

## 2023-04-07 DIAGNOSIS — M47.816 LUMBAR SPONDYLOSIS: ICD-10-CM

## 2023-04-07 DIAGNOSIS — E06.3 HYPOTHYROIDISM DUE TO HASHIMOTO'S THYROIDITIS: ICD-10-CM

## 2023-04-07 DIAGNOSIS — R73.01 IFG (IMPAIRED FASTING GLUCOSE): ICD-10-CM

## 2023-04-07 DIAGNOSIS — M79.7 FIBROMYALGIA: ICD-10-CM

## 2023-04-07 DIAGNOSIS — E03.8 HYPOTHYROIDISM DUE TO HASHIMOTO'S THYROIDITIS: ICD-10-CM

## 2023-04-07 LAB
ALBUMIN SERPL BCP-MCNC: 4.1 G/DL (ref 3.5–5)
ALP SERPL-CCNC: 89 U/L (ref 34–104)
ALT SERPL W P-5'-P-CCNC: 23 U/L (ref 7–52)
ANION GAP SERPL CALCULATED.3IONS-SCNC: 6 MMOL/L (ref 4–13)
AST SERPL W P-5'-P-CCNC: 24 U/L (ref 13–39)
BASOPHILS # BLD AUTO: 0.09 THOUSANDS/ÂΜL (ref 0–0.1)
BASOPHILS NFR BLD AUTO: 2 % (ref 0–1)
BILIRUB SERPL-MCNC: 0.82 MG/DL (ref 0.2–1)
BUN SERPL-MCNC: 11 MG/DL (ref 5–25)
CALCIUM SERPL-MCNC: 9.2 MG/DL (ref 8.4–10.2)
CHLORIDE SERPL-SCNC: 105 MMOL/L (ref 96–108)
CHOLEST SERPL-MCNC: 226 MG/DL
CO2 SERPL-SCNC: 28 MMOL/L (ref 21–32)
CREAT SERPL-MCNC: 0.92 MG/DL (ref 0.6–1.3)
CRP SERPL QL: 6.7 MG/L
EOSINOPHIL # BLD AUTO: 0.14 THOUSAND/ÂΜL (ref 0–0.61)
EOSINOPHIL NFR BLD AUTO: 3 % (ref 0–6)
ERYTHROCYTE [DISTWIDTH] IN BLOOD BY AUTOMATED COUNT: 16.1 % (ref 11.6–15.1)
ERYTHROCYTE [SEDIMENTATION RATE] IN BLOOD: 7 MM/HOUR (ref 0–29)
GFR SERPL CREATININE-BSD FRML MDRD: 67 ML/MIN/1.73SQ M
GLUCOSE P FAST SERPL-MCNC: 89 MG/DL (ref 65–99)
HCT VFR BLD AUTO: 37.2 % (ref 34.8–46.1)
HDLC SERPL-MCNC: 54 MG/DL
HGB BLD-MCNC: 11.3 G/DL (ref 11.5–15.4)
IMM GRANULOCYTES # BLD AUTO: 0.02 THOUSAND/UL (ref 0–0.2)
IMM GRANULOCYTES NFR BLD AUTO: 0 % (ref 0–2)
LDLC SERPL CALC-MCNC: 149 MG/DL (ref 0–100)
LYMPHOCYTES # BLD AUTO: 0.99 THOUSANDS/ÂΜL (ref 0.6–4.47)
LYMPHOCYTES NFR BLD AUTO: 18 % (ref 14–44)
MCH RBC QN AUTO: 22.5 PG (ref 26.8–34.3)
MCHC RBC AUTO-ENTMCNC: 30.4 G/DL (ref 31.4–37.4)
MCV RBC AUTO: 74 FL (ref 82–98)
MONOCYTES # BLD AUTO: 0.33 THOUSAND/ÂΜL (ref 0.17–1.22)
MONOCYTES NFR BLD AUTO: 6 % (ref 4–12)
NEUTROPHILS # BLD AUTO: 3.96 THOUSANDS/ÂΜL (ref 1.85–7.62)
NEUTS SEG NFR BLD AUTO: 71 % (ref 43–75)
NONHDLC SERPL-MCNC: 172 MG/DL
NRBC BLD AUTO-RTO: 0 /100 WBCS
PLATELET # BLD AUTO: 352 THOUSANDS/UL (ref 149–390)
PMV BLD AUTO: 9.3 FL (ref 8.9–12.7)
POTASSIUM SERPL-SCNC: 4.2 MMOL/L (ref 3.5–5.3)
PROT SERPL-MCNC: 7.1 G/DL (ref 6.4–8.4)
RBC # BLD AUTO: 5.02 MILLION/UL (ref 3.81–5.12)
SODIUM SERPL-SCNC: 139 MMOL/L (ref 135–147)
T4 FREE SERPL-MCNC: 0.84 NG/DL (ref 0.76–1.46)
TRIGL SERPL-MCNC: 117 MG/DL
TSH SERPL DL<=0.05 MIU/L-ACNC: 11.09 UIU/ML (ref 0.45–4.5)
WBC # BLD AUTO: 5.53 THOUSAND/UL (ref 4.31–10.16)

## 2023-04-08 LAB
ANA SER QL IA: NEGATIVE
EST. AVERAGE GLUCOSE BLD GHB EST-MCNC: 105 MG/DL
HBA1C MFR BLD: 5.3 %
RHEUMATOID FACT SER QL LA: NEGATIVE

## 2023-04-10 PROBLEM — D56.9 THALASSEMIA: Status: ACTIVE | Noted: 2023-04-10

## 2023-04-24 DIAGNOSIS — J44.9 CHRONIC OBSTRUCTIVE PULMONARY DISEASE, UNSPECIFIED COPD TYPE (HCC): ICD-10-CM

## 2023-04-24 RX ORDER — ALBUTEROL SULFATE 90 UG/1
AEROSOL, METERED RESPIRATORY (INHALATION)
Qty: 18 G | Refills: 1 | Status: SHIPPED | OUTPATIENT
Start: 2023-04-24

## 2023-04-29 DIAGNOSIS — Z91.09 ENVIRONMENTAL ALLERGIES: ICD-10-CM

## 2023-04-29 RX ORDER — FEXOFENADINE HCL 180 MG/1
TABLET ORAL
Qty: 30 TABLET | Refills: 2 | Status: SHIPPED | OUTPATIENT
Start: 2023-04-29

## 2023-04-30 DIAGNOSIS — M06.00 SERONEGATIVE RHEUMATOID ARTHRITIS (HCC): ICD-10-CM

## 2023-05-01 ENCOUNTER — OFFICE VISIT (OUTPATIENT)
Dept: BARIATRICS | Facility: CLINIC | Age: 63
End: 2023-05-01

## 2023-05-01 VITALS
DIASTOLIC BLOOD PRESSURE: 80 MMHG | WEIGHT: 177.5 LBS | SYSTOLIC BLOOD PRESSURE: 130 MMHG | BODY MASS INDEX: 32.66 KG/M2 | TEMPERATURE: 97.6 F | HEIGHT: 62 IN | HEART RATE: 76 BPM

## 2023-05-01 DIAGNOSIS — E66.9 OBESITY, CLASS I, BMI 30-34.9: ICD-10-CM

## 2023-05-01 DIAGNOSIS — Z98.84 BARIATRIC SURGERY STATUS: ICD-10-CM

## 2023-05-01 DIAGNOSIS — K91.2 POSTSURGICAL MALABSORPTION: ICD-10-CM

## 2023-05-01 DIAGNOSIS — Z48.815 ENCOUNTER FOR SURGICAL AFTERCARE FOLLOWING SURGERY OF DIGESTIVE SYSTEM: Primary | ICD-10-CM

## 2023-05-01 DIAGNOSIS — R10.13 EPIGASTRIC PAIN: ICD-10-CM

## 2023-05-01 DIAGNOSIS — D56.9 THALASSEMIA: ICD-10-CM

## 2023-05-01 RX ORDER — PREDNISONE 10 MG/1
10 TABLET ORAL DAILY PRN
Qty: 30 TABLET | Refills: 0 | Status: SHIPPED | OUTPATIENT
Start: 2023-05-01

## 2023-05-01 NOTE — PROGRESS NOTES
Assessment/Plan:     Patient ID: Angelica Morgan is a 65 y o  female  Bariatric Surgery Status    -s/p Vertical Sleeve Gastrectomy at [facility], [address] [place] [address] in 2013  Presents to the office today for annual to establish care with concerns of abdominal pain and weight regain  Epigastric Abdominal pain/GERD - started approximately 6 months ago, located in the mid-epigastric region without any radiation  Feels like a pressure without any associated symptoms  Does not noticed specific triggers but notices her pain occurring intermittently approximately 30-60 minutes after food intake  She is taking omeprazole 40 mg PO QD prior to her surgery and continued with this after her surgery  Does get heartburn if she misses a dose  - Denies having an endoscopy since her surgery  - Drinks ETOH 1-2 glasses of wine per day (reports she dilutes this), takes ibuprofen/NSAIDs occasionally due to chronic lower back pain - last use was 1 month ago  Does take steroids 10 mg PO PRN for her back pain as well - last used 1 month ago  - denies smoking  Denies caffeine intake or eating foods that would trigger her reflux  Abnormal weight gain -   Initial - 190 lbs   Current 177 5 lbs  Rnady - 135 lbs    Weight gain from 135 lbs started approximately In 2013 but started to increase in her weight over time  She was also started on multiple medications that she thinks it has contributed to her weight gain -   She's still is eating small meals; continues to feel full  Has been going through a lot of stress in the past year hence she is not eating healthy  Tends to skip breakfast and meals  She is craving more sweets and more carbs  Does not follow 30/60 minute rule and does not have a routine exercise due to chronic back pain  Had tried phentermine which was helpful but once she stopped this, her weight increased again       PLAN:     - Patient would like to talk to our surgeon to discuss whether or not she is a surgical candidate for a conversion to alleviate her GERD and stop chronic use of omeprazole  She resides in [facility] and would like to see our surgeon in [place]  Will refer to Dr Lai Hernandez  - Obtain UGI to further evaluate her abdominal pain  - recommend getting an endoscopy to evaluate her pain and also her reflux  Also provided information with [facility] if she would like to pursue no surgical intervention to help with weight loss  Follow up after endoscopy with surgeon to discuss surgical options and further evaluate her abdominal pain  - Routine follow up in 1 year for annual with surgical PA in [place]  - Continue with healthy lifestyle, adequate protein intake of 60 gm, fluid intake of at least 64 oz  - Continue with MVI daily  - Activity as tolerated  - Labs ordered and will adjust accordingly if any deficiency  - Follow up with RD and SW as needed  · Continued/Maintain healthy weight loss with good nutrition intakes  · Adequate hydration with at least 64oz  fluid intake  · Follow diet as discussed  · Follow vitamin and mineral recommendations as reviewed with you  · Exercise as tolerated  · Colonoscopy referral made: patient deferred this  Education provided  · Mammogram - defer for now  · Sleeve screening - Recommend to get done  Agreeable  · Follow-up in 1 year for annual visit  We kindly ask that your arrive 15 minutes before your scheduled appointment time with your provider to allow our staff to room you, get your vital signs and update your chart  · Get lab work done prior to annual visit  Please call the office if you need a script  It is recommended to check with your insurance BEFORE getting labs done to make sure they are covered by your policy  · Call our office if you have any problems with abdominal pain especially associated with fever, chills, nausea, vomiting or any other concerns      · All  Post-bariatric surgery patients should be aware that very small quantities of any alcohol can cause impairment and it is very possible not to feel the effect  The effect can be in the system for several hours  It is also a stomach irritant  · It is advised to AVOID alcohol, Nonsteroidal antiinflammatory drugs (NSAIDS) and nicotine of all forms   Any of these can cause stomach irritation/pain  · Discussed the effects of alcohol on a bariatric patient and the increased impairment risk  · Keep up the good work! Postsurgical Malabsorption   -At risk for malabsorption of vitamins/minerals secondary to malabsorption and restriction of intake from bariatric surgery  -Currently taking adequate postop bariatric surgery vitamin supplementation  -Next set of bariatric labs ordered for approximately 2 weeks  -Patient received education about the importance of adhering to a lifelong supplementation regimen to avoid vitamin/mineral deficiencies      Diagnoses and all orders for this visit:    Encounter for surgical aftercare following surgery of digestive system  -     FL UPPER GI UGI; Future  -     Zinc; Future  -     Vitamin D 25 hydroxy; Future  -     Vitamin B12; Future  -     Vitamin B1, whole blood; Future  -     Vitamin A; Future  -     PTH, intact; Future  -     Ferritin; Future  -     Folate; Future  -     Iron Saturation %; Future    BMI 30 0-30 9,adult  -     Ambulatory Referral to Weight Management    Bariatric surgery status  -     FL UPPER GI UGI; Future  -     Zinc; Future  -     Vitamin D 25 hydroxy; Future  -     Vitamin B12; Future  -     Vitamin B1, whole blood; Future  -     Vitamin A; Future  -     PTH, intact; Future  -     Ferritin; Future  -     Folate; Future  -     Iron Saturation %; Future    Postsurgical malabsorption  -     Zinc; Future  -     Vitamin D 25 hydroxy; Future  -     Vitamin B12; Future  -     Vitamin B1, whole blood; Future  -     Vitamin A; Future  -     PTH, intact; Future  -     Ferritin; Future  -     Folate;  Future  -     Iron Saturation %; Future    Obesity, Class I, BMI 30-34 9  -     Zinc; Future  -     Vitamin D 25 hydroxy; Future  -     Vitamin B12; Future  -     Vitamin B1, whole blood; Future  -     Vitamin A; Future  -     PTH, intact; Future  -     Ferritin; Future  -     Folate; Future  -     Iron Saturation %; Future    Epigastric pain  -     FL UPPER GI UGI; Future  -     Zinc; Future  -     Vitamin D 25 hydroxy; Future  -     Vitamin B12; Future  -     Vitamin B1, whole blood; Future  -     Vitamin A; Future  -     PTH, intact; Future  -     Ferritin; Future  -     Folate; Future  -     Iron Saturation %; Future    Thalassemia         Subjective:      Patient ID: George Archuleta is a 65 y o  female  -s/p Vertical Sleeve Gastrectomy at [place], [place] in 2013  Presents to the office today for annual to establish care with concerns of abdominal pain and weight regain  Epigastric Abdominal pain/GERD - started approximately 6 months ago, located in the mid-epigastric region without any radiation  Feels like a pressure without any associated symptoms  Does not noticed specific triggers but notices her pain occurring intermittently approximately 30-60 minutes after food intake  She is taking omeprazole 40 mg PO QD prior to her surgery and continued with this after her surgery  Does get heartburn if she misses a dose  - Denies having an endoscopy since her surgery  - Drinks ETOH 1-2 glasses of wine per day (reports she dilutes this), takes ibuprofen/NSAIDs occasionally due to chronic lower back pain - last use was 1 month ago  Does take steroids 10 mg PO PRN for her back pain as well - last used 1 month ago  - denies smoking  Denies caffeine intake or eating foods that would trigger her reflux  Abnormal weight gain -   Initial - 190 lbs   Current 177 5 lbs  Randy - 135 lbs    Weight gain from 135 lbs started approximately In 2013 but started to increase in her weight over time   She was also started on multiple medications that she thinks it has contributed to her weight gain -   She's still is eating small meals; continues to feel full  Has been going through a lot of stress in the past year hence she is not eating healthy  Tends to skip breakfast and meals  She is craving more sweets and more carbs  Does not follow 30/60 minute rule and does not have a routine exercise due to chronic back pain  Had tried phentermine which was helpful but once she stopped this, her weight increased again  Initial: 190 lbs  Current: 177 5 lbs  EWL: (Weight loss is ahead of schedule at this post surgical period )  Randy: 135 lbs  Current BMI is Body mass index is 32 47 kg/m²  · Tolerating a regular diet-yes  · Eating at least 60 grams of protein per day-yes  · Following 30/60 minute rule with liquids-no  · Drinking at least 64 ounces of fluid per day-yes  · Drinking carbonated beverages-yes - seltzer water   · Sufficient exercise-no - encouraged to increase this  · Using NSAIDs regularly-yes - rarely  · Using nicotine-no  · Using alcohol-yes - 2 cups of wine per day - limit   · Supplements: Multivitamins and vitamin D, calcium  · EWL is 6%, which DOES NOT places the patient ahead of schedule for expected post surgical weight loss at this time  The following portions of the patient's history were reviewed and updated as appropriate: allergies, current medications, past family history, past medical history, past social history, past surgical history and problem list     Review of Systems   Constitutional: Positive for fatigue  Respiratory: Negative  Cardiovascular: Negative  Gastrointestinal: Positive for abdominal pain (epigastric region)  Negative for constipation, diarrhea, nausea and vomiting  Musculoskeletal: Positive for arthralgias and back pain  Neurological: Negative  Psychiatric/Behavioral: Negative            Objective:    /80   Pulse 76   Temp 97 6 °F (36 4 °C) (Tympanic)   Ht "5' 2\" (1 575 m)   Wt 80 5 kg (177 lb 8 oz)   BMI 32 47 kg/m²      Physical Exam  Vitals and nursing note reviewed  Constitutional:       Appearance: Normal appearance  She is obese  Cardiovascular:      Rate and Rhythm: Normal rate and regular rhythm  Pulses: Normal pulses  Heart sounds: Normal heart sounds  Pulmonary:      Effort: Pulmonary effort is normal       Breath sounds: Normal breath sounds  Abdominal:      General: Bowel sounds are normal       Palpations: Abdomen is soft  Tenderness: There is no abdominal tenderness  Hernia: No hernia is present  Musculoskeletal:         General: Normal range of motion  Skin:     General: Skin is warm and dry  Neurological:      General: No focal deficit present  Mental Status: She is alert and oriented to person, place, and time  Psychiatric:         Mood and Affect: Mood normal          Behavior: Behavior normal          Thought Content:  Thought content normal          Judgment: Judgment normal              "

## 2023-05-02 ENCOUNTER — EVALUATION (OUTPATIENT)
Age: 63
End: 2023-05-02

## 2023-05-02 DIAGNOSIS — H81.10 BENIGN PAROXYSMAL POSITIONAL VERTIGO, UNSPECIFIED LATERALITY: Primary | ICD-10-CM

## 2023-05-02 NOTE — PROGRESS NOTES
PT Evaluation          POC expires   2023                   Visit/Unit Tracking                                                         Today's date: 2023  Patient name: Forrestine Mcardle  : 1960  MRN: [id number]  Referring provider: Adriana Snowden DO  Dx:   Encounter Diagnosis     ICD-10-CM    1  Benign paroxysmal positional vertigo, unspecified laterality  H81 10 Ambulatory Referral to Physical Therapy            Assessment  Assessment details: Patient is a 65 y o  Female who presents to skilled outpatient PT with BPPV on left side  Positional testing positive for up beating torsional nystagmus on left side last less than 10 seconds  Eply conducted 3 times with negative findings after 3rd attempt  Educational handout given to patient about BPPV and all questions answered  Patient will benefit from skilled PT to address vertigo and return to PLOF of symptom free  Patient verbalized understanding of POC  Please contact me if you have any questions or recommendations  Thank you for the referral and the opportunity to share in Jorge Renner's care        Cut off score   All date taken from [facility] Neuro Section or Rehab Measures    DGI:  MDC for Vestibular Disorders: 4 points  [address] for Geriatrics/Community Dwelling Older Adults: 3 Points  Falls risk cut off: <19/24    FGA:  MCID: 4 points  Geriatrics/Community Dwelling Older Adults: </= 22/30 fall risk  Geriatrics/Community Dwelling Older Adults: </= 20/30 unexplained falls in the next 6 months  Parkinsons: </= 18/30 fall risk    mCTSIB (normed on ages 19-56, lower number is less sway or better static balance)  Eyes open firm surface (norm 0 21-0 48)  Eyes closed firm surface (norm 0 48-0 99)  Eyes open foam surface (norm 0 38-0 71)  Eyes closed foam surface (norm 0 70-2 22)    DHI:  0-39: low perception of handicap  40-69: moderate perception of handicap  : severe perception of handicap  > 60: increased risk for falls        Impairments: activity intolerance, impaired balance, lacks appropriate, HEP, safety issue  Understanding of Dx/Px/POC: Good  Prognosis: Good      Goals    BPPV Goals (4 weeks):  - Patient will report complete resolution of symptoms in order to promote return to PLOF  - Patient will complete FGA in order to promote return to safe performance of ADLs  - Patient will demonstrate (-) [place]-Hallpike test on L side    Plan  Patient would benefit from: PT Eval  Planned therapy interventions: balance, HEP, manual therapy, neuromuscular re-education, patient education  Frequency: 1-3x per week  Duration in weeks: 4 weeks  Plan of Care beginning date: 2023  Plan of Care expiration date: 1 month - 2023  Treatment plan discussed with: Patient        Subjective Evaluation    History of Present Illness  - Mechanism of injury: Pt reports getting spinning sensation about 2 months ago when rolling onto her side to get out of bed  Gets a spinning sensation when getting into bed and rolling in bed         Dizziness Subjective  - How long does dizziness last: seconds less than 30   - How would you describe the dizziness:  Spinning   - Rolling in bed: Yes  - Supine to/from sit: Yes  - Recent hearing loss: No  - Tinnitus: No  - Aural fullness/ear pain: No  - Vision changes: No  - History of recent viral infections: No  - History of migraines: No    Red Flag Screen  - Numbness: No  - Tingling: No  - Weakness: No  - Unilateral hearing loss: No  - Slurred speech: No  - Progressive hearing loss: No  - Tremors: No  - Poor coordination: No  - UMN signs: No  - LoC: No  - Rigidity: No  - Visual field loss: No  - Memory loss: No  - CN dysfunction: No  - Vertical nystagmus: No    Pain  Current pain ratin/10  At best pain ratin/10  At worst pain ratin/10  Location: NA  Aggravating factors: NA    Social Support  Steps to enter house: 3 steps   Stairs in house: [place] Lives in: [facility] house   Lives with: spouse     Employment status: retired   Hand dominance: right     Treatments  Previous treatment: NA  Current treatment: NA  Diagnostic Testing: NA      Objective     BPPV Objective  Integrity Testing  - mVBI: denies any passing out sensation     C Spine range of motion:  WFL all directions      Positional Testing  - R [place]-Hallpike: negative   - L [place]-Hallpike: + up beating torsional nystagmus lasting 10 seconds   - R Roll Test: negative   - L Roll Test: negative       Outcome Measures Initial Eval  5/2        mCTSIB  - FTEO (firm)  - FTEC (firm)  - FTEO (foam)  - FTEC (foam)   Defer        DGI Defer        FGA Defer        10 meter Defer        [facility] [address] 11/[address]                                                        Daily intervention:  Eply conducted 3 times: 1st attempt symptoms lasting 6 seconds and roll to side lying lasting 2 seconds  2nd attempt symptoms lasting 3 seconds, none with rolling  3rd attempt negative for dizziness/ nystagmus       Precautions: standard fall risks   Past Medical History:   Diagnosis Date    Cancer ([facility] [address] )     breast    COPD (chronic obstructive pulmonary disease) (HCC)     Disease of thyroid gland     Emphysema lung (HCC)     Fibromyalgia     GERD (gastroesophageal reflux disease)     Hashimoto's thyroiditis     History of breast cancer     RA (rheumatoid arthritis) ([facility] [address] )     Rheumatoid arthritis involving multiple sites ([facility] [address] )

## 2023-05-04 ENCOUNTER — OFFICE VISIT (OUTPATIENT)
Age: 63
End: 2023-05-04

## 2023-05-04 DIAGNOSIS — H81.10 BENIGN PAROXYSMAL POSITIONAL VERTIGO, UNSPECIFIED LATERALITY: Primary | ICD-10-CM

## 2023-05-04 NOTE — PROGRESS NOTES
Daily Note     Today's date: 2023  Patient name: Link Meredith  : 1960  MRN: [id number]  Referring provider: Duane Fothergill, DO  Dx:   Encounter Diagnosis     ICD-10-CM    1  Benign paroxysmal positional vertigo, unspecified laterality  H81 10                      Subjective: Pt reports spinning dizziness once when getting into bed that night after IE  Yesterday and today symptom free  Feels back to PLOF  Objective: See treatment diary below    Positional Testing  - R [place]-Hallpike: negative   - L [place]-Hallpike: negative   - R Roll Test: negative   - L Roll Test: negative     FGA:     BPPV Goals (4 weeks):  - Patient will report complete resolution of symptoms in order to promote return to PLOF  MET  - Patient will complete FGA in order to promote return to safe performance of ADLs MET  - Patient will demonstrate (-) [place]-Hallpike test on L side MET    Assessment: Pt had negative finding with positional testing x 2 each position tested  Will be placed on 2 week hold with understanding of DC after hold time  Education patient to contact clinic again if dizziness returns  Pt was agreeable to PT recommendation         Plan: Hold 2 weeks with understanding of DC after hold period      POC expires   2023                   Visit/Unit Tracking

## 2023-05-16 ENCOUNTER — HOSPITAL ENCOUNTER (OUTPATIENT)
Dept: RADIOLOGY | Facility: CLINIC | Age: 63
Discharge: HOME | End: 2023-05-16

## 2023-05-16 VITALS
HEART RATE: 77 BPM | DIASTOLIC BLOOD PRESSURE: 78 MMHG | RESPIRATION RATE: 18 BRPM | OXYGEN SATURATION: 98 % | SYSTOLIC BLOOD PRESSURE: 127 MMHG | TEMPERATURE: 97.2 F

## 2023-05-16 DIAGNOSIS — M47.816 LUMBAR FACET ARTHROPATHY: ICD-10-CM

## 2023-05-16 RX ORDER — BUPIVACAINE HYDROCHLORIDE 7.5 MG/ML
1.5 INJECTION, SOLUTION EPIDURAL; RETROBULBAR ONCE
Status: COMPLETED | OUTPATIENT
Start: 2023-05-16 | End: 2023-05-16

## 2023-05-16 RX ORDER — BUPIVACAINE HCL/PF 2.5 MG/ML
1.5 VIAL (ML) INJECTION ONCE
Status: DISCONTINUED | OUTPATIENT
Start: 2023-05-16 | End: 2023-05-16

## 2023-05-16 RX ADMIN — BUPIVACAINE HYDROCHLORIDE 1.5 ML: 7.5 INJECTION, SOLUTION EPIDURAL; RETROBULBAR at 11:11

## 2023-05-16 NOTE — DISCHARGE INSTR - LAB

## 2023-05-16 NOTE — H&P
History of Present Illness: The patient is a 65 y o  female who presents with complaints of right sided low back pain    Past Medical History:   Diagnosis Date   • Cancer ([facility] [address] )     breast   • COPD (chronic obstructive pulmonary disease) (HCC)    • Disease of thyroid gland    • Emphysema lung (HCC)    • Fibromyalgia    • GERD (gastroesophageal reflux disease)    • Hashimoto's thyroiditis    • History of breast cancer    • RA (rheumatoid arthritis) ([facility] [address] )    • Rheumatoid arthritis involving multiple sites [facility])        Past Surgical History:   Procedure Laterality Date   • APPENDECTOMY     • BREAST LUMPECTOMY Left    • CHOLECYSTECTOMY     • CYST REMOVAL  04/01/2022   • GASTRIC BYPASS     • ROTATOR CUFF REPAIR Right    • SHOULDER SURGERY Right     Muscle tendon transfer    • WOUND DEBRIDEMENT N/A 02/23/2022    Procedure: EXCISIONAL DEBRIDEMENT OF BACK;  Surgeon: Rosa Hutchison MD;  Location: [facility] OR;  Service: General         Current Outpatient Medications:   •  Biotin 1000 MCG tablet, Take 1,000 mcg by mouth daily, Disp: , Rfl:   •  buPROPion (Wellbutrin XL) 150 mg 24 hr tablet, Take 1 tablet (150 mg total) by mouth every morning, Disp: 30 tablet, Rfl: 3  •  Cholecalciferol 25 MCG (1000 UT) tablet, Take by mouth (Patient not taking: Reported on 5/1/2023), Disp: , Rfl:   •  cyclobenzaprine (FLEXERIL) 5 mg tablet, Take 5 mg by mouth 3 (three) times a day as needed, Disp: , Rfl:   •  Diclofenac Epolamine 1 3 % PTCH, 2 (two) times a day as needed  , Disp: , Rfl:   •  Diclofenac Sodium (VOLTAREN) 1 %, APPLY 1 APPLICATION TOPICALLY 4 (FOUR) TIMES A DAY  APPLY TO AFFECTED AREA , Disp: , Rfl:   •  fexofenadine (ALLEGRA) 180 MG tablet, TAKE 1 TABLET BY MOUTH EVERY DAY, Disp: 30 tablet, Rfl: 2  •  fluticasone (FLONASE) 50 mcg/act nasal spray, SPRAY 2 SPRAYS INTO EACH NOSTRIL EVERY DAY, Disp: 48 mL, Rfl: 1  •  folic acid (FOLVITE) 1 mg tablet, Take 1 tablet by mouth daily except on Wednesdays   , Disp: , Rfl:   •  gabapentin (NEURONTIN) 300 mg capsule, Take 900 mg by mouth daily Take 300 in AM and 600 in PM , Disp: , Rfl:   •  ibuprofen (MOTRIN) 800 mg tablet, Take 800 mg by mouth as needed PRN, Disp: , Rfl:   •  levothyroxine 112 mcg tablet, Take 112 mcg by mouth in the morning, Disp: , Rfl:   •  lidocaine-prilocaine (EMLA) cream, APPLY TOPICALLY AS NEEDED FOR MILD PAIN (PAIN SCORE 1-3)  , Disp: , Rfl:   •  meclizine (ANTIVERT) 12 5 MG tablet, Take 1 tablet (12 5 mg total) by mouth 3 (three) times a day as needed for dizziness, Disp: 30 tablet, Rfl: 0  •  methotrexate 2 5 mg tablet, Takes 8 pills weekly on wednesday , Disp: , Rfl:   •  montelukast (SINGULAIR) 10 mg tablet, Take 10 mg by mouth daily, Disp: , Rfl:   •  nystatin-triamcinolone (MYCOLOG-II) cream, PRN, Disp: , Rfl:   •  omeprazole (PriLOSEC) 40 MG capsule, Take 1 capsule (40 mg total) by mouth daily, Disp: 90 capsule, Rfl: 2  •  predniSONE 10 mg tablet, TAKE 1 TABLET (10 MG TOTAL) BY MOUTH DAILY AS NEEDED (INFLAMMATION), Disp: 30 tablet, Rfl: 0  •  SUMAtriptan (Imitrex) 25 mg tablet, 1 p o  at headache onset, may repeat 1 after 2 hours p r n , Disp: 9 tablet, Rfl: 5  •  Ventolin  (90 Base) MCG/ACT inhaler, INHALE 2 PUFFS EVERY 4 HOURS AS NEEDED FOR WHEEZING OR SHORTNESS OF BREATH, Disp: 18 g, Rfl: 1    Allergies   Allergen Reactions   • Ciprofloxacin Hives   • Keflex [Cephalexin] Hives   • Penicillins Hives   • Sulfa Antibiotics Hives       Physical Exam:   Vitals:    05/16/23 1040   BP: 123/79   Pulse: 73   Resp: 18   Temp: (!) 97 2 °F (36 2 °C)   SpO2: 92%     General: Awake, Alert, Oriented x 3, Mood and affect appropriate  Respiratory: Respirations even and unlabored  Cardiovascular: Peripheral pulses intact; no edema  Musculoskeletal Exam: face tloading positive right    ASA Score: 3    Patient/Chart Verification  Patient ID Verified: Verbal  ID Band Applied: No  Consents Confirmed: Procedural, To be obtained in the Pre-Procedure area  H&P( within 30 days) Verified: To be obtained in the Pre-Procedure area  Interval H&P(within 24 hr) Complete (required for Outpatients and Surgery Admit only): To be obtained in the Pre-Procedure area  Allergies Reviewed: Yes  Anticoag/NSAID held?: No  Currently on antibiotics?: No    Assessment:   1   Lumbar facet arthropathy        Plan: RIGHT L4-5 and L5-S1 MBB #2

## 2023-05-23 ENCOUNTER — APPOINTMENT (OUTPATIENT)
Dept: LAB | Facility: HOSPITAL | Age: 63
End: 2023-05-23

## 2023-05-23 DIAGNOSIS — Z98.84 BARIATRIC SURGERY STATUS: ICD-10-CM

## 2023-05-23 DIAGNOSIS — Z90.3 H/O GASTRIC SLEEVE: ICD-10-CM

## 2023-05-23 DIAGNOSIS — E66.9 OBESITY, CLASS I, BMI 30-34.9: ICD-10-CM

## 2023-05-23 DIAGNOSIS — K91.2 POSTSURGICAL MALABSORPTION: ICD-10-CM

## 2023-05-23 DIAGNOSIS — D50.9 IRON DEFICIENCY ANEMIA, UNSPECIFIED IRON DEFICIENCY ANEMIA TYPE: ICD-10-CM

## 2023-05-23 DIAGNOSIS — Z48.815 ENCOUNTER FOR SURGICAL AFTERCARE FOLLOWING SURGERY OF DIGESTIVE SYSTEM: ICD-10-CM

## 2023-05-23 DIAGNOSIS — R73.01 IFG (IMPAIRED FASTING GLUCOSE): ICD-10-CM

## 2023-05-23 DIAGNOSIS — E78.2 MIXED HYPERLIPIDEMIA: ICD-10-CM

## 2023-05-23 DIAGNOSIS — D56.3 THALASSEMIA MINOR: ICD-10-CM

## 2023-05-23 DIAGNOSIS — R10.13 EPIGASTRIC PAIN: ICD-10-CM

## 2023-05-23 LAB
25(OH)D3 SERPL-MCNC: 91.7 NG/ML (ref 30–100)
ALBUMIN SERPL BCP-MCNC: 4 G/DL (ref 3.5–5)
ALP SERPL-CCNC: 94 U/L (ref 34–104)
ALT SERPL W P-5'-P-CCNC: 16 U/L (ref 7–52)
ANION GAP SERPL CALCULATED.3IONS-SCNC: 3 MMOL/L (ref 4–13)
AST SERPL W P-5'-P-CCNC: 17 U/L (ref 13–39)
BASOPHILS # BLD AUTO: 0.11 THOUSANDS/ÂΜL (ref 0–0.1)
BASOPHILS NFR BLD AUTO: 2 % (ref 0–1)
BILIRUB SERPL-MCNC: 0.72 MG/DL (ref 0.2–1)
BUN SERPL-MCNC: 12 MG/DL (ref 5–25)
CALCIUM SERPL-MCNC: 9.2 MG/DL (ref 8.4–10.2)
CHLORIDE SERPL-SCNC: 106 MMOL/L (ref 96–108)
CO2 SERPL-SCNC: 30 MMOL/L (ref 21–32)
CREAT SERPL-MCNC: 0.85 MG/DL (ref 0.6–1.3)
EOSINOPHIL # BLD AUTO: 0.26 THOUSAND/ÂΜL (ref 0–0.61)
EOSINOPHIL NFR BLD AUTO: 4 % (ref 0–6)
ERYTHROCYTE [DISTWIDTH] IN BLOOD BY AUTOMATED COUNT: 14.6 % (ref 11.6–15.1)
FERRITIN SERPL-MCNC: 46 NG/ML (ref 11–307)
FOLATE SERPL-MCNC: 8 NG/ML
GFR SERPL CREATININE-BSD FRML MDRD: 74 ML/MIN/1.73SQ M
GLUCOSE P FAST SERPL-MCNC: 90 MG/DL (ref 65–99)
HCT VFR BLD AUTO: 36.6 % (ref 34.8–46.1)
HGB BLD-MCNC: 11.2 G/DL (ref 11.5–15.4)
IMM GRANULOCYTES # BLD AUTO: 0.03 THOUSAND/UL (ref 0–0.2)
IMM GRANULOCYTES NFR BLD AUTO: 1 % (ref 0–2)
IRON SATN MFR SERPL: 28 % (ref 15–50)
IRON SERPL-MCNC: 105 UG/DL (ref 50–170)
LYMPHOCYTES # BLD AUTO: 1.6 THOUSANDS/ÂΜL (ref 0.6–4.47)
LYMPHOCYTES NFR BLD AUTO: 25 % (ref 14–44)
MCH RBC QN AUTO: 22.7 PG (ref 26.8–34.3)
MCHC RBC AUTO-ENTMCNC: 30.6 G/DL (ref 31.4–37.4)
MCV RBC AUTO: 74 FL (ref 82–98)
MONOCYTES # BLD AUTO: 0.6 THOUSAND/ÂΜL (ref 0.17–1.22)
MONOCYTES NFR BLD AUTO: 10 % (ref 4–12)
NEUTROPHILS # BLD AUTO: 3.71 THOUSANDS/ÂΜL (ref 1.85–7.62)
NEUTS SEG NFR BLD AUTO: 58 % (ref 43–75)
NRBC BLD AUTO-RTO: 0 /100 WBCS
PLATELET # BLD AUTO: 351 THOUSANDS/UL (ref 149–390)
PMV BLD AUTO: 9.6 FL (ref 8.9–12.7)
POTASSIUM SERPL-SCNC: 4 MMOL/L (ref 3.5–5.3)
PROT SERPL-MCNC: 6.9 G/DL (ref 6.4–8.4)
PTH-INTACT SERPL-MCNC: 49.5 PG/ML (ref 12–88)
RBC # BLD AUTO: 4.93 MILLION/UL (ref 3.81–5.12)
SODIUM SERPL-SCNC: 139 MMOL/L (ref 135–147)
TIBC SERPL-MCNC: 375 UG/DL (ref 250–450)
VIT B12 SERPL-MCNC: 431 PG/ML (ref 180–914)
WBC # BLD AUTO: 6.31 THOUSAND/UL (ref 4.31–10.16)

## 2023-05-24 ENCOUNTER — TELEPHONE (OUTPATIENT)
Dept: FAMILY MEDICINE CLINIC | Facility: CLINIC | Age: 63
End: 2023-05-24

## 2023-05-25 LAB — ZINC SERPL-MCNC: 83 UG/DL (ref 44–115)

## 2023-05-26 LAB — VIT B1 BLD-SCNC: 119.2 NMOL/L (ref 66.5–200)

## 2023-05-27 LAB — VIT A SERPL-MCNC: 31.5 UG/DL (ref 22–69.5)

## 2023-06-05 DIAGNOSIS — M06.00 SERONEGATIVE RHEUMATOID ARTHRITIS (HCC): ICD-10-CM

## 2023-06-06 ENCOUNTER — HOSPITAL ENCOUNTER (OUTPATIENT)
Dept: RADIOLOGY | Facility: HOSPITAL | Age: 63
Discharge: HOME | End: 2023-06-06
Payer: MEDICARE

## 2023-06-06 ENCOUNTER — TELEPHONE (OUTPATIENT)
Dept: BARIATRICS | Facility: CLINIC | Age: 63
End: 2023-06-06

## 2023-06-06 DIAGNOSIS — Z48.815 ENCOUNTER FOR SURGICAL AFTERCARE FOLLOWING SURGERY OF DIGESTIVE SYSTEM: ICD-10-CM

## 2023-06-06 DIAGNOSIS — Z98.84 BARIATRIC SURGERY STATUS: ICD-10-CM

## 2023-06-06 DIAGNOSIS — R10.13 EPIGASTRIC PAIN: ICD-10-CM

## 2023-06-06 PROCEDURE — 74240 X-RAY XM UPR GI TRC 1CNTRST: CPT

## 2023-06-06 NOTE — TELEPHONE ENCOUNTER
Pt just had her EGD done this morning, and has her F/U appt with [facility]  ----- Message from India Pallas, PA-C sent at 6/6/2023  1:58 PM EDT -----  [place] patient  Please call to confirm she is scheduled for EGD and then follow up at [facility] office

## 2023-06-07 RX ORDER — PREDNISONE 10 MG/1
10 TABLET ORAL DAILY PRN
Qty: 30 TABLET | Refills: 0 | Status: SHIPPED | OUTPATIENT
Start: 2023-06-07

## 2023-06-13 ENCOUNTER — PREP FOR PROCEDURE (OUTPATIENT)
Dept: BARIATRICS | Facility: CLINIC | Age: 63
End: 2023-06-13

## 2023-06-13 DIAGNOSIS — Z98.84 BARIATRIC SURGERY STATUS: Primary | ICD-10-CM

## 2023-06-14 ENCOUNTER — PATIENT MESSAGE (OUTPATIENT)
Dept: FAMILY MEDICINE CLINIC | Facility: CLINIC | Age: 63
End: 2023-06-14

## 2023-06-14 ENCOUNTER — OFFICE VISIT (OUTPATIENT)
Dept: FAMILY MEDICINE CLINIC | Facility: CLINIC | Age: 63
End: 2023-06-14
Payer: MEDICARE

## 2023-06-14 VITALS
WEIGHT: 181 LBS | OXYGEN SATURATION: 99 % | SYSTOLIC BLOOD PRESSURE: 98 MMHG | TEMPERATURE: 96.8 F | BODY MASS INDEX: 33.31 KG/M2 | DIASTOLIC BLOOD PRESSURE: 64 MMHG | HEART RATE: 97 BPM | HEIGHT: 62 IN

## 2023-06-14 DIAGNOSIS — J44.9 CHRONIC OBSTRUCTIVE PULMONARY DISEASE, UNSPECIFIED COPD TYPE (HCC): Primary | ICD-10-CM

## 2023-06-14 DIAGNOSIS — E06.3 HYPOTHYROIDISM DUE TO HASHIMOTO'S THYROIDITIS: Primary | ICD-10-CM

## 2023-06-14 DIAGNOSIS — F51.01 PRIMARY INSOMNIA: ICD-10-CM

## 2023-06-14 DIAGNOSIS — J44.9 CHRONIC OBSTRUCTIVE PULMONARY DISEASE, UNSPECIFIED COPD TYPE (HCC): ICD-10-CM

## 2023-06-14 DIAGNOSIS — E03.8 HYPOTHYROIDISM DUE TO HASHIMOTO'S THYROIDITIS: Primary | ICD-10-CM

## 2023-06-14 PROCEDURE — 99214 OFFICE O/P EST MOD 30 MIN: CPT | Performed by: FAMILY MEDICINE

## 2023-06-14 RX ORDER — TRAZODONE HYDROCHLORIDE 50 MG/1
50 TABLET ORAL
Qty: 30 TABLET | Refills: 1 | Status: SHIPPED | OUTPATIENT
Start: 2023-06-14

## 2023-06-14 RX ORDER — FLUTICASONE PROPIONATE AND SALMETEROL 250; 50 UG/1; UG/1
1 POWDER RESPIRATORY (INHALATION) 2 TIMES DAILY
Qty: 60 BLISTER | Refills: 5 | Status: SHIPPED | OUTPATIENT
Start: 2023-06-14 | End: 2023-12-11

## 2023-06-14 RX ORDER — ALBUTEROL SULFATE 90 UG/1
2 AEROSOL, METERED RESPIRATORY (INHALATION) EVERY 4 HOURS PRN
Qty: 18 G | Refills: 1 | Status: SHIPPED | OUTPATIENT
Start: 2023-06-14

## 2023-06-14 NOTE — TELEPHONE ENCOUNTER
From: Aquilino Martin  To: Leonard Ma  Sent: 6/14/2023 10:58 AM EDT  Subject: Advair    This medication is 100.00 to fill even with good Rx can you prescribe something else?

## 2023-06-14 NOTE — PROGRESS NOTES
Name: Reg Irwin      : 1960      MRN: [id number]  Encounter Provider: Amada Wilson DO  Encounter Date: 2023   Encounter department: [facility] [address] [address]     1  Hypothyroidism due to Hashimoto's thyroiditis  -     TSH, 3rd generation with Free T4 reflex; Future    2  Primary insomnia  -     traZODone (DESYREL) 50 mg tablet; Take 1 tablet (50 mg total) by mouth daily at bedtime    3  Chronic obstructive pulmonary disease, unspecified COPD type (HCC)  -     Ventolin  (90 Base) MCG/ACT inhaler; Inhale 2 puffs every 4 (four) hours as needed for wheezing or shortness of breath  -     Fluticasone-Salmeterol (Advair) 250-50 mcg/dose inhaler; Inhale 1 puff 2 (two) times a day Rinse mouth after use  EKG NSR in office  Subjective      HPI     Patient presents to the office for follow up  Notes that she has been feeling fatigued  Notes shortness of breath with mild exertion  Denies chest pain or chest tightness  Denies lightheadedness or dizziness  Denies nausea or abdominal pain  States that she has been taking a lot of naps  States that she is having a hard time with sleeping, states that 1-2 nights per week she gets very little sleep  States that on these days, she normally naps about 3 hours during the day  Notes that when she uses the albuterol, it is helpful for her  Has been having a hard time paying for the albuterol       Review of Systems    Current Outpatient Medications on File Prior to Visit   Medication Sig   • Biotin 1000 MCG tablet Take 1,000 mcg by mouth daily   • buPROPion (Wellbutrin XL) 150 mg 24 hr tablet Take 1 tablet (150 mg total) by mouth every morning   • Cholecalciferol 25 MCG (1000 UT) tablet Take by mouth   • cyclobenzaprine (FLEXERIL) 5 mg tablet Take 5 mg by mouth 3 (three) times a day as needed   • Diclofenac Epolamine 1 3 % PTCH 2 (two) times a day as needed     • Diclofenac Sodium "(VOLTAREN) 1 % APPLY 1 APPLICATION TOPICALLY 4 (FOUR) TIMES A DAY  APPLY TO AFFECTED AREA  • fexofenadine (ALLEGRA) 180 MG tablet TAKE 1 TABLET BY MOUTH EVERY DAY   • fluticasone (FLONASE) 50 mcg/act nasal spray SPRAY 2 SPRAYS INTO EACH NOSTRIL EVERY DAY   • folic acid (FOLVITE) 1 mg tablet Take 1 tablet by mouth daily except on Wednesdays  • gabapentin (NEURONTIN) 300 mg capsule Take 900 mg by mouth daily Take 300 in AM and 600 in PM    • ibuprofen (MOTRIN) 800 mg tablet Take 800 mg by mouth as needed PRN   • levothyroxine 112 mcg tablet Take 112 mcg by mouth in the morning   • lidocaine-prilocaine (EMLA) cream APPLY TOPICALLY AS NEEDED FOR MILD PAIN (PAIN SCORE 1-3)  • meclizine (ANTIVERT) 12 5 MG tablet Take 1 tablet (12 5 mg total) by mouth 3 (three) times a day as needed for dizziness   • methotrexate 2 5 mg tablet Takes 8 pills weekly on wednesday    • montelukast (SINGULAIR) 10 mg tablet Take 10 mg by mouth daily   • nystatin-triamcinolone (MYCOLOG-II) cream PRN   • omeprazole (PriLOSEC) 40 MG capsule Take 1 capsule (40 mg total) by mouth daily   • predniSONE 10 mg tablet TAKE 1 TABLET (10 MG TOTAL) BY MOUTH DAILY AS NEEDED (INFLAMMATION)   • SUMAtriptan (Imitrex) 25 mg tablet 1 p o  at headache onset, may repeat 1 after 2 hours p r n  • [DISCONTINUED] Ventolin  (90 Base) MCG/ACT inhaler INHALE 2 PUFFS EVERY 4 HOURS AS NEEDED FOR WHEEZING OR SHORTNESS OF BREATH     Objective     BP 98/64 (BP Location: Left arm, Patient Position: Sitting, Cuff Size: Adult)   Pulse 97   Temp (!) 96 8 °F (36 °C)   Ht 5' 2\" (1 575 m)   Wt 82 1 kg (181 lb)   SpO2 99%   BMI 33 11 kg/m²     Physical Exam  Vitals reviewed  Constitutional:       General: She is not in acute distress  Appearance: Normal appearance  HENT:      Head: Normocephalic and atraumatic        Right Ear: External ear normal       Left Ear: External ear normal       Nose: Nose normal       Mouth/Throat:      Mouth: Mucous membranes " are moist    Eyes:      Extraocular Movements: Extraocular movements intact  Conjunctiva/sclera: Conjunctivae normal    Cardiovascular:      Rate and Rhythm: Normal rate and regular rhythm  Heart sounds: Normal heart sounds  Pulmonary:      Effort: Pulmonary effort is normal       Breath sounds: Normal breath sounds  No wheezing, rhonchi or rales  Abdominal:      General: Bowel sounds are normal  There is no distension  Palpations: Abdomen is soft  Tenderness: There is no abdominal tenderness  Musculoskeletal:      Cervical back: Neck supple  Right lower leg: No edema  Left lower leg: No edema  Lymphadenopathy:      Cervical: No cervical adenopathy  Skin:     General: Skin is warm  Capillary Refill: Capillary refill takes less than 2 seconds  Findings: No rash  Neurological:      Mental Status: She is alert  Mental status is at baseline            Catia Hernandez DO

## 2023-06-15 ENCOUNTER — TELEPHONE (OUTPATIENT)
Dept: NEUROLOGY | Facility: CLINIC | Age: 63
End: 2023-06-15

## 2023-06-15 NOTE — TELEPHONE ENCOUNTER
Hello,  Patient called in and rescheduled May's OVS appt with Dr Ruben Flores  Patient wanted to know if there was a possibility that she can do a MILAN with another [facility] provider since Dr Ruben Flores is now martha part time and if he can suggest whom she can see within the network  Please call patient and coordinate accordingly  Thank You!

## 2023-06-16 ENCOUNTER — TELEPHONE (OUTPATIENT)
Dept: BARIATRICS | Facility: CLINIC | Age: 63
End: 2023-06-16

## 2023-06-16 ENCOUNTER — TELEPHONE (OUTPATIENT)
Dept: PSYCHIATRY | Facility: CLINIC | Age: 63
End: 2023-06-16

## 2023-06-16 NOTE — TELEPHONE ENCOUNTER
Spoke with patient about canceling EGD with Dr Lindsay Lozoya on July 5th and needing to schedule one with Dr Nallely Kumar at [facility]   Gave patient [facility] phone number [phone]

## 2023-06-26 ENCOUNTER — TELEPHONE (OUTPATIENT)
Dept: BARIATRICS | Facility: CLINIC | Age: 63
End: 2023-06-26

## 2023-06-26 PROBLEM — Z48.815 ENCOUNTER FOR SURGICAL AFTERCARE FOLLOWING SURGERY OF DIGESTIVE SYSTEM: Status: ACTIVE | Noted: 2023-06-26

## 2023-06-26 PROBLEM — K21.9 GERD (GASTROESOPHAGEAL REFLUX DISEASE): Status: ACTIVE | Noted: 2023-06-26

## 2023-06-26 PROBLEM — K91.2 POSTSURGICAL MALABSORPTION: Status: ACTIVE | Noted: 2023-06-26

## 2023-06-26 NOTE — ASSESSMENT & PLAN NOTE
-s/p Vertical Sleeve Gastrectomy at [place], [place] in 2013  Work on [address]  [employer] and lifestyle changes and recommend consult with surgical RD and LCSW  Initial: 185lbs  Current: 175lbs  Randy: 135lbs at 1 year s/p surgery  Current BMI is Body mass index is 32 01 kg/m²  · Tolerating a regular diet-yes  · Eating at least 60 grams of protein per day-no  · Following 30/60 minute rule with liquids-yes  · Drinking at least 64 ounces of fluid per day-no  · Drinking carbonated beverages-no  · Sufficient exercise-limited  · Using NSAIDs regularly-no  · Using nicotine-no  · Using alcohol-no   Advised about the risks of alcohol s/p bariatric surgery and recommend avoiding all alcohol

## 2023-06-26 NOTE — ASSESSMENT & PLAN NOTE
-elevated LDL - continue f/u with PCP  -Avoid fried foods and trans fat, limit saturated fats and refined carbohydrates  -Increase fish/omega 3 FA consumption  -Increase physical activity

## 2023-06-26 NOTE — ASSESSMENT & PLAN NOTE
-on methotrexate, prednisone prn, takes motrin prn  -Reviewed absolute contraindication to taking NSAIDS s/p RYGB

## 2023-06-26 NOTE — ASSESSMENT & PLAN NOTE
-At risk for malabsorption of vitamins/minerals secondary to malabsorption and restriction of intake from bariatric surgery  -NOT Currently taking adequate postop bariatric surgery vitamin supplementation: OTC MVI, calcium , Vitamin D, biotin, folic acid - unsure of amounts - needs to review with RD    -Last set of bariatric labs completed on 05/23/23 - vitamins WNL    -Next set of bariatric labs ordered for approximately 1 year  -Patient received education about the importance of adhering to a lifelong supplementation regimen to avoid vitamin/mineral deficiencies

## 2023-06-26 NOTE — ASSESSMENT & PLAN NOTE
-On omeprazole 40mg daily   -Advised avoidance of food triggers and gastric irritants, follow anti-reflux diet and lifestyle measures  -EGD to assess her esophagus and r/o Becker's

## 2023-06-28 ENCOUNTER — OFFICE VISIT (OUTPATIENT)
Dept: BARIATRICS | Facility: CLINIC | Age: 63
End: 2023-06-28
Payer: MEDICARE

## 2023-06-28 VITALS
RESPIRATION RATE: 16 BRPM | WEIGHT: 175 LBS | BODY MASS INDEX: 32.2 KG/M2 | SYSTOLIC BLOOD PRESSURE: 106 MMHG | HEART RATE: 79 BPM | DIASTOLIC BLOOD PRESSURE: 72 MMHG | HEIGHT: 62 IN

## 2023-06-28 DIAGNOSIS — R10.13 EPIGASTRIC PAIN: ICD-10-CM

## 2023-06-28 DIAGNOSIS — E06.3 HYPOTHYROIDISM DUE TO HASHIMOTO'S THYROIDITIS: ICD-10-CM

## 2023-06-28 DIAGNOSIS — M06.00 SERONEGATIVE RHEUMATOID ARTHRITIS (HCC): ICD-10-CM

## 2023-06-28 DIAGNOSIS — E78.2 MIXED HYPERLIPIDEMIA: ICD-10-CM

## 2023-06-28 DIAGNOSIS — Z12.11 COLON CANCER SCREENING: ICD-10-CM

## 2023-06-28 DIAGNOSIS — K21.9 GERD (GASTROESOPHAGEAL REFLUX DISEASE): ICD-10-CM

## 2023-06-28 DIAGNOSIS — K91.2 POSTSURGICAL MALABSORPTION: ICD-10-CM

## 2023-06-28 DIAGNOSIS — E03.8 HYPOTHYROIDISM DUE TO HASHIMOTO'S THYROIDITIS: ICD-10-CM

## 2023-06-28 DIAGNOSIS — Z48.815 ENCOUNTER FOR SURGICAL AFTERCARE FOLLOWING SURGERY OF DIGESTIVE SYSTEM: Primary | ICD-10-CM

## 2023-06-28 PROCEDURE — 99214 OFFICE O/P EST MOD 30 MIN: CPT | Performed by: PHYSICIAN ASSISTANT

## 2023-06-28 NOTE — Clinical Note
Edgardo Gillespie, would you mind reaching out to her if she is unable to afford a visit to offer her resources for therapy and counseling?  Thank you so much!!

## 2023-06-28 NOTE — PROGRESS NOTES
Assessment/Plan:    Encounter for surgical aftercare following surgery of digestive system  -s/p Vertical Sleeve Gastrectomy at [place], [place] in 2013  Work on [address]  [employer] and lifestyle changes and recommend consult with surgical RD and LCSW  Initial: 185lbs  Current: 175lbs  Randy: 135lbs at 1 year s/p surgery  Current BMI is Body mass index is 32 01 kg/m²  · Tolerating a regular diet-yes  · Eating at least 60 grams of protein per day-no  · Following 30/60 minute rule with liquids-yes  · Drinking at least 64 ounces of fluid per day-no  · Drinking carbonated beverages-no  · Sufficient exercise-limited  · Using NSAIDs regularly-no  · Using nicotine-no  · Using alcohol-no   Advised about the risks of alcohol s/p bariatric surgery and recommend avoiding all alcohol      Postsurgical malabsorption  -At risk for malabsorption of vitamins/minerals secondary to malabsorption and restriction of intake from bariatric surgery  -NOT Currently taking adequate postop bariatric surgery vitamin supplementation: OTC MVI, calcium , Vitamin D, biotin, folic acid - unsure of amounts - needs to review with RD    -Last set of bariatric labs completed on 05/23/23 - vitamins WNL    -Next set of bariatric labs ordered for approximately 1 year  -Patient received education about the importance of adhering to a lifelong supplementation regimen to avoid vitamin/mineral deficiencies       Hypothyroidism due to Hashimoto's thyroiditis  -on levothyroxine  -Continue monitoring and management with prescribing provider      Seronegative rheumatoid arthritis ([facility] [address] )  -on methotrexate, prednisone prn, takes motrin prn  -Reviewed absolute contraindication to taking NSAIDS s/p RYGB    Mixed hyperlipidemia  -elevated LDL - continue f/u with PCP  -Avoid fried foods and trans fat, limit saturated fats and refined carbohydrates  -Increase fish/omega 3 FA consumption  -Increase physical activity      GERD (gastroesophageal reflux disease)  -On omeprazole 40mg daily   -Advised avoidance of food triggers and gastric irritants, follow anti-reflux diet and lifestyle measures  -EGD to assess her esophagus and r/o Becker's      Epigastric pain  -Stress vs poor dentition effecting chewing/swallowing large pieces of food vs MU vs esophageal dysmotility   -Revert to liquid/puree diet - Increase protein and hydration  -LCSW for emotional support/therapy recommendations  -GI referral - try to coordinate combined Colonoscopy and EGD if possible  -Advised avoidance of food triggers and gastric irritants, follow anti-reflux diet and lifestyle measures  -Continue with omeprazole 40mg daily  -f/u in about 6 weeks - may need to trial peppermint oil vs levsin           Diagnoses and all orders for this visit:    Encounter for surgical aftercare following surgery of digestive system    Postsurgical malabsorption    Hypothyroidism due to Hashimoto's thyroiditis    Seronegative rheumatoid arthritis ([facility] [place] [address] )    Mixed hyperlipidemia    GERD (gastroesophageal reflux disease)  -     Ambulatory referral to Gastroenterology; Future    Colon cancer screening  -     Ambulatory referral to Gastroenterology; Future    Epigastric pain          Subjective:      Patient ID: Ashley Hernandez is a 65 y o  female  -s/p Vertical Sleeve Gastrectomy at [place], [place] in 2013  Presents to the office today for persistent epigastric pain with food  She was seen last month in [facility] and advised to obtain UGI and EGD  Has not obtained EGD yet - scheduled for end of month  About 6 months ago she started having mid-epigastric pain that is worse with food not fluids without any radiation  Feels like a pressure without any associated symptoms  Denies dysphagia or vomiting  Does not notice specific triggers but notices her pain occurring intermittently approximately 30-60 minutes after food intake   She is taking omeprazole 40 mg PO QD prior to her surgery and continued with this after her "surgery  Does get heartburn if she misses a dose  Has not had EGD since surgery  Over the last year she has experienced extreme stress with son in MVA and breaking his neck and going through divorce  Admits to not always eating well  She is missing top dentures - not chewing as well lately  UGI 06/06/23:   \"1  Small to moderate-sized hiatal hernia with mild gastroesophageal reflux  2   Postsurgical changes of prior sleeve gastrectomy with greater than expected capacity of the gastric pouch  \"     Diet Recall:   B - captain crunch and whole milk or eggs and guajardo or scrapple   L - hamburger on bun fast food or home with nothing or 1/2 balogne and cheese sandwich or can spaghettios   D - pizza or fast food    Fluids - SF snapple apple 32oz, few glasses wine with juice a week    The following portions of the patient's history were reviewed and updated as appropriate: allergies, current medications, past family history, past medical history, past social history, past surgical history and problem list     Review of Systems   Constitutional: Negative for chills and fever  Unexpected weight change: planned weight loss  HENT: Negative for trouble swallowing  Respiratory: Negative for cough and shortness of breath  Cardiovascular: Negative for chest pain and palpitations  Gastrointestinal: Positive for abdominal pain  Negative for constipation, diarrhea, nausea and vomiting  Neurological: Negative for dizziness  Psychiatric/Behavioral:        Denies anxiety and depression         Objective:      /72 (BP Location: Right arm, Patient Position: Sitting, Cuff Size: Large) Comment (BP Location): per patient request/lumpectomy on L side  Pulse 79   Resp 16   Ht 5' 2\" (1 575 m)   Wt 79 4 kg (175 lb)   BMI 32 01 kg/m²     Colonoscopy-Overdue       Physical Exam  Vitals reviewed  Constitutional:       General: She is not in acute distress  Appearance: She is well-developed     HENT:      Head: " Normocephalic and atraumatic  Eyes:      General: No scleral icterus  Cardiovascular:      Rate and Rhythm: Normal rate and regular rhythm  Pulmonary:      Effort: Pulmonary effort is normal  No respiratory distress  Abdominal:      General: There is no distension  Palpations: Abdomen is soft  Tenderness: There is no abdominal tenderness  Skin:     General: Skin is warm and dry  Neurological:      Mental Status: She is alert and oriented to person, place, and time     Psychiatric:         Mood and Affect: Mood normal          Behavior: Behavior normal

## 2023-06-28 NOTE — ASSESSMENT & PLAN NOTE
-Stress vs poor dentition effecting chewing/swallowing large pieces of food vs MU vs esophageal dysmotility   -Revert to liquid/puree diet - Increase protein and hydration  -LCSW for emotional support/therapy recommendations  -GI referral - try to coordinate combined Colonoscopy and EGD if possible  -Advised avoidance of food triggers and gastric irritants, follow anti-reflux diet and lifestyle measures  -Continue with omeprazole 40mg daily  -f/u in about 6 weeks - may need to trial peppermint oil vs levsin

## 2023-07-17 ENCOUNTER — TELEPHONE (OUTPATIENT)
Dept: BARIATRICS | Facility: CLINIC | Age: 63
End: 2023-07-17

## 2023-07-17 NOTE — TELEPHONE ENCOUNTER
Pt cancelled her EGD for 7/28 (it was never put on our spreadsheet though).   She will call back to r/s

## 2023-07-23 DIAGNOSIS — F32.1 MODERATE MAJOR DEPRESSION, SINGLE EPISODE (HCC): ICD-10-CM

## 2023-07-24 RX ORDER — BUPROPION HYDROCHLORIDE 150 MG/1
150 TABLET ORAL EVERY MORNING
Qty: 90 TABLET | Refills: 2 | Status: SHIPPED | OUTPATIENT
Start: 2023-07-24

## 2023-07-26 ENCOUNTER — RA CDI HCC (OUTPATIENT)
Dept: OTHER | Facility: HOSPITAL | Age: 63
End: 2023-07-26

## 2023-07-26 NOTE — PROGRESS NOTES
[facility] coding opportunities          Chart Reviewed number of suggestions sent to Provider: 1     Patients Insurance     Medicare Insurance: Medicare        F32.1

## 2023-08-02 ENCOUNTER — OFFICE VISIT (OUTPATIENT)
Dept: FAMILY MEDICINE CLINIC | Facility: CLINIC | Age: 63
End: 2023-08-02
Payer: MEDICARE

## 2023-08-02 VITALS
OXYGEN SATURATION: 98 % | BODY MASS INDEX: 32.39 KG/M2 | SYSTOLIC BLOOD PRESSURE: 102 MMHG | HEIGHT: 62 IN | WEIGHT: 176 LBS | DIASTOLIC BLOOD PRESSURE: 64 MMHG | TEMPERATURE: 96 F | HEART RATE: 80 BPM

## 2023-08-02 DIAGNOSIS — J44.9 CHRONIC OBSTRUCTIVE PULMONARY DISEASE, UNSPECIFIED COPD TYPE (HCC): ICD-10-CM

## 2023-08-02 DIAGNOSIS — F32.1 MAJOR DEPRESSIVE DISORDER, SINGLE EPISODE, MODERATE (HCC): Primary | ICD-10-CM

## 2023-08-02 DIAGNOSIS — Z91.09 ENVIRONMENTAL ALLERGIES: ICD-10-CM

## 2023-08-02 DIAGNOSIS — Z90.3 H/O GASTRIC SLEEVE: ICD-10-CM

## 2023-08-02 PROCEDURE — 99214 OFFICE O/P EST MOD 30 MIN: CPT | Performed by: FAMILY MEDICINE

## 2023-08-02 RX ORDER — ALBUTEROL SULFATE 90 UG/1
2 AEROSOL, METERED RESPIRATORY (INHALATION) EVERY 4 HOURS PRN
Qty: 18 G | Refills: 1 | Status: SHIPPED | OUTPATIENT
Start: 2023-08-02

## 2023-08-02 RX ORDER — PHENTERMINE HYDROCHLORIDE 37.5 MG/1
TABLET ORAL
COMMUNITY

## 2023-08-02 NOTE — PROGRESS NOTES
Name: Alexa Mercer      : 1960      MRN: [id number]  Encounter Provider: Michael Lucia DO  Encounter Date: 2023   Encounter department: [address] [place] [address]     1. Major depressive disorder, single episode, moderate (HCC)  Stable, doing well. 2. Chronic obstructive pulmonary disease, unspecified COPD type (HCC)  -     Ventolin  (90 Base) MCG/ACT inhaler; Inhale 2 puffs every 4 (four) hours as needed for wheezing or shortness of breath    3. Environmental allergies  Continue with PRN antihistamines. 4. H/O gastric sleeve  5. BMI 32.0-32.9,adult  BMI Counseling: Body mass index is 32.19 kg/m². The BMI is above normal. Nutrition recommendations include decreasing portion sizes, encouraging healthy choices of fruits and vegetables, consuming healthier snacks, limiting drinks that contain sugar, moderation in carbohydrate intake, increasing intake of lean protein and reducing intake of cholesterol. Exercise recommendations include moderate physical activity 150 minutes/week and exercising 3-5 times per week. No pharmacotherapy was ordered. Rationale for BMI follow-up plan is due to patient being overweight or obese. Subjective      HPI     Reports that her breathing has improved, she is using her inhaler PRN and this is helpful. Using this 1-2 times per week, states that her son has not been stealing her inhaler so she is able to use it appropriately. Denies coughing or chest tightness. Reports that she was prescribed phentermine about 2.5 years ago, was on this for 6 months. Notes that she lost about about 30 lbs. Notes that she has slowly gained this back. Reports that she feels that his suppresses her appetite. Reports that is helps her stop eating sugar. Has seen weight management with Jason Ortiz, last visit on 23. History of gastric sleeve in 2013.      Review of Systems    Current Outpatient Medications on File Prior to Visit   Medication Sig   • Biotin 1000 MCG tablet Take 1,000 mcg by mouth daily   • buPROPion (WELLBUTRIN XL) 150 mg 24 hr tablet TAKE 1 TABLET BY MOUTH EVERY DAY IN THE MORNING   • Cholecalciferol 25 MCG (1000 UT) tablet Take by mouth   • cyclobenzaprine (FLEXERIL) 5 mg tablet Take 5 mg by mouth 3 (three) times a day as needed   • Diclofenac Epolamine 1.3 % PTCH 2 (two) times a day as needed     • Diclofenac Sodium (VOLTAREN) 1 % APPLY 1 APPLICATION TOPICALLY 4 (FOUR) TIMES A DAY. APPLY TO AFFECTED AREA. • fluticasone (FLONASE) 50 mcg/act nasal spray SPRAY 2 SPRAYS INTO EACH NOSTRIL EVERY DAY   • folic acid (FOLVITE) 1 mg tablet Take 1 tablet by mouth daily except on Wednesdays. • gabapentin (NEURONTIN) 300 mg capsule Take 900 mg by mouth daily Take 300 in AM and 600 in PM    • ibuprofen (MOTRIN) 800 mg tablet Take 800 mg by mouth as needed PRN   • levothyroxine 112 mcg tablet Take 112 mcg by mouth in the morning   • lidocaine-prilocaine (EMLA) cream APPLY TOPICALLY AS NEEDED FOR MILD PAIN (PAIN SCORE 1-3). • meclizine (ANTIVERT) 12.5 MG tablet Take 1 tablet (12.5 mg total) by mouth 3 (three) times a day as needed for dizziness   • methotrexate 2.5 mg tablet Takes 8 pills weekly on wednesday    • mometasone-formoterol (DULERA) 100-5 MCG/ACT inhaler Inhale 2 puffs 2 (two) times a day Rinse mouth after use.    • montelukast (SINGULAIR) 10 mg tablet Take 10 mg by mouth daily   • nystatin-triamcinolone (MYCOLOG-II) cream PRN   • omeprazole (PriLOSEC) 40 MG capsule Take 1 capsule (40 mg total) by mouth daily   • predniSONE 10 mg tablet TAKE 1 TABLET (10 MG TOTAL) BY MOUTH DAILY AS NEEDED (INFLAMMATION)   • SUMAtriptan (Imitrex) 25 mg tablet 1 p.o. at headache onset, may repeat 1 after 2 hours p.r.n.   • traZODone (DESYREL) 50 mg tablet Take 1 tablet (50 mg total) by mouth daily at bedtime   • phentermine (ADIPEX-P) 37.5 MG tablet  (Patient not taking: Reported on 8/2/2023)       Objective     BP 102/64 (BP Location: Left arm, Patient Position: Sitting, Cuff Size: Standard)   Pulse 80   Temp (!) 96 °F (35.6 °C) (Tympanic)   Ht 5' 2" (1.575 m)   Wt 79.8 kg (176 lb)   SpO2 98%   BMI 32.19 kg/m²     Physical Exam  Vitals reviewed. Constitutional:       General: She is not in acute distress. Appearance: Normal appearance. HENT:      Head: Normocephalic and atraumatic. Right Ear: External ear normal.      Left Ear: External ear normal.      Nose: Nose normal.      Mouth/Throat:      Mouth: Mucous membranes are moist.   Eyes:      Extraocular Movements: Extraocular movements intact. Conjunctiva/sclera: Conjunctivae normal.      Pupils: Pupils are equal, round, and reactive to light. Cardiovascular:      Rate and Rhythm: Normal rate and regular rhythm. Heart sounds: Normal heart sounds. Pulmonary:      Effort: Pulmonary effort is normal.      Breath sounds: Normal breath sounds. No wheezing, rhonchi or rales. Abdominal:      General: Abdomen is flat. Bowel sounds are normal. There is no distension. Palpations: Abdomen is soft. Tenderness: There is no abdominal tenderness. Musculoskeletal:         General: No deformity. Cervical back: Neck supple. Right lower leg: No edema. Left lower leg: No edema. Lymphadenopathy:      Cervical: No cervical adenopathy. Skin:     General: Skin is warm. Capillary Refill: Capillary refill takes less than 2 seconds. Findings: No rash. Neurological:      Mental Status: She is alert. Mental status is at baseline.        Vallorie Fail, DO

## 2023-08-09 DIAGNOSIS — Z91.09 ENVIRONMENTAL ALLERGIES: ICD-10-CM

## 2023-08-09 RX ORDER — FEXOFENADINE HCL 180 MG/1
TABLET ORAL
Qty: 30 TABLET | Refills: 2 | Status: SHIPPED | OUTPATIENT
Start: 2023-08-09

## 2023-08-23 DIAGNOSIS — G43.109 MIGRAINE WITH AURA AND WITHOUT STATUS MIGRAINOSUS, NOT INTRACTABLE: ICD-10-CM

## 2023-08-23 DIAGNOSIS — K21.9 GASTROESOPHAGEAL REFLUX DISEASE, UNSPECIFIED WHETHER ESOPHAGITIS PRESENT: ICD-10-CM

## 2023-08-23 DIAGNOSIS — M79.7 MUSCLE PAIN, FIBROMYALGIA: Primary | ICD-10-CM

## 2023-08-23 RX ORDER — SUMATRIPTAN 25 MG/1
TABLET, FILM COATED ORAL
Qty: 9 TABLET | Refills: 0 | Status: CANCELLED | OUTPATIENT
Start: 2023-08-23

## 2023-08-23 RX ORDER — OMEPRAZOLE 40 MG/1
40 CAPSULE, DELAYED RELEASE ORAL DAILY
Qty: 90 CAPSULE | Refills: 0 | Status: SHIPPED | OUTPATIENT
Start: 2023-08-23

## 2023-08-24 RX ORDER — CYCLOBENZAPRINE HCL 5 MG
5 TABLET ORAL 3 TIMES DAILY PRN
Qty: 90 TABLET | Refills: 0 | Status: SHIPPED | OUTPATIENT
Start: 2023-08-24

## 2023-08-30 DIAGNOSIS — M06.00 SERONEGATIVE RHEUMATOID ARTHRITIS (HCC): ICD-10-CM

## 2023-08-31 RX ORDER — PREDNISONE 10 MG/1
10 TABLET ORAL DAILY PRN
Qty: 30 TABLET | Refills: 0 | Status: SHIPPED | OUTPATIENT
Start: 2023-08-31

## 2023-09-03 DIAGNOSIS — F51.01 PRIMARY INSOMNIA: ICD-10-CM

## 2023-09-03 RX ORDER — TRAZODONE HYDROCHLORIDE 50 MG/1
50 TABLET ORAL
Qty: 30 TABLET | Refills: 1 | Status: SHIPPED | OUTPATIENT
Start: 2023-09-03

## 2023-09-12 DIAGNOSIS — J44.9 CHRONIC OBSTRUCTIVE PULMONARY DISEASE, UNSPECIFIED COPD TYPE (HCC): Primary | ICD-10-CM

## 2023-09-12 RX ORDER — MONTELUKAST SODIUM 10 MG/1
10 TABLET ORAL
Qty: 90 TABLET | Refills: 1 | Status: SHIPPED | OUTPATIENT
Start: 2023-09-12 | End: 2023-09-20 | Stop reason: SDUPTHER

## 2023-09-13 DIAGNOSIS — M06.00 SERONEGATIVE RHEUMATOID ARTHRITIS (HCC): ICD-10-CM

## 2023-09-13 DIAGNOSIS — K21.9 GASTROESOPHAGEAL REFLUX DISEASE, UNSPECIFIED WHETHER ESOPHAGITIS PRESENT: ICD-10-CM

## 2023-09-13 RX ORDER — OMEPRAZOLE 40 MG/1
40 CAPSULE, DELAYED RELEASE ORAL DAILY
Qty: 90 CAPSULE | Refills: 0 | Status: SHIPPED | OUTPATIENT
Start: 2023-09-13 | End: 2023-09-20 | Stop reason: SDUPTHER

## 2023-09-14 RX ORDER — PREDNISONE 10 MG/1
10 TABLET ORAL DAILY PRN
Qty: 30 TABLET | Refills: 0 | OUTPATIENT
Start: 2023-09-14

## 2023-09-19 ENCOUNTER — PATIENT MESSAGE (OUTPATIENT)
Dept: FAMILY MEDICINE CLINIC | Facility: CLINIC | Age: 63
End: 2023-09-19

## 2023-09-19 DIAGNOSIS — E03.8 HYPOTHYROIDISM DUE TO HASHIMOTO'S THYROIDITIS: Primary | ICD-10-CM

## 2023-09-19 DIAGNOSIS — J44.9 CHRONIC OBSTRUCTIVE PULMONARY DISEASE, UNSPECIFIED COPD TYPE (HCC): ICD-10-CM

## 2023-09-19 DIAGNOSIS — E06.3 HYPOTHYROIDISM DUE TO HASHIMOTO'S THYROIDITIS: Primary | ICD-10-CM

## 2023-09-19 DIAGNOSIS — Z91.09 ENVIRONMENTAL ALLERGIES: ICD-10-CM

## 2023-09-19 DIAGNOSIS — M79.7 MUSCLE PAIN, FIBROMYALGIA: ICD-10-CM

## 2023-09-19 DIAGNOSIS — F32.1 MODERATE MAJOR DEPRESSION, SINGLE EPISODE (HCC): ICD-10-CM

## 2023-09-19 DIAGNOSIS — F51.01 PRIMARY INSOMNIA: ICD-10-CM

## 2023-09-19 DIAGNOSIS — M79.7 FIBROMYALGIA: ICD-10-CM

## 2023-09-19 DIAGNOSIS — K21.9 GASTROESOPHAGEAL REFLUX DISEASE, UNSPECIFIED WHETHER ESOPHAGITIS PRESENT: ICD-10-CM

## 2023-09-19 DIAGNOSIS — H81.10 BENIGN PAROXYSMAL POSITIONAL VERTIGO, UNSPECIFIED LATERALITY: ICD-10-CM

## 2023-09-19 DIAGNOSIS — R09.82 PND (POST-NASAL DRIP): ICD-10-CM

## 2023-09-20 RX ORDER — FLUTICASONE PROPIONATE 50 MCG
2 SPRAY, SUSPENSION (ML) NASAL DAILY
Qty: 48 ML | Refills: 1 | Status: SHIPPED | OUTPATIENT
Start: 2023-09-20

## 2023-09-20 RX ORDER — LEVOTHYROXINE SODIUM 112 UG/1
112 TABLET ORAL DAILY
Qty: 90 TABLET | Refills: 1 | Status: SHIPPED | OUTPATIENT
Start: 2023-09-20

## 2023-09-20 RX ORDER — BUPROPION HYDROCHLORIDE 150 MG/1
150 TABLET ORAL EVERY MORNING
Qty: 90 TABLET | Refills: 2 | Status: SHIPPED | OUTPATIENT
Start: 2023-09-20

## 2023-09-20 RX ORDER — GABAPENTIN 300 MG/1
900 CAPSULE ORAL DAILY
Qty: 270 CAPSULE | Refills: 1 | Status: SHIPPED | OUTPATIENT
Start: 2023-09-20

## 2023-09-20 RX ORDER — ALBUTEROL SULFATE 90 UG/1
2 AEROSOL, METERED RESPIRATORY (INHALATION) EVERY 4 HOURS PRN
Qty: 18 G | Refills: 1 | Status: SHIPPED | OUTPATIENT
Start: 2023-09-20

## 2023-09-20 RX ORDER — CYCLOBENZAPRINE HCL 5 MG
5 TABLET ORAL 3 TIMES DAILY PRN
Qty: 90 TABLET | Refills: 0 | Status: SHIPPED | OUTPATIENT
Start: 2023-09-20

## 2023-09-20 RX ORDER — MONTELUKAST SODIUM 10 MG/1
10 TABLET ORAL
Qty: 90 TABLET | Refills: 1 | Status: SHIPPED | OUTPATIENT
Start: 2023-09-20

## 2023-09-20 RX ORDER — OMEPRAZOLE 40 MG/1
40 CAPSULE, DELAYED RELEASE ORAL DAILY
Qty: 90 CAPSULE | Refills: 1 | Status: SHIPPED | OUTPATIENT
Start: 2023-09-20

## 2023-09-20 RX ORDER — MECLIZINE HCL 12.5 MG/1
12.5 TABLET ORAL 3 TIMES DAILY PRN
Qty: 30 TABLET | Refills: 0 | Status: SHIPPED | OUTPATIENT
Start: 2023-09-20

## 2023-09-20 RX ORDER — TRAZODONE HYDROCHLORIDE 50 MG/1
50 TABLET ORAL
Qty: 90 TABLET | Refills: 1 | Status: SHIPPED | OUTPATIENT
Start: 2023-09-20

## 2023-11-01 DIAGNOSIS — M79.7 MUSCLE PAIN, FIBROMYALGIA: Primary | ICD-10-CM

## 2023-11-01 DIAGNOSIS — J44.9 CHRONIC OBSTRUCTIVE PULMONARY DISEASE, UNSPECIFIED COPD TYPE (HCC): ICD-10-CM

## 2023-11-01 DIAGNOSIS — K21.9 GASTROESOPHAGEAL REFLUX DISEASE, UNSPECIFIED WHETHER ESOPHAGITIS PRESENT: ICD-10-CM

## 2023-11-01 RX ORDER — OMEPRAZOLE 40 MG/1
40 CAPSULE, DELAYED RELEASE ORAL DAILY
Qty: 90 CAPSULE | Refills: 1 | Status: SHIPPED | OUTPATIENT
Start: 2023-11-01

## 2023-11-01 RX ORDER — LIDOCAINE AND PRILOCAINE 25; 25 MG/G; MG/G
CREAM TOPICAL AS NEEDED
Qty: 30 G | Refills: 1 | Status: SHIPPED | OUTPATIENT
Start: 2023-11-01

## 2023-11-01 RX ORDER — MONTELUKAST SODIUM 10 MG/1
10 TABLET ORAL
Qty: 90 TABLET | Refills: 1 | Status: SHIPPED | OUTPATIENT
Start: 2023-11-01

## 2023-11-01 NOTE — TELEPHONE ENCOUNTER
Returned message - Spoke w pt - pt left a vm earlier on our 1619's office line - [facility]ts address and pharmacy address updated in pts chart - pt will be establishing with New Provider soon - in the meantime, pt requesting  refill of attached meds  to hold her until (until she sees a new provider)    Pt requesting Medical Records Transfer -SL FORM (blank) for MRR emailed to pts email with instructions.      Pt uses [id number] - [address], [place] - [facility] [address]       Please advise

## 2023-11-20 ENCOUNTER — TELEPHONE (OUTPATIENT)
Dept: FAMILY MEDICINE CLINIC | Facility: CLINIC | Age: 63
End: 2023-11-20

## 2023-11-20 NOTE — TELEPHONE ENCOUNTER
ADMISSION NOTE    Patient: Sadi Kline  : 2022 9:11 PM  MRN: [id number]  Referring Physician:  Information for the patient's mother:  Bob Kline [[id number]]   Rola Ratliff DO      Information for the patient's mother:  Bob Kline [[id number]]   Aguila Limon MD     Date of admission: 2022      Gestational Age: 37w5d   PMA: 38w2d   Day of Life on Admission (Day of birth being DOL 1): 5        REASON for admission: [facility] HOSPITAL: [facility]    MATERNAL/PRENATAL HISTORY    Information for the patient's mother:  Bob Kilne [[id number]]   1990      Information for the patient's mother:  Bob Kline [[id number]]   30 year old     Information for the patient's mother:  Bob Kline [[id number]]         Information for the patient's mother:  Bob Kline [[id number]]   37w5d      Information for the patient's mother:  Bob Kline [[id number]]   No JOYCE on file.     Information for the patient's mother:  Bob Kline [[id number]]     OB History    Para Term  AB Living   3 2 2   1 2   SAB IAB Ectopic Molar Multiple Live Births           0 2      # Outcome Date GA Lbr Juan Jose/2nd Weight Sex Delivery Anes PTL Lv   3 Term 22 37w5d 02:23 / 00:46 2880 g M Vag-Spont EPI N ROSANA   2 Term      Vag-Spont  N ROSANA   1 AB 2007     ELECTIVE ABO           Ethnicity: Black    Maternal labs:    Information for the patient's mother:  Bob Kline [[id number]]     Lab Results   Component Value Date/Time    CULT MANY STREPTOCOCCUS PYOGENES (STREP GROUP A) (P) 10/22/2019 08:12 AM    CULT VERY RARE NORMAL UPPER RESPIRATORY CORY 10/22/2019 08:12 AM    CULT No Arcanobacterium haemolyticum isolated. 10/22/2019 08:12 AM    RPR NR 2022 12:00 AM    RPR NR 2022 12:00 AM    RPR Nonreactive 2021 03:54 PM    RPR NONREACTIVE 10/31/2016 12:36 PM    HIV NR 2022 12:00 AM    HIV NEG 2022 12:00 AM    HIV Nonreactive  Sent pt Medigo message to remind her AWV is due sometime after 12/19. 2021 03:54 PM    HIV NONREACTIVE 10/31/2016 12:36 PM    RUBEL IMMUNE 2022 12:00 AM    GCPT Negative 2022 12:00 AM    GCPT NEGATIVE 10/31/2016 12:36 PM    CHPT Negative 2022 12:00 AM    CHPT NEGATIVE 10/31/2016 12:36 PM        Information for the patient's mother:  Bob Kline [[id number]]     Recent Labs   Lab 22  0000 22  0000 22  0000 22  0000   HIV Antigen/Antibody Screen  --  NR  --  NEG   RPR Screen  --  NR  --  NR   Neisseria gonorrhoeae by Nucleic Acid Amplification  --   --  Negative  --    Chlamydia trachomatis by Nucleic Acid Amplification  --   --  Negative  --    Rubella Antibody IgG  --   --   --  IMMUNE   GBS POS  --   --   --           GBS: positive  Antibiotics: Ampicillin X 2      Prenatal care: Yes   steroids during pregnancy:  , No, Number of Doses: None   Magnesium Sulfate: Yes  Chorioamnionitis: No  Maternal HTN, Chronic or Pregnancy Induced: Yes  Maternal Diabetes: No  Congenital Infection - TORCH / Zika / Parvovirus: No  Maternal COVID-19: negative  Mutiple Gestation: No    Other Pregnancy Complication: PE with severe features      Information for the patient's mother:  Bob Kline [[id number]]   History reviewed. No pertinent past medical history.         Maternal Medication: PNV      DELIVERY/BIRTH HISTORY    Time of birth: 2022 9:11 PM by  Vaginal, Spontaneous  Sex:  male   Information for the patient's mother:  Bob Kline [[id number]]   No JOYCE on file.     GA   Information for the patient's mother:  Bob Kline [[id number]]   37w5d       Labor  · Delivery Method: Vaginal, Spontaneous [250]   · Time of Birth: 9:11 PM  Information for the patient's mother:  Bob Kline [[id number]]   Membranes  Membrane Status: Ruptured  Sac Identifier: Sac 1  Rupture Type: Artificial  Rupture Date: 22  Rupture Time: 1109  Fluid Color: Clear  Fluid Odor: Normal      Delivery Medications: Epidural            APGARS  One minute  Five minutes   Skin color: 1   1     Heart rate: 2   2     Reflex: 2   2     Muscle tone: 2   2     Breathin   2     Totals: 9   9         Laboratory and Radiological Findings:  Invalid input(s): RECENT LABS     Physical Exam:     Body Measurement:     Weight: 2650 g    Head Circumference: 29 cm (11.42\")    Length 17.72\" (45 cm)         Vital Signs:   Current Vitals:  Temp: 98.1 °F (36.7 °C) Heart Rate: 130  Resp: 40  BP: (!) 92/63  SpO2: 100 %      General: Well appearing, no acute distress, responds to stimuli  HENT: AFSOF, normcephalic, ears normally set, no cleft, palate intact  Neck: supple, full range of motion  CV: RRR, no murmurs, 2+ pulses, good perfusion  Lungs: clear and equal bilaterally, no retractions, no nasal flaring  Abdomen: soft, non-tender, non-distended, no organomegaly  Extremities: no focal deficits; FROM x 4  Neuro: good tone  Skin: no rash, jaundiced  : normal for GA; anus patent  Back: no anastasia, no dimples      ASSESSMENT AND PLAN BY SYSTEM:       ACTIVE PROBLEMS:  Principal Problem:    Jaundice      Fluids, electrolytes and nutrition:   Assessment: Hyperbilirubinemia  Plan:   - Initiate feeds with MBM.  - Give 2 feeds of Sim adv to help decreasing bili level  - BMP     Respiratory:  Assessment: No active respiratory issues    Respiratory support: Room Air    Plan:   - Monitor clinically    Cardiovascular:  Assessment: No active cardiac issues    Plan:   - continue cardiorespiratory monitoring      Infectious Disease:  Risk factors for sepsis: Risk factors for sepsis is low    Early onset sepsis screen:  Gestational Age: 37w5d                              Clinical Exam:  Well Appearing (no persistent physiologic abnormalities)    Clinical recommendation:   Well appearing: dominguez: No culture, no antibiotics, routine vitals  Equivocal: dominguez: No culture, no antibiotics, vitals every 4 hours for 24 hours  Clinical illness: dominguez: Empiric antibiotics, vitals per NICU    Plan:   - Blood  culture and CBC on admission - No  - No Antibiotics were initiated due to low risk of Sepsis - But will initiate later, if clinically indicated.    Hyperbilirubin:   Assessment: Hyperbilirubinemia of   Risk factors for hyperbilirubinemia - exclusive breast feeding with inadequate intake    Plan:   - bilirubin q 6 hours  - Intensive phototherapy.    - Wean phototherapy as indicated with bili levels    Hematology:  Assessment: No active hematologic issues  Plan:   - CBC with diff and retic due to hyperbilirubinemia     Neurology:  Assessment: No active neurologic concerns  Plan:   - No active issues      Lines:   - PIV    Social:   - Spoke to Mom in-depth. All questions and concerns addressed. Parents expressed verbal understanding.      Fabricio Cortez MD

## 2023-12-06 ENCOUNTER — TELEPHONE (OUTPATIENT)
Dept: NEUROLOGY | Facility: CLINIC | Age: 63
End: 2023-12-06

## 2023-12-06 NOTE — TELEPHONE ENCOUNTER
Called patient to reschedule appointment for 1/25/2024 . Pt did not answer left voicemail to give us a call back to reschedule .

## 2023-12-22 ENCOUNTER — TELEPHONE (OUTPATIENT)
Dept: FAMILY MEDICINE CLINIC | Facility: CLINIC | Age: 63
End: 2023-12-22

## 2023-12-22 NOTE — TELEPHONE ENCOUNTER
12/22/23 1:42 PM        The office's request has been received, reviewed, and the patient chart updated. The PCP has successfully been removed with a patient attribution note. This message will now be completed.        Thank you  Chriss Mar

## 2023-12-22 NOTE — TELEPHONE ENCOUNTER
Per conversation with pt and office on 11/1/23, pt has moved out of state.  Pts address was updated in EPIC.    Please remove Dr Vasquez as pcp.

## 2023-12-22 NOTE — TELEPHONE ENCOUNTER
----- Message from Helen Vasquez DO sent at 12/22/2023  7:51 AM EST -----  Please call patient to scheduled AWV (okay for virtual) before the end of the year. Okay for 20 minutes. Okay for over lunch.     Helen Vasquez DO  [facility]  12/22/2023 7:52 AM

## 2024-02-19 ENCOUNTER — TELEPHONE (OUTPATIENT)
Dept: BARIATRICS | Facility: CLINIC | Age: 64
End: 2024-02-19

## 2024-05-24 DIAGNOSIS — F51.01 PRIMARY INSOMNIA: ICD-10-CM

## 2024-05-24 RX ORDER — TRAZODONE HYDROCHLORIDE 50 MG/1
50 TABLET ORAL
Qty: 30 TABLET | Refills: 0 | Status: SHIPPED | OUTPATIENT
Start: 2024-05-24

## 2024-06-23 DIAGNOSIS — F51.01 PRIMARY INSOMNIA: ICD-10-CM

## 2024-06-23 RX ORDER — TRAZODONE HYDROCHLORIDE 50 MG/1
50 TABLET ORAL
Qty: 30 TABLET | Refills: 0 | Status: SHIPPED | OUTPATIENT
Start: 2024-06-23

## 2024-07-23 DIAGNOSIS — F51.01 PRIMARY INSOMNIA: ICD-10-CM

## 2024-07-23 RX ORDER — TRAZODONE HYDROCHLORIDE 50 MG/1
50 TABLET ORAL
Qty: 30 TABLET | Refills: 0 | Status: SHIPPED | OUTPATIENT
Start: 2024-07-23

## 2024-07-23 NOTE — TELEPHONE ENCOUNTER
- you are not pts PCP, pt relocated to [place], last office visit on 8/2/2023    The following medication renewals have been approved and sent to the pharmacy       Please  kindly review and advise further steps, Thank You!!!

## 2024-07-23 NOTE — TELEPHONE ENCOUNTER
Patient moved to [place] - no longer a patient here.  Please see patient attribution in chart 12/22/23. Please call pharmacy and cancel refill.

## 2024-10-14 ENCOUNTER — OFFICE VISIT (OUTPATIENT)
Dept: FAMILY MEDICINE CLINIC | Facility: CLINIC | Age: 64
End: 2024-10-14
Payer: MEDICARE

## 2024-10-14 VITALS
TEMPERATURE: 97.6 F | SYSTOLIC BLOOD PRESSURE: 112 MMHG | BODY MASS INDEX: 23.74 KG/M2 | OXYGEN SATURATION: 99 % | DIASTOLIC BLOOD PRESSURE: 62 MMHG | WEIGHT: 134 LBS | HEART RATE: 62 BPM | HEIGHT: 63 IN

## 2024-10-14 DIAGNOSIS — E06.3 HYPOTHYROIDISM DUE TO HASHIMOTO'S THYROIDITIS: ICD-10-CM

## 2024-10-14 DIAGNOSIS — Z13.6 ENCOUNTER FOR LIPID SCREENING FOR CARDIOVASCULAR DISEASE: ICD-10-CM

## 2024-10-14 DIAGNOSIS — Z91.09 ENVIRONMENTAL ALLERGIES: ICD-10-CM

## 2024-10-14 DIAGNOSIS — J44.9 CHRONIC OBSTRUCTIVE PULMONARY DISEASE, UNSPECIFIED COPD TYPE (HCC): ICD-10-CM

## 2024-10-14 DIAGNOSIS — Z13.220 ENCOUNTER FOR LIPID SCREENING FOR CARDIOVASCULAR DISEASE: ICD-10-CM

## 2024-10-14 DIAGNOSIS — Z23 ENCOUNTER FOR IMMUNIZATION: ICD-10-CM

## 2024-10-14 DIAGNOSIS — D17.1 LIPOMA OF TORSO: ICD-10-CM

## 2024-10-14 DIAGNOSIS — Z12.31 BREAST CANCER SCREENING BY MAMMOGRAM: ICD-10-CM

## 2024-10-14 DIAGNOSIS — F32.1 MAJOR DEPRESSIVE DISORDER, SINGLE EPISODE, MODERATE (HCC): ICD-10-CM

## 2024-10-14 DIAGNOSIS — M06.00 SERONEGATIVE RHEUMATOID ARTHRITIS (HCC): ICD-10-CM

## 2024-10-14 DIAGNOSIS — D32.9 MENINGIOMA (HCC): ICD-10-CM

## 2024-10-14 DIAGNOSIS — F32.1 MODERATE MAJOR DEPRESSION, SINGLE EPISODE (HCC): ICD-10-CM

## 2024-10-14 DIAGNOSIS — Z76.89 ENCOUNTER TO ESTABLISH CARE: Primary | ICD-10-CM

## 2024-10-14 PROCEDURE — 99214 OFFICE O/P EST MOD 30 MIN: CPT | Performed by: FAMILY MEDICINE

## 2024-10-14 PROCEDURE — G0008 ADMIN INFLUENZA VIRUS VAC: HCPCS

## 2024-10-14 PROCEDURE — 90673 RIV3 VACCINE NO PRESERV IM: CPT

## 2024-10-14 RX ORDER — BUPROPION HYDROCHLORIDE 300 MG/1
300 TABLET ORAL EVERY MORNING
Qty: 90 TABLET | Refills: 1 | Status: SHIPPED | OUTPATIENT
Start: 2024-10-14

## 2024-10-14 RX ORDER — EPINEPHRINE 0.3 MG/.3ML
0.3 INJECTION SUBCUTANEOUS ONCE
COMMUNITY
Start: 2024-07-16

## 2024-10-14 RX ORDER — FEXOFENADINE HCL 180 MG/1
180 TABLET ORAL DAILY
Qty: 30 TABLET | Refills: 2 | Status: SHIPPED | OUTPATIENT
Start: 2024-10-14

## 2024-10-14 RX ORDER — PHENTERMINE HYDROCHLORIDE 37.5 MG/1
37.5 TABLET ORAL DAILY
Qty: 30 TABLET | Refills: 0 | Status: SHIPPED | OUTPATIENT
Start: 2024-10-14

## 2024-10-14 RX ORDER — BUPROPION HYDROCHLORIDE 300 MG/1
300 TABLET ORAL EVERY MORNING
Qty: 90 TABLET | Refills: 1 | Status: SHIPPED | OUTPATIENT
Start: 2024-10-14 | End: 2024-10-14 | Stop reason: SDUPTHER

## 2024-10-14 NOTE — PROGRESS NOTES
Ambulatory Visit  Name: Grace Renner      : 1960      MRN: [id number]  Encounter Provider: Helen Vasquez DO  Encounter Date: 10/14/2024   Encounter department: [facility] [address]    Assessment & Plan  Breast cancer screening by mammogram    Orders:    Mammo screening bilateral w 3d and cad; Future    Encounter for immunization    Orders:    influenza vaccine, recombinant, PF, 0.5 mL IM (Flublok)    Seronegative rheumatoid arthritis (HCC)  Following with rheum.        Chronic obstructive pulmonary disease, unspecified COPD type ([facility])  Stable, controlled with Dulera, albuterol PRN and Singulair.        Major depressive disorder, single episode, moderate ([facility])  Depression Screening Follow-up Plan: Patient's depression screening was positive with a PHQ-9 score of 7. Patient with underlying depression and was advised to continue current medications as prescribed.         Meningioma ([facility])         Encounter to establish care         BMI 30.0-30.9,adult  Phentermine for 1 month and then will discontinue.   Orders:    buPROPion (WELLBUTRIN XL) 300 mg 24 hr tablet; Take 1 tablet (300 mg total) by mouth every morning    Moderate major depression, single episode (HCC)  Depression Screening Follow-up Plan: Patient's depression screening was positive with a PHQ-9 score of 7. Patient with underlying depression and was advised to continue current medications as prescribed.    Orders:    buPROPion (WELLBUTRIN XL) 300 mg 24 hr tablet; Take 1 tablet (300 mg total) by mouth every morning    Environmental allergies    Orders:    fexofenadine (ALLEGRA) 180 MG tablet; Take 1 tablet (180 mg total) by mouth daily    Hypothyroidism due to Hashimoto's thyroiditis    Orders:    TSH, 3rd generation with Free T4 reflex; Future    Encounter for lipid screening for cardiovascular disease    Orders:    Lipid panel; Future      Depression Screening and Follow-up Plan: Patient's depression  screening was positive with a PHQ-9 score of 7. Patient with underlying depression and was advised to continue current medications as prescribed.         History of Present Illness     HPI  Patient presents to the office to re-establish care.     Notes that she recently moved back from [place]. States that she has lost about 40 lbs in the last 1 year. Notes that she was taking phentermine off and on. Walking more and following a high protein diet. She has gained some weight since moving back.    She is living back with her .   Her son is living in [place], he is drinking heavily. He continues to struggle with addiction. He is living with his father.      Review of Systems  Past Medical History:   Diagnosis Date    Cancer (HCC)     breast    COPD (chronic obstructive pulmonary disease) (HCC)     Disease of thyroid gland     Emphysema lung (HCC)     Fibromyalgia     GERD (gastroesophageal reflux disease)     Hashimoto's thyroiditis     History of breast cancer     RA (rheumatoid arthritis) (HCC)     Rheumatoid arthritis involving multiple sites (HCC)      Past Surgical History:   Procedure Laterality Date    APPENDECTOMY      BREAST LUMPECTOMY Left     CHOLECYSTECTOMY      CYST REMOVAL  2022    GASTRIC BYPASS      ROTATOR CUFF REPAIR Right     SHOULDER SURGERY Right     Muscle tendon transfer     WOUND DEBRIDEMENT N/A 2022    Procedure: EXCISIONAL DEBRIDEMENT OF BACK;  Surgeon: Sheree Mcwilliams MD;  Location: [facility];  Service: General     Family History   Problem Relation Age of Onset    Skin cancer Mother     Alzheimer's disease Mother     Cirrhosis Father     Coronary artery disease Brother     Cirrhosis Brother     Breast cancer Maternal Grandmother      Social History     Tobacco Use    Smoking status: Former     Current packs/day: 0.00     Types: Cigarettes     Start date:      Quit date:      Years since quittin.8    Smokeless tobacco: Never   Vaping Use    Vaping status: Never Used    Substance and Sexual Activity    Alcohol use: Yes     Alcohol/week: 14.0 standard drinks of alcohol     Types: 14 Glasses of wine per week     Comment: a week    Drug use: Never    Sexual activity: Not Currently     Partners: Male     Current Outpatient Medications on File Prior to Visit   Medication Sig    Biotin 1000 MCG tablet Take 1,000 mcg by mouth daily    Cholecalciferol 25 MCG (1000 UT) tablet Take by mouth    cyclobenzaprine (FLEXERIL) 5 mg tablet Take 1 tablet (5 mg total) by mouth 3 (three) times a day as needed (fibromyalgia flare)    EPINEPHrine (EPIPEN) 0.3 mg/0.3 mL SOAJ Inject 0.3 mg into a muscle once    fluticasone (FLONASE) 50 mcg/act nasal spray 2 sprays into each nostril daily    folic acid (FOLVITE) 1 mg tablet Take 1 tablet by mouth daily except on Wednesdays.     gabapentin (NEURONTIN) 300 mg capsule Take 3 capsules (900 mg total) by mouth daily Take 300 in AM and 600 in PM    ibuprofen (MOTRIN) 800 mg tablet Take 800 mg by mouth as needed PRN    levothyroxine 112 mcg tablet Take 1 tablet (112 mcg total) by mouth in the morning    lidocaine-prilocaine (EMLA) cream Apply topically as needed for mild pain    methotrexate 2.5 mg tablet Takes 8 pills weekly on wednesday     mometasone-formoterol (DULERA) 100-5 MCG/ACT inhaler Inhale 2 puffs 2 (two) times a day Rinse mouth after use.    montelukast (SINGULAIR) 10 mg tablet Take 1 tablet (10 mg total) by mouth daily at bedtime    omeprazole (PriLOSEC) 40 MG capsule Take 1 capsule (40 mg total) by mouth daily    phentermine (ADIPEX-P) 37.5 MG tablet     predniSONE 10 mg tablet TAKE 1 TABLET (10 MG TOTAL) BY MOUTH DAILY AS NEEDED (INFLAMMATION)    SUMAtriptan (Imitrex) 25 mg tablet 1 p.o. at headache onset, may repeat 1 after 2 hours p.r.n.    traZODone (DESYREL) 50 mg tablet TAKE 1 TABLET BY MOUTH EVERY DAY AT BEDTIME    [DISCONTINUED] buPROPion (WELLBUTRIN XL) 150 mg 24 hr tablet Take 1 tablet (150 mg total) by mouth every morning     "[DISCONTINUED] fexofenadine (ALLEGRA) 180 MG tablet TAKE 1 TABLET BY MOUTH EVERY DAY    Diclofenac Epolamine 1.3 % PTCH 2 (two) times a day as needed   (Patient not taking: Reported on 10/14/2024)    Diclofenac Sodium (VOLTAREN) 1 % Apply 2 g topically 4 (four) times a day (Patient not taking: Reported on 10/14/2024)    nystatin-triamcinolone (MYCOLOG-II) cream PRN (Patient not taking: Reported on 10/14/2024)    Ventolin  (90 Base) MCG/ACT inhaler Inhale 2 puffs every 4 (four) hours as needed for wheezing or shortness of breath (Patient not taking: Reported on 10/14/2024)    [DISCONTINUED] meclizine (ANTIVERT) 12.5 MG tablet Take 1 tablet (12.5 mg total) by mouth 3 (three) times a day as needed for dizziness     Allergies   Allergen Reactions    Ciprofloxacin Hives    Keflex [Cephalexin] Hives    Penicillins Hives    Sulfa Antibiotics Hives     Immunization History   Administered Date(s) Administered    COVID-19 PFIZER VACCINE 0.3 ML IM 03/19/2021, 04/09/2021, 11/08/2021    INFLUENZA 10/15/2018, 09/23/2019, 10/01/2020, 10/06/2021, 10/23/2021, 11/03/2022    Influenza Quadrivalent 3 years and older 10/15/2018    Influenza Quadrivalent Preservative Free 3 years and older IM 09/23/2019, 10/01/2020    Influenza, recombinant, quadrivalent,injectable, preservative free 11/03/2022    Pneumococcal Conjugate 13-Valent 08/31/2020    Pneumococcal Polysaccharide PPV23 05/28/2019, 11/08/2021    Tdap 05/28/2019     Objective     /62   Pulse 62   Temp 97.6 °F (36.4 °C)   Ht 5' 3\" (1.6 m)   Wt 60.8 kg (134 lb)   SpO2 99%   BMI 23.74 kg/m²     Physical Exam  Vitals reviewed.   Constitutional:       General: She is not in acute distress.     Appearance: Normal appearance.   HENT:      Head: Normocephalic and atraumatic.      Right Ear: External ear normal.      Left Ear: External ear normal.      Nose: Nose normal.      Mouth/Throat:      Mouth: Mucous membranes are moist.   Eyes:      Extraocular Movements: " Extraocular movements intact.      Conjunctiva/sclera: Conjunctivae normal.      Pupils: Pupils are equal, round, and reactive to light.   Cardiovascular:      Rate and Rhythm: Normal rate and regular rhythm.      Heart sounds: Normal heart sounds.   Pulmonary:      Effort: Pulmonary effort is normal.      Breath sounds: Normal breath sounds.   Abdominal:      General: Bowel sounds are normal. There is no distension.      Palpations: Abdomen is soft.      Tenderness: There is no abdominal tenderness.   Musculoskeletal:      Cervical back: Neck supple.      Right lower leg: No edema.      Left lower leg: No edema.   Lymphadenopathy:      Cervical: No cervical adenopathy.   Skin:     General: Skin is warm.      Capillary Refill: Capillary refill takes less than 2 seconds.      Findings: No rash.   Neurological:      Mental Status: She is alert. Mental status is at baseline.         Depression Screening Follow-up Plan: Patient's depression screening was positive with a PHQ-2 score of . Their PHQ-9 score was 7. Patient with underlying depression and was advised to continue current medications as prescribed.    DO Sonu Ceja [facility]  10/14/2024 1:33 PM

## 2024-10-14 NOTE — PATIENT INSTRUCTIONS
"Patient Education     Depression in adults   The Basics   Written by the doctors and editors at [facility]   What is depression? -- Depression is a disorder that makes you sad, but it is different from normal sadness. Depression can make it hard for you to work, study, or do everyday tasks.  What causes depression? -- Depression is caused by problems with chemicals in the brain called \"neurotransmitters.\" Some people might be more likely to have depression if it runs in their family. Other things might also play a role, including hormones, certain health problems, medicines, stress, being mistreated as a child, family problems, and problems with friends or at school or work.  How do I know if I am depressed? -- People with depression feel down most of the time for at least 2 weeks. They also have at least 1 of these 2 symptoms:   They no longer enjoy or care about doing the things that they used to like to do.   They feel sad, down, hopeless, or cranky most of the day, almost every day.  People with depression can also have other symptoms. Examples include:   Changes in your appetite or weight. You might eat too little or too much, or gain or lose weight without trying.   Sleeping too much or too little   Feeling tired or like you have no energy   Feeling guilty, helpless, or like you are worth nothing   Trouble with concentration or memory   Acting restless or have trouble staying still, or moving or speaking more slowly than normal   Repeated thoughts of death or killing yourself  If you think that you might be depressed, see your doctor or nurse. Only someone trained in mental health can tell for sure if you are depressed.  How is depression diagnosed? -- Your doctor or nurse will do a physical exam, ask you questions, and might order tests. Depression can have a big impact on your life. Luckily, depression can be treated, and the sooner treatment is started, the better it works.  Get help right away if you are " "thinking of hurting or killing yourself! -- If you ever feel like you might hurt yourself or someone else, help is available:   In the [place], contact the 125 Suicide & Crisis Lifeline:   To speak to someone, call or text 837.   To talk to someone online, go to www.[employer]rowdf[employer]d.org/chat.   Call your doctor or nurse, and tell them it is urgent.   Call for an ambulance (in the [place] and [place], call 9-1-1).   Go to the emergency department at the nearest hospital.  What are the treatments for depression? -- Your doctor or nurse will work with you to make a treatment plan. Treatment can include:   Helping you learn more about depression   Counseling (with a psychiatrist, psychologist, nurse, or )   Medicines that relieve depression   Creating a plan to limit access to items that you might use to harm yourself   Other treatments that pass magnetic waves or electricity into the brain  In addition to treatment, getting regular physical activity can also help you feel better.  People with depression that is not too severe can get better by taking medicines or talking with a counselor. People with severe depression usually need medicines to get better, and might also need to see a counselor.  Another treatment involves placing a device against the scalp to pass magnetic waves into the brain. This is called \"transcranial magnetic stimulation\" (\"TMS\"). Doctors might suggest TMS if medicines and counseling have not helped.  Some people with severe depression might need a treatment called \"electroconvulsive therapy\" (\"ECT\"). During ECT, doctors pass an electric current through a person's brain in a safe way.  When will I feel better? -- Most treatment options take a little while to start working.   Many people who take medicines start to feel better within 2 weeks, but it might be 4 to 8 weeks before the medicine has its full effect.   Many people who see a counselor start to feel better within a few weeks, but it might " take 8 to 10 weeks to get the greatest benefit.  If the first treatment you try does not help you, tell your doctor or nurse, but do not give up. Some people need to try different treatments or combinations of treatments before they find an approach that works. Your doctor, nurse, or counselor can work with you to find the treatment that is right for you. They can also help you figure out how to cope while you search for the right treatment or are waiting for your treatment to start working.  How do I decide which treatment to have? -- You and your doctor or nurse will need to work together to choose a treatment for you. Medicines might work a little faster than counseling. But medicines can also cause side effects. Plus, some people do not like the idea of taking medicine.  Seeing a counselor involves talking about your feelings. That is also hard for some people.  What if I take medicine for depression and I want to have a baby? -- Some depression medicines can cause problems for an unborn baby. But having untreated depression during pregnancy can also cause problems. If you want to get pregnant, tell your doctor but do not stop taking your medicines. Together, you can plan the safest way for you to have your baby.  It's also important to talk with your doctor if you want to breastfeed after your baby is born. Breastfeeding has lots of benefits for both mother and baby. Some depression medicines are safer than others to use while breastfeeding. But having untreated depression after giving birth can also cause problems, so do not stop taking your medicines. Your doctor can work with you to plan the safest way for you to feed your baby.  All topics are updated as new evidence becomes available and our peer review process is complete.  This topic retrieved from [employer] on: Mar 06, 2024.  Topic 03131 Version 22.0  Release: 32.2.4 - C32.64  © 2024 UpToDate, Inc. and/or its affiliates. All rights reserved.  figure 1:  "Mood disorders caused by problems in the brain     Mooddisorders, such as depression and bipolar disorder, are caused by problems with\"neurotransmitters.\" These are chemicals in the brain that can affect your emotions.Treatments for mood disorders seem to work by changing the levels of certainneurotransmitters.  Graphic 27429 Version 4.0  Consumer Information Use and Disclaimer   Disclaimer: This generalized information is a limited summary of diagnosis, treatment, and/or medication information. It is not meant to be comprehensive and should be used as a tool to help the user understand and/or assess potential diagnostic and treatment options. It does NOT include all information about conditions, treatments, medications, side effects, or risks that may apply to a specific patient. It is not intended to be medical advice or a substitute for the medical advice, diagnosis, or treatment of a health care provider based on the health care provider's examination and assessment of a patient's specific and unique circumstances. Patients must speak with a health care provider for complete information about their health, medical questions, and treatment options, including any risks or benefits regarding use of medications. This information does not endorse any treatments or medications as safe, effective, or approved for treating a specific patient. UpToDate, Inc. and its affiliates disclaim any warranty or liability relating to this information or the use thereof.The use of this information is governed by the Terms of Use, available at https://www.Flashpointuwer.com/en/know/clinical-effectiveness-terms. 2024© UpToDate, Inc. and its affiliates and/or licensors. All rights reserved.  Copyright   © 2024 UpToDate, Inc. and/or its affiliates. All rights reserved.    "

## 2024-10-14 NOTE — ASSESSMENT & PLAN NOTE
Depression Screening Follow-up Plan: Patient's depression screening was positive with a PHQ-9 score of 7. Patient with underlying depression and was advised to continue current medications as prescribed.

## 2024-10-15 ENCOUNTER — TELEPHONE (OUTPATIENT)
Dept: ADMINISTRATIVE | Facility: OTHER | Age: 64
End: 2024-10-15

## 2024-10-15 ENCOUNTER — APPOINTMENT (OUTPATIENT)
Dept: LAB | Facility: HOSPITAL | Age: 64
End: 2024-10-15
Payer: MEDICARE

## 2024-10-15 DIAGNOSIS — E06.3 HYPOTHYROIDISM DUE TO HASHIMOTO'S THYROIDITIS: ICD-10-CM

## 2024-10-15 DIAGNOSIS — M06.00 SERONEGATIVE RHEUMATOID ARTHRITIS (HCC): ICD-10-CM

## 2024-10-15 DIAGNOSIS — Z79.899 ENCOUNTER FOR LONG-TERM (CURRENT) USE OF HIGH-RISK MEDICATION: ICD-10-CM

## 2024-10-15 DIAGNOSIS — Z13.220 ENCOUNTER FOR LIPID SCREENING FOR CARDIOVASCULAR DISEASE: ICD-10-CM

## 2024-10-15 DIAGNOSIS — Z13.6 ENCOUNTER FOR LIPID SCREENING FOR CARDIOVASCULAR DISEASE: ICD-10-CM

## 2024-10-15 LAB
ALBUMIN SERPL BCG-MCNC: 4 G/DL (ref 3.5–5)
ALP SERPL-CCNC: 87 U/L (ref 34–104)
ALT SERPL W P-5'-P-CCNC: 13 U/L (ref 7–52)
ANION GAP SERPL CALCULATED.3IONS-SCNC: 6 MMOL/L (ref 4–13)
AST SERPL W P-5'-P-CCNC: 17 U/L (ref 13–39)
BASOPHILS # BLD AUTO: 0.09 THOUSANDS/ΜL (ref 0–0.1)
BASOPHILS NFR BLD AUTO: 2 % (ref 0–1)
BILIRUB SERPL-MCNC: 0.67 MG/DL (ref 0.2–1)
BUN SERPL-MCNC: 17 MG/DL (ref 5–25)
CALCIUM SERPL-MCNC: 9 MG/DL (ref 8.4–10.2)
CHLORIDE SERPL-SCNC: 104 MMOL/L (ref 96–108)
CO2 SERPL-SCNC: 30 MMOL/L (ref 21–32)
CREAT SERPL-MCNC: 0.88 MG/DL (ref 0.6–1.3)
CRP SERPL QL: 2.2 MG/L
EOSINOPHIL # BLD AUTO: 0.19 THOUSAND/ΜL (ref 0–0.61)
EOSINOPHIL NFR BLD AUTO: 3 % (ref 0–6)
ERYTHROCYTE [DISTWIDTH] IN BLOOD BY AUTOMATED COUNT: 16.3 % (ref 11.6–15.1)
ERYTHROCYTE [SEDIMENTATION RATE] IN BLOOD: 2 MM/HOUR (ref 0–29)
GFR SERPL CREATININE-BSD FRML MDRD: 70 ML/MIN/1.73SQ M
GLUCOSE SERPL-MCNC: 86 MG/DL (ref 65–140)
HCT VFR BLD AUTO: 33.5 % (ref 34.8–46.1)
HGB BLD-MCNC: 10.3 G/DL (ref 11.5–15.4)
IMM GRANULOCYTES # BLD AUTO: 0.01 THOUSAND/UL (ref 0–0.2)
IMM GRANULOCYTES NFR BLD AUTO: 0 % (ref 0–2)
LYMPHOCYTES # BLD AUTO: 1.52 THOUSANDS/ΜL (ref 0.6–4.47)
LYMPHOCYTES NFR BLD AUTO: 27 % (ref 14–44)
MCH RBC QN AUTO: 22.7 PG (ref 26.8–34.3)
MCHC RBC AUTO-ENTMCNC: 30.7 G/DL (ref 31.4–37.4)
MCV RBC AUTO: 74 FL (ref 82–98)
MONOCYTES # BLD AUTO: 0.57 THOUSAND/ΜL (ref 0.17–1.22)
MONOCYTES NFR BLD AUTO: 10 % (ref 4–12)
NEUTROPHILS # BLD AUTO: 3.29 THOUSANDS/ΜL (ref 1.85–7.62)
NEUTS SEG NFR BLD AUTO: 58 % (ref 43–75)
NRBC BLD AUTO-RTO: 0 /100 WBCS
PLATELET # BLD AUTO: 298 THOUSANDS/UL (ref 149–390)
PMV BLD AUTO: 9.9 FL (ref 8.9–12.7)
POTASSIUM SERPL-SCNC: 4.4 MMOL/L (ref 3.5–5.3)
PROT SERPL-MCNC: 6.5 G/DL (ref 6.4–8.4)
RBC # BLD AUTO: 4.54 MILLION/UL (ref 3.81–5.12)
SODIUM SERPL-SCNC: 140 MMOL/L (ref 135–147)
T4 FREE SERPL-MCNC: 4.01 NG/DL (ref 0.61–1.12)
TSH SERPL DL<=0.05 MIU/L-ACNC: 7.58 UIU/ML (ref 0.45–4.5)
WBC # BLD AUTO: 5.67 THOUSAND/UL (ref 4.31–10.16)

## 2024-10-15 PROCEDURE — 86140 C-REACTIVE PROTEIN: CPT

## 2024-10-15 PROCEDURE — 85025 COMPLETE CBC W/AUTO DIFF WBC: CPT

## 2024-10-15 PROCEDURE — 85652 RBC SED RATE AUTOMATED: CPT

## 2024-10-15 PROCEDURE — 80053 COMPREHEN METABOLIC PANEL: CPT

## 2024-10-15 PROCEDURE — 84439 ASSAY OF FREE THYROXINE: CPT

## 2024-10-15 PROCEDURE — 84443 ASSAY THYROID STIM HORMONE: CPT

## 2024-10-15 PROCEDURE — 36415 COLL VENOUS BLD VENIPUNCTURE: CPT

## 2024-10-15 NOTE — LETTER
Procedure Request Form: Medicare Annual Wellness Visit (AWV)      Date Requested: 10/15/24  Patient: Grace Renner  Patient : 1960   Referring Provider: Helen Vasquez DO        Date of Procedure ______________________________       The above patient has informed us that they have completed their   most recent Medicare Annual Wellness Visit (AWV) at your facility. Please complete   this form and attach all corresponding procedure reports/results.    Comments __________________________________________________________  ____________________________________________________________________  ____________________________________________________________________  ____________________________________________________________________    Facility Completing Procedure _________________________________________    Form Completed By (print name) _______________________________________      Signature __________________________________________________________        These reports are needed for  compliance.    Please fax this completed form and a copy of the procedure report to our office located at [address] [place] [address] as soon as possible to Fax [phone] attention Leisa: Phone [phone]    We thank you for your assistance in treating our mutual patient.

## 2024-10-15 NOTE — LETTER
2nd Request          Procedure Request Form: Medicare Annual Wellness Visit (AWV)      Date Requested: 10/18/24  Patient: Grace eRnner  Patient : 1960   Referring Provider: Helen Vasquez, DO        Date of Procedure ______________________________       The above patient has informed us that they have completed their   most recent Medicare Annual Wellness Visit (AWV) at your facility. Please complete   this form and attach all corresponding procedure reports/results.    Comments __________________________________________________________  ____________________________________________________________________  ____________________________________________________________________  ____________________________________________________________________    Facility Completing Procedure _________________________________________    Form Completed By (print name) _______________________________________      Signature __________________________________________________________        These reports are needed for  compliance.    Please fax this completed form and a copy of the procedure report to our office located at [address] [place] [address] as soon as possible to Fax [phone] attention Leisa: Phone [phone]    We thank you for your assistance in treating our mutual patient.

## 2024-10-15 NOTE — TELEPHONE ENCOUNTER
Upon review of the In Basket request and the patient's chart, initial outreach has been made via fax to facility. Please see Contacts section for details.     Thank you  Leisa Knowles MA

## 2024-10-15 NOTE — TELEPHONE ENCOUNTER
----- Message from Elzbieta BRANNON sent at 10/14/2024  2:25 PM EDT -----  Regarding: care gap request AWV  10/14/24 2:25 PM    Hello, our patient attached above has had Medicare AWV completed/performed. Please assist in updating the patient chart by making an External outreach to [facility] by DR Parul Rodgers  facility located in [place]. The date of service is 2024.    Thank you,  Elzbieta PLATA [address] [place] [address]

## 2024-10-18 NOTE — TELEPHONE ENCOUNTER
As a follow-up, a second attempt has been made for outreach via fax to facility. Please see Contacts section for details.    Thank you  Leisa Knowles MA

## 2024-10-23 NOTE — TELEPHONE ENCOUNTER
Upon review of the In Basket request we have found as a result of outreach that patient did not have the requested item(s) completed.     Any additional questions or concerns should be emailed to the Practice Liaisons via the appropriate education email address, please do not reply via In Basket.    Thank you  Leisa Knowles MA   [facility]

## 2024-10-23 NOTE — TELEPHONE ENCOUNTER
As a final attempt, a third outreach has been made via telephone call to facility. Please see Contacts section for details. This encounter will be closed and completed by end of day. Should we receive the requested information because of previous outreach attempts, the requested patient's chart will be updated appropriately.     Thank you  Leisa Knowles MA

## 2024-10-30 DIAGNOSIS — E06.3 HYPOTHYROIDISM DUE TO HASHIMOTO'S THYROIDITIS: Primary | ICD-10-CM

## 2024-11-13 ENCOUNTER — APPOINTMENT (OUTPATIENT)
Dept: LAB | Facility: HOSPITAL | Age: 64
End: 2024-11-13
Payer: MEDICARE

## 2024-11-13 DIAGNOSIS — E06.3 HYPOTHYROIDISM DUE TO HASHIMOTO'S THYROIDITIS: ICD-10-CM

## 2024-11-13 LAB
CHOLEST SERPL-MCNC: 188 MG/DL (ref ?–200)
HDLC SERPL-MCNC: 49 MG/DL
LDLC SERPL CALC-MCNC: 112 MG/DL (ref 0–100)
NONHDLC SERPL-MCNC: 139 MG/DL
T3FREE SERPL-MCNC: 2.98 PG/ML (ref 2.5–3.9)
T4 FREE SERPL-MCNC: 0.98 NG/DL (ref 0.61–1.12)
TRIGL SERPL-MCNC: 134 MG/DL (ref ?–150)
TSH SERPL DL<=0.05 MIU/L-ACNC: 5.74 UIU/ML (ref 0.45–4.5)

## 2024-11-13 PROCEDURE — 84481 FREE ASSAY (FT-3): CPT

## 2024-11-13 PROCEDURE — 84439 ASSAY OF FREE THYROXINE: CPT

## 2024-11-13 PROCEDURE — 36415 COLL VENOUS BLD VENIPUNCTURE: CPT

## 2024-11-13 PROCEDURE — 86376 MICROSOMAL ANTIBODY EACH: CPT

## 2024-11-13 PROCEDURE — 84443 ASSAY THYROID STIM HORMONE: CPT

## 2024-11-13 PROCEDURE — 86800 THYROGLOBULIN ANTIBODY: CPT

## 2024-11-13 PROCEDURE — 80061 LIPID PANEL: CPT

## 2024-11-14 LAB — THYROGLOB AB SERPL-ACNC: <1 IU/ML (ref 0–0.9)

## 2024-11-15 DIAGNOSIS — M79.7 MUSCLE PAIN, FIBROMYALGIA: ICD-10-CM

## 2024-11-15 DIAGNOSIS — F51.01 PRIMARY INSOMNIA: ICD-10-CM

## 2024-11-15 DIAGNOSIS — Z91.09 ENVIRONMENTAL ALLERGIES: ICD-10-CM

## 2024-11-15 DIAGNOSIS — J44.9 CHRONIC OBSTRUCTIVE PULMONARY DISEASE, UNSPECIFIED COPD TYPE (HCC): ICD-10-CM

## 2024-11-15 LAB — THYROPEROXIDASE AB SERPL-ACNC: 13 IU/ML (ref 0–34)

## 2024-11-15 RX ORDER — ALBUTEROL SULFATE 90 UG/1
2 AEROSOL, METERED RESPIRATORY (INHALATION) EVERY 4 HOURS PRN
Qty: 18 G | Refills: 2 | Status: SHIPPED | OUTPATIENT
Start: 2024-11-15

## 2024-11-15 RX ORDER — FEXOFENADINE HCL 180 MG/1
180 TABLET ORAL DAILY
Qty: 30 TABLET | Refills: 5 | Status: SHIPPED | OUTPATIENT
Start: 2024-11-15

## 2024-11-15 RX ORDER — TRAZODONE HYDROCHLORIDE 50 MG/1
50 TABLET, FILM COATED ORAL
Qty: 30 TABLET | Refills: 5 | Status: SHIPPED | OUTPATIENT
Start: 2024-11-15

## 2024-11-15 RX ORDER — LIDOCAINE/PRILOCAINE 2.5 %-2.5%
CREAM (GRAM) TOPICAL AS NEEDED
Qty: 30 G | Refills: 0 | Status: SHIPPED | OUTPATIENT
Start: 2024-11-15

## 2024-11-18 ENCOUNTER — CONSULT (OUTPATIENT)
Dept: SURGERY | Facility: CLINIC | Age: 64
End: 2024-11-18
Payer: MEDICARE

## 2024-11-18 VITALS
HEART RATE: 102 BPM | TEMPERATURE: 97.7 F | OXYGEN SATURATION: 99 % | BODY MASS INDEX: 23.04 KG/M2 | WEIGHT: 130 LBS | DIASTOLIC BLOOD PRESSURE: 72 MMHG | SYSTOLIC BLOOD PRESSURE: 126 MMHG | HEIGHT: 63 IN | RESPIRATION RATE: 18 BRPM

## 2024-11-18 DIAGNOSIS — D17.1 LIPOMA OF TORSO: ICD-10-CM

## 2024-11-18 PROCEDURE — 99213 OFFICE O/P EST LOW 20 MIN: CPT | Performed by: STUDENT IN AN ORGANIZED HEALTH CARE EDUCATION/TRAINING PROGRAM

## 2024-11-18 PROCEDURE — 99203 OFFICE O/P NEW LOW 30 MIN: CPT | Performed by: STUDENT IN AN ORGANIZED HEALTH CARE EDUCATION/TRAINING PROGRAM

## 2024-11-18 RX ORDER — CHLORHEXIDINE GLUCONATE 40 MG/ML
SOLUTION TOPICAL DAILY PRN
OUTPATIENT
Start: 2024-11-18

## 2024-11-18 NOTE — H&P (VIEW-ONLY)
Name: Grace Renner      : 1960      MRN: [id number]  Encounter Provider: Ivan Leung DO  Encounter Date: 2024   Encounter department: [facility]  :  Assessment & Plan  Lipoma of torso  64-year-old female with bilateral torso lipomas  -Notes multiple lipomas throughout her body  -2 areas that are causing her discomfort around her rib angles on the right and left side  -Notes that when these are palpated they cause her pain and discomfort  -Lipoma on right chest wall roughly 1.5 x 1.5 cm, lipoma of left chest wall roughly 4 x 2 cm  -CBC and CMP from 10/15/2024 reviewed  -Primary care physician note from 10/14/2024 reviewed  -Plan for excision of bilateral chest wall lipomas under local anesthesia    All risks, benefits, alternatives of the procedure were discussed at length with the patient.  Risks include bleeding, infection, damage to surrounding structures, recurrence.  All questions were answered to satisfaction.  The patient voiced understanding and signed consent.  Orders:    Ambulatory Referral to General Surgery    Case request operating room: EXCISION  BIOPSY LESION/MASS ABDOMINAL/CHEST WALL x2; Standing        History of Present Illness     HPI  Grace Renner is a 64 y.o. female who presents for evaluation of lipomas.  Patient states that she has fatty growths on multiple places on her body.  She denies ever having any removed.  She notes that recently she has had 2 areas that do cause her discomfort, especially with palpation.  She notes they are at her rib angles and especially are uncomfortable when palpated.  History obtained from: patient    Review of Systems   Constitutional:  Negative for chills, fatigue and fever.   HENT:  Negative for congestion, hearing loss, rhinorrhea and sore throat.    Eyes:  Negative for pain and discharge.   Respiratory:  Negative for cough, chest tightness and shortness of breath.    Cardiovascular:  Negative for  chest pain and palpitations.   Gastrointestinal:  Negative for abdominal pain, constipation, diarrhea, nausea and vomiting.   Endocrine: Negative for cold intolerance and heat intolerance.   Genitourinary:  Negative for difficulty urinating and dysuria.   Musculoskeletal:         Bilateral torso lipomas and discomfort   Skin:  Negative for color change and rash.   Allergic/Immunologic: Negative for environmental allergies and food allergies.   Neurological:  Negative for seizures and headaches.   Hematological:  Does not bruise/bleed easily.   Psychiatric/Behavioral:  Negative for confusion and hallucinations.      Past Medical History   Past Medical History:   Diagnosis Date    Cancer (HCC)     breast    COPD (chronic obstructive pulmonary disease) (HCC)     Disease of thyroid gland     Emphysema lung (HCC)     Fibromyalgia     GERD (gastroesophageal reflux disease)     Hashimoto's thyroiditis     History of breast cancer     RA (rheumatoid arthritis) (HCC)     Rheumatoid arthritis involving multiple sites (HCC)      Past Surgical History:   Procedure Laterality Date    APPENDECTOMY      BREAST LUMPECTOMY Left      SECTION      , ,    CHOLECYSTECTOMY      COLOGUARD (HISTORICAL)      CYST REMOVAL  2022    FOOT SURGERY Left     GASTRIC BYPASS      KNEE SURGERY Left     ROTATOR CUFF REPAIR Right     SHOULDER SURGERY Right     Muscle tendon transfer     WISDOM TOOTH EXTRACTION      WOUND DEBRIDEMENT N/A 2022    Procedure: EXCISIONAL DEBRIDEMENT OF BACK;  Surgeon: Sheree Mcwilliams MD;  Location: [facility] OR;  Service: General     Family History   Problem Relation Age of Onset    Skin cancer Mother     Alzheimer's disease Mother     Cirrhosis Father     Coronary artery disease Brother     Cirrhosis Brother     Breast cancer Maternal Grandmother       reports that she quit smoking about 22 years ago. Her smoking use included cigarettes. She started smoking about 52 years ago. She has been  exposed to tobacco smoke. She has never used smokeless tobacco. She reports current alcohol use of about 14.0 standard drinks of alcohol per week. She reports that she does not use drugs.  Current Outpatient Medications on File Prior to Visit   Medication Sig Dispense Refill    Biotin 1000 MCG tablet Take 1,000 mcg by mouth daily      buPROPion (WELLBUTRIN XL) 300 mg 24 hr tablet Take 1 tablet (300 mg total) by mouth every morning 90 tablet 1    Cholecalciferol 25 MCG (1000 UT) tablet Take by mouth      cyclobenzaprine (FLEXERIL) 5 mg tablet Take 1 tablet (5 mg total) by mouth 3 (three) times a day as needed (fibromyalgia flare) 90 tablet 0    EPINEPHrine (EPIPEN) 0.3 mg/0.3 mL SOAJ Inject 0.3 mg into a muscle once      fexofenadine (ALLEGRA) 180 MG tablet Take 1 tablet (180 mg total) by mouth daily 30 tablet 5    fluticasone (FLONASE) 50 mcg/act nasal spray 2 sprays into each nostril daily 48 mL 1    folic acid (FOLVITE) 1 mg tablet Take 1 tablet by mouth daily except on Wednesdays.       gabapentin (NEURONTIN) 300 mg capsule Take 3 capsules (900 mg total) by mouth daily Take 300 in AM and 600 in  capsule 1    ibuprofen (MOTRIN) 800 mg tablet Take 800 mg by mouth as needed PRN      levothyroxine 112 mcg tablet Take 1 tablet (112 mcg total) by mouth in the morning 90 tablet 1    lidocaine-prilocaine (EMLA) cream Apply topically as needed for mild pain 30 g 0    methotrexate 2.5 mg tablet Takes 8 pills weekly on wednesday       mometasone-formoterol (DULERA) 100-5 MCG/ACT inhaler Inhale 2 puffs 2 (two) times a day Rinse mouth after use. 13 g 5    montelukast (SINGULAIR) 10 mg tablet Take 1 tablet (10 mg total) by mouth daily at bedtime 90 tablet 1    omeprazole (PriLOSEC) 40 MG capsule Take 1 capsule (40 mg total) by mouth daily 90 capsule 1    phentermine (ADIPEX-P) 37.5 MG tablet Take 1 tablet (37.5 mg total) by mouth in the morning 30 tablet 0    predniSONE 10 mg tablet TAKE 1 TABLET (10 MG TOTAL) BY MOUTH  DAILY AS NEEDED (INFLAMMATION) 30 tablet 0    SUMAtriptan (Imitrex) 25 mg tablet 1 p.o. at headache onset, may repeat 1 after 2 hours p.r.n. 9 tablet 5    traZODone (DESYREL) 50 mg tablet Take 1 tablet (50 mg total) by mouth daily at bedtime 30 tablet 5    Ventolin  (90 Base) MCG/ACT inhaler Inhale 2 puffs every 4 (four) hours as needed for wheezing or shortness of breath 18 g 2    Diclofenac Epolamine 1.3 % PTCH 2 (two) times a day as needed   (Patient not taking: Reported on 10/14/2024)      Diclofenac Sodium (VOLTAREN) 1 % Apply 2 g topically 4 (four) times a day (Patient not taking: Reported on 10/14/2024) 100 g 3    nystatin-triamcinolone (MYCOLOG-II) cream PRN (Patient not taking: Reported on 10/14/2024)       No current facility-administered medications on file prior to visit.     Allergies   Allergen Reactions    Ciprofloxacin Hives    Keflex [Cephalexin] Hives    Penicillins Hives    Sulfa Antibiotics Hives      Medical History Reviewed by provider this encounter:  Tobacco  Allergies  Meds  Problems  Med Hx  Surg Hx  Fam Hx     .  Past Medical History   Past Medical History:   Diagnosis Date    Cancer (HCC)     breast    COPD (chronic obstructive pulmonary disease) (HCC)     Disease of thyroid gland     Emphysema lung (HCC)     Fibromyalgia     GERD (gastroesophageal reflux disease)     Hashimoto's thyroiditis     History of breast cancer     RA (rheumatoid arthritis) (HCC)     Rheumatoid arthritis involving multiple sites (HCC)      Past Surgical History:   Procedure Laterality Date    APPENDECTOMY      BREAST LUMPECTOMY Left      SECTION      , ,    CHOLECYSTECTOMY      COLOGUARD (HISTORICAL)      CYST REMOVAL  2022    FOOT SURGERY Left     GASTRIC BYPASS      KNEE SURGERY Left     ROTATOR CUFF REPAIR Right     SHOULDER SURGERY Right     Muscle tendon transfer     WISDOM TOOTH EXTRACTION      WOUND DEBRIDEMENT N/A 2022    Procedure: EXCISIONAL DEBRIDEMENT OF  BACK;  Surgeon: Sheree Mcwilliams MD;  Location: [facility];  Service: General     Family History   Problem Relation Age of Onset    Skin cancer Mother     Alzheimer's disease Mother     Cirrhosis Father     Coronary artery disease Brother     Cirrhosis Brother     Breast cancer Maternal Grandmother       reports that she quit smoking about 22 years ago. Her smoking use included cigarettes. She started smoking about 52 years ago. She has been exposed to tobacco smoke. She has never used smokeless tobacco. She reports current alcohol use of about 14.0 standard drinks of alcohol per week. She reports that she does not use drugs.  Current Outpatient Medications on File Prior to Visit   Medication Sig Dispense Refill    Biotin 1000 MCG tablet Take 1,000 mcg by mouth daily      buPROPion (WELLBUTRIN XL) 300 mg 24 hr tablet Take 1 tablet (300 mg total) by mouth every morning 90 tablet 1    Cholecalciferol 25 MCG (1000 UT) tablet Take by mouth      cyclobenzaprine (FLEXERIL) 5 mg tablet Take 1 tablet (5 mg total) by mouth 3 (three) times a day as needed (fibromyalgia flare) 90 tablet 0    EPINEPHrine (EPIPEN) 0.3 mg/0.3 mL SOAJ Inject 0.3 mg into a muscle once      fexofenadine (ALLEGRA) 180 MG tablet Take 1 tablet (180 mg total) by mouth daily 30 tablet 5    fluticasone (FLONASE) 50 mcg/act nasal spray 2 sprays into each nostril daily 48 mL 1    folic acid (FOLVITE) 1 mg tablet Take 1 tablet by mouth daily except on Wednesdays.       gabapentin (NEURONTIN) 300 mg capsule Take 3 capsules (900 mg total) by mouth daily Take 300 in AM and 600 in  capsule 1    ibuprofen (MOTRIN) 800 mg tablet Take 800 mg by mouth as needed PRN      levothyroxine 112 mcg tablet Take 1 tablet (112 mcg total) by mouth in the morning 90 tablet 1    lidocaine-prilocaine (EMLA) cream Apply topically as needed for mild pain 30 g 0    methotrexate 2.5 mg tablet Takes 8 pills weekly on wednesday       mometasone-formoterol (DULERA) 100-5 MCG/ACT  inhaler Inhale 2 puffs 2 (two) times a day Rinse mouth after use. 13 g 5    montelukast (SINGULAIR) 10 mg tablet Take 1 tablet (10 mg total) by mouth daily at bedtime 90 tablet 1    omeprazole (PriLOSEC) 40 MG capsule Take 1 capsule (40 mg total) by mouth daily 90 capsule 1    phentermine (ADIPEX-P) 37.5 MG tablet Take 1 tablet (37.5 mg total) by mouth in the morning 30 tablet 0    predniSONE 10 mg tablet TAKE 1 TABLET (10 MG TOTAL) BY MOUTH DAILY AS NEEDED (INFLAMMATION) 30 tablet 0    SUMAtriptan (Imitrex) 25 mg tablet 1 p.o. at headache onset, may repeat 1 after 2 hours p.r.n. 9 tablet 5    traZODone (DESYREL) 50 mg tablet Take 1 tablet (50 mg total) by mouth daily at bedtime 30 tablet 5    Ventolin  (90 Base) MCG/ACT inhaler Inhale 2 puffs every 4 (four) hours as needed for wheezing or shortness of breath 18 g 2    Diclofenac Epolamine 1.3 % PTCH 2 (two) times a day as needed   (Patient not taking: Reported on 10/14/2024)      Diclofenac Sodium (VOLTAREN) 1 % Apply 2 g topically 4 (four) times a day (Patient not taking: Reported on 10/14/2024) 100 g 3    nystatin-triamcinolone (MYCOLOG-II) cream PRN (Patient not taking: Reported on 10/14/2024)       No current facility-administered medications on file prior to visit.     Allergies   Allergen Reactions    Ciprofloxacin Hives    Keflex [Cephalexin] Hives    Penicillins Hives    Sulfa Antibiotics Hives      Current Outpatient Medications on File Prior to Visit   Medication Sig Dispense Refill    Biotin 1000 MCG tablet Take 1,000 mcg by mouth daily      buPROPion (WELLBUTRIN XL) 300 mg 24 hr tablet Take 1 tablet (300 mg total) by mouth every morning 90 tablet 1    Cholecalciferol 25 MCG (1000 UT) tablet Take by mouth      cyclobenzaprine (FLEXERIL) 5 mg tablet Take 1 tablet (5 mg total) by mouth 3 (three) times a day as needed (fibromyalgia flare) 90 tablet 0    EPINEPHrine (EPIPEN) 0.3 mg/0.3 mL SOAJ Inject 0.3 mg into a muscle once      fexofenadine  (ALLEGRA) 180 MG tablet Take 1 tablet (180 mg total) by mouth daily 30 tablet 5    fluticasone (FLONASE) 50 mcg/act nasal spray 2 sprays into each nostril daily 48 mL 1    folic acid (FOLVITE) 1 mg tablet Take 1 tablet by mouth daily except on Wednesdays.       gabapentin (NEURONTIN) 300 mg capsule Take 3 capsules (900 mg total) by mouth daily Take 300 in AM and 600 in  capsule 1    ibuprofen (MOTRIN) 800 mg tablet Take 800 mg by mouth as needed PRN      levothyroxine 112 mcg tablet Take 1 tablet (112 mcg total) by mouth in the morning 90 tablet 1    lidocaine-prilocaine (EMLA) cream Apply topically as needed for mild pain 30 g 0    methotrexate 2.5 mg tablet Takes 8 pills weekly on wednesday       mometasone-formoterol (DULERA) 100-5 MCG/ACT inhaler Inhale 2 puffs 2 (two) times a day Rinse mouth after use. 13 g 5    montelukast (SINGULAIR) 10 mg tablet Take 1 tablet (10 mg total) by mouth daily at bedtime 90 tablet 1    omeprazole (PriLOSEC) 40 MG capsule Take 1 capsule (40 mg total) by mouth daily 90 capsule 1    phentermine (ADIPEX-P) 37.5 MG tablet Take 1 tablet (37.5 mg total) by mouth in the morning 30 tablet 0    predniSONE 10 mg tablet TAKE 1 TABLET (10 MG TOTAL) BY MOUTH DAILY AS NEEDED (INFLAMMATION) 30 tablet 0    SUMAtriptan (Imitrex) 25 mg tablet 1 p.o. at headache onset, may repeat 1 after 2 hours p.r.n. 9 tablet 5    traZODone (DESYREL) 50 mg tablet Take 1 tablet (50 mg total) by mouth daily at bedtime 30 tablet 5    Ventolin  (90 Base) MCG/ACT inhaler Inhale 2 puffs every 4 (four) hours as needed for wheezing or shortness of breath 18 g 2    Diclofenac Epolamine 1.3 % PTCH 2 (two) times a day as needed   (Patient not taking: Reported on 10/14/2024)      Diclofenac Sodium (VOLTAREN) 1 % Apply 2 g topically 4 (four) times a day (Patient not taking: Reported on 10/14/2024) 100 g 3    nystatin-triamcinolone (MYCOLOG-II) cream PRN (Patient not taking: Reported on 10/14/2024)       No current  "facility-administered medications on file prior to visit.      Social History     Tobacco Use    Smoking status: Former     Current packs/day: 0.00     Types: Cigarettes     Start date:      Quit date:      Years since quittin.8     Passive exposure: Past    Smokeless tobacco: Never   Vaping Use    Vaping status: Never Used   Substance and Sexual Activity    Alcohol use: Yes     Alcohol/week: 14.0 standard drinks of alcohol     Types: 14 Glasses of wine per week     Comment: a week    Drug use: Never    Sexual activity: Not Currently     Partners: Male        Objective   /72 (BP Location: Right arm, Patient Position: Sitting, Cuff Size: Standard)   Pulse 102   Temp 97.7 °F (36.5 °C)   Resp 18   Ht 5' 3\" (1.6 m)   Wt 59 kg (130 lb)   SpO2 99%   BMI 23.03 kg/m²      Physical Exam  Constitutional:       Appearance: Normal appearance.   HENT:      Head: Normocephalic and atraumatic.      Nose: Nose normal.   Eyes:      General: No scleral icterus.     Conjunctiva/sclera: Conjunctivae normal.   Cardiovascular:      Rate and Rhythm: Regular rhythm. Tachycardia present.      Heart sounds: Normal heart sounds.   Pulmonary:      Effort: Pulmonary effort is normal.      Breath sounds: Normal breath sounds.   Abdominal:      General: There is no distension.   Musculoskeletal:         General: No signs of injury.   Skin:     General: Skin is warm.      Coloration: Skin is not jaundiced.      Comments: Lipoma on right chest wall roughly 1.5 x 1.5 cm, lipoma of left chest wall roughly 4 x 2 cm   Neurological:      General: No focal deficit present.      Mental Status: She is alert and oriented to person, place, and time.   Psychiatric:         Mood and Affect: Mood normal.         Behavior: Behavior normal.           "

## 2024-11-18 NOTE — ASSESSMENT & PLAN NOTE
64-year-old female with bilateral torso lipomas  -Notes multiple lipomas throughout her body  -2 areas that are causing her discomfort around her rib angles on the right and left side  -Notes that when these are palpated they cause her pain and discomfort  -Lipoma on right chest wall roughly 1.5 x 1.5 cm, lipoma of left chest wall roughly 4 x 2 cm  -CBC and CMP from 10/15/2024 reviewed  -Primary care physician note from 10/14/2024 reviewed  -Plan for excision of bilateral chest wall lipomas under local anesthesia    All risks, benefits, alternatives of the procedure were discussed at length with the patient.  Risks include bleeding, infection, damage to surrounding structures, recurrence.  All questions were answered to satisfaction.  The patient voiced understanding and signed consent.  Orders:    Ambulatory Referral to General Surgery    Case request operating room: EXCISION  BIOPSY LESION/MASS ABDOMINAL/CHEST WALL x2; Standing

## 2024-11-21 ENCOUNTER — TELEPHONE (OUTPATIENT)
Dept: ADMINISTRATIVE | Facility: OTHER | Age: 64
End: 2024-11-21

## 2024-11-21 NOTE — LETTER
2nd Request  '      Procedure Request Form: Medicare Annual Wellness Visit (AWV)      Date Requested: 24  Patient: Grace Renner  Patient : 1960   Referring Provider: Helen Vasquez, DO        Date of Procedure ______________________________       The above patient has informed us that they have completed their   most recent Medicare Annual Wellness Visit (AWV) at your facility. Please complete   this form and attach all corresponding procedure reports/results.    Comments __________________________________________________________  ____________________________________________________________________  ____________________________________________________________________  ____________________________________________________________________    Facility Completing Procedure _________________________________________    Form Completed By (print name) _______________________________________      Signature __________________________________________________________        These reports are needed for  compliance.    Please fax this completed form and a copy of the procedure report to our office located at [address] [place] [address] as soon as possible to Fax [phone] attention Leisa: Phone [phone]    We thank you for your assistance in treating our mutual patient.

## 2024-11-21 NOTE — TELEPHONE ENCOUNTER
Upon review of the In Basket request and the patient's chart, initial outreach has been made via fax to facility. Please see Contacts section for details.     Thank you  Leisa Knowles MA      Spontaneous, unlabored and symmetrical

## 2024-11-21 NOTE — LETTER
Procedure Request Form: Medicare Annual Wellness Visit (AWV)      Date Requested: 24  Patient: Grace Renner  Patient : 1960   Referring Provider: Helen Vasquez DO        Date of Procedure ______________________________       The above patient has informed us that they have completed their   most recent Medicare Annual Wellness Visit (AWV) at your facility. Please complete   this form and attach all corresponding procedure reports/results.    Comments __________________________________________________________  ____________________________________________________________________  ____________________________________________________________________  ____________________________________________________________________    Facility Completing Procedure _________________________________________    Form Completed By (print name) _______________________________________      Signature __________________________________________________________        These reports are needed for  compliance.    Please fax this completed form and a copy of the procedure report to our office located at [address] [place] [address] as soon as possible to Fax [phone] attention Leisa: Phone [phone]    We thank you for your assistance in treating our mutual patient.

## 2024-11-21 NOTE — TELEPHONE ENCOUNTER
----- Message from Elzbieta BRANNON sent at 11/19/2024  3:19 PM EST -----  Regarding: care gap request AWV  11/19/24 3:19 PM    Hello, our patient attached above has had Medicare AWV completed/performed. Please assist in updating the patient chart by making an External outreach to [facility] with Dr Elgin Rodgers facility located in [address] [place] [address]. The date of service is 2024.    Thank you,  Elzbieta PLATA [address]

## 2024-11-25 ENCOUNTER — TELEPHONE (OUTPATIENT)
Dept: SURGERY | Facility: CLINIC | Age: 64
End: 2024-11-25

## 2024-11-25 NOTE — TELEPHONE ENCOUNTER
Returned pt call. She r/s surgery as she had a death in the family and needs to attend a . New surgery date is 24

## 2024-11-29 ENCOUNTER — TELEPHONE (OUTPATIENT)
Dept: FAMILY MEDICINE CLINIC | Facility: CLINIC | Age: 64
End: 2024-11-29

## 2024-11-29 NOTE — TELEPHONE ENCOUNTER
Received fax/ form from  [employer] [facility] [employer] [facility]  - as it is likely a scam that has been increasingly more common for Medicare patients.     Put in providers bin / documented.     Dr Vasquez, kindly review and advise if you will / will not be filling it out.     Please shred if not appropriate, TY!

## 2024-12-03 DIAGNOSIS — J44.9 CHRONIC OBSTRUCTIVE PULMONARY DISEASE, UNSPECIFIED COPD TYPE (HCC): ICD-10-CM

## 2024-12-04 RX ORDER — MONTELUKAST SODIUM 10 MG/1
10 TABLET ORAL
Qty: 90 TABLET | Refills: 1 | Status: SHIPPED | OUTPATIENT
Start: 2024-12-04

## 2024-12-05 NOTE — TELEPHONE ENCOUNTER
Reta from [employer] called for update. Advised that that forms will not be returned. She expressed understanding.

## 2024-12-08 DIAGNOSIS — F51.01 PRIMARY INSOMNIA: ICD-10-CM

## 2024-12-09 RX ORDER — TRAZODONE HYDROCHLORIDE 50 MG/1
50 TABLET, FILM COATED ORAL
Qty: 90 TABLET | Refills: 1 | Status: SHIPPED | OUTPATIENT
Start: 2024-12-09

## 2024-12-11 ENCOUNTER — HOSPITAL ENCOUNTER (OUTPATIENT)
Facility: HOSPITAL | Age: 64
Setting detail: OUTPATIENT SURGERY
Discharge: HOME | End: 2024-12-11
Attending: STUDENT IN AN ORGANIZED HEALTH CARE EDUCATION/TRAINING PROGRAM | Admitting: STUDENT IN AN ORGANIZED HEALTH CARE EDUCATION/TRAINING PROGRAM
Payer: MEDICARE

## 2024-12-11 VITALS
HEART RATE: 75 BPM | OXYGEN SATURATION: 98 % | TEMPERATURE: 98 F | BODY MASS INDEX: 23.2 KG/M2 | WEIGHT: 130.95 LBS | RESPIRATION RATE: 15 BRPM | HEIGHT: 63 IN | DIASTOLIC BLOOD PRESSURE: 53 MMHG | SYSTOLIC BLOOD PRESSURE: 100 MMHG

## 2024-12-11 DIAGNOSIS — D17.1 LIPOMA OF TORSO: ICD-10-CM

## 2024-12-11 PROCEDURE — 12032 INTMD RPR S/A/T/EXT 2.6-7.5: CPT | Performed by: STUDENT IN AN ORGANIZED HEALTH CARE EDUCATION/TRAINING PROGRAM

## 2024-12-11 PROCEDURE — 11402 EXC TR-EXT B9+MARG 1.1-2 CM: CPT | Performed by: STUDENT IN AN ORGANIZED HEALTH CARE EDUCATION/TRAINING PROGRAM

## 2024-12-11 PROCEDURE — 11404 EXC TR-EXT B9+MARG 3.1-4 CM: CPT | Performed by: STUDENT IN AN ORGANIZED HEALTH CARE EDUCATION/TRAINING PROGRAM

## 2024-12-11 PROCEDURE — 88304 TISSUE EXAM BY PATHOLOGIST: CPT | Performed by: PATHOLOGY

## 2024-12-11 RX ORDER — ACETAMINOPHEN 325 MG/1
650 TABLET ORAL EVERY 6 HOURS PRN
Status: DISCONTINUED | OUTPATIENT
Start: 2024-12-11 | End: 2024-12-11 | Stop reason: HOSPADM

## 2024-12-11 RX ORDER — CHLORHEXIDINE GLUCONATE 40 MG/ML
SOLUTION TOPICAL DAILY PRN
Status: DISCONTINUED | OUTPATIENT
Start: 2024-12-11 | End: 2024-12-11 | Stop reason: HOSPADM

## 2024-12-11 RX ORDER — MAGNESIUM HYDROXIDE 1200 MG/15ML
LIQUID ORAL AS NEEDED
Status: DISCONTINUED | OUTPATIENT
Start: 2024-12-11 | End: 2024-12-11 | Stop reason: HOSPADM

## 2024-12-11 RX ORDER — CLINDAMYCIN PHOSPHATE 900 MG/50ML
900 INJECTION, SOLUTION INTRAVENOUS ONCE
Status: COMPLETED | OUTPATIENT
Start: 2024-12-11 | End: 2024-12-11

## 2024-12-11 RX ADMIN — CLINDAMYCIN PHOSPHATE 900 MG: 900 INJECTION, SOLUTION INTRAVENOUS at 08:46

## 2024-12-11 NOTE — OP NOTE
OPERATIVE REPORT  PATIENT NAME: Grace Renner    :  1960  MRN: [id number]  Pt Location: [facility] OR ROOM 03    SURGERY DATE: 2024    Surgeons and Role:     * Ivan Leung DO - Primary    Preop Diagnosis:  Lipoma of torso [D17.1]    Post-Op Diagnosis Codes:     * Lipoma of torso [D17.1]    Procedure(s):  Bilateral - EXCISION  BIOPSY LESION/MASS ABDOMINAL/CHEST WALL x2    Specimen(s):  ID Type Source Tests Collected by Time Destination   1 : Right Chest Mass Tissue Chest Wall TISSUE EXAM Ivan Tamayo Xochitl,  2024 0914    2 : Left Chest Mass Tissue Chest Wall TISSUE EXAM Ivan Roni Leung, DO 2024 0913        Estimated Blood Loss:   Minimal    Drains:  * No LDAs found *    Anesthesia Type:   Local    Operative Indications:  Lipoma of torso [D17.1]    Operative Findings:  Left chest wall mass 4 x 3.5 x 1 cm, incision closure 3 cm  Right chest mass 2 x 2 x 0.5 cm, incision closure 2 cm  Both specimens were excised with grossly negative margins, no measurable margins were taken  For both specimens dissection was carried down into the subcutaneous tissue and did not go any deeper    Complications:   None    Procedure and Technique:  The patient was seen again in Preoperative Holding.  All the risks, benefits, complications, treatment options, and expected outcomes were discussed with the patient and family at length. All questions were answered to satisfaction. There was concurrence with the proposed plan and informed consent was obtained. The site of surgery was properly noted/marked. The patient was taken to Operating Room, identified, and the procedure verified as excision of soft tissue mass bilateral chest wall.    The patient was placed in the supine position on the stretcher.  The patient had received preoperative antibiotics and SCDs placed on the bilateral lower extremities.     The bilateral lower chest wall was then prepped and draped in the usual sterile fashion using  ChloraPrep.  A Time-Out was then performed with all involved present confirming the correct patient, procedure, antibiotics, and any additional concerns.     Using 1% lidocaine with epinephrine both areas were anesthetized.  Attention was taken to the left side at the rib angle.  Using a #15 blade a transverse incision was made over the mass.  Dissection was carried down into subcutaneous tissue.  The mass was identified and removed.  The cavity was inspected and hemostasis was ensured with electrocautery.  The cavity was then irrigated.  Interrupted 3-0 Vicryl was used to approximate the deep dermal tissue.  Running 4-0 Monocryl was used to close the skin.  Attention was taken to the right side at the rib angle.  Using a #15 blade a transverse incision was made over the mass.  Dissection was carried down into subcutaneous tissue.  The mass was identified and removed.  The cavity was inspected and hemostasis was ensured with electrocautery.  The cavity was then irrigated.  Interrupted 3-0 Vicryl was used to approximate the deep dermal tissue.  Running 4-0 Monocryl was used to close the skin.    The incisions were then cleansed and dried and Steri-Strips, 2 x 2, Tegaderm was placed as dressing.    All instrument, sponge, and needle counts were noted to be correct at the conclusion of the case.     I was present for the entire procedure. and A qualified resident physician was not available.    Patient Disposition:  PACU  and hemodynamically stable    SIGNATURE: Ivan Leung DO  DATE: December 11, 2024  TIME: 9:37 AM

## 2024-12-11 NOTE — INTERVAL H&P NOTE
H&P reviewed. After examining the patient I find no changes in the patients condition since the H&P had been written.    Vitals:    12/11/24 0818   BP: 97/60   Pulse: 86   Resp: 14   Temp: 98 °F (36.7 °C)   SpO2: 99%

## 2024-12-16 PROCEDURE — 88304 TISSUE EXAM BY PATHOLOGIST: CPT | Performed by: PATHOLOGY

## 2024-12-30 ENCOUNTER — OFFICE VISIT (OUTPATIENT)
Dept: SURGERY | Facility: CLINIC | Age: 64
End: 2024-12-30

## 2024-12-30 VITALS
BODY MASS INDEX: 23.39 KG/M2 | HEART RATE: 66 BPM | WEIGHT: 132 LBS | HEIGHT: 63 IN | DIASTOLIC BLOOD PRESSURE: 64 MMHG | OXYGEN SATURATION: 99 % | SYSTOLIC BLOOD PRESSURE: 102 MMHG | TEMPERATURE: 98.3 F

## 2024-12-30 DIAGNOSIS — D17.1 LIPOMA OF TORSO: Primary | ICD-10-CM

## 2024-12-30 PROCEDURE — 99024 POSTOP FOLLOW-UP VISIT: CPT | Performed by: STUDENT IN AN ORGANIZED HEALTH CARE EDUCATION/TRAINING PROGRAM

## 2024-12-30 NOTE — PROGRESS NOTES
Name: Grace Renner      : 1960      MRN: [id number]  Encounter Provider: Ivan Leung DO  Encounter Date: 2024   Encounter department: [facility]  :  Assessment & Plan  Lipoma of torso  64-year-old female status post excision of bilateral lipomas of torso on 2024  -Patient denies any pain  -Incisions are healing well without erythema induration or drainage  -Pathology report reviewed  -Follow-up in office as needed    Final Diagnosis   A. Soft Tissue, Right Chest Mass:  - Mature adipose tissue, compatible with lipoma.     B. Soft Tissue, Left Chest Mass:  - Mature adipose tissue, compatible with lipoma.   - Focal fat necrosis.     I reviewed the pathology report and discussed the findings with the patient and their accompanying family or caregiver, if present. All questions were answered to satisfaction and the patient along with family or caregiver, if present, voiced understanding.             History of Present Illness   HPI  Grace Renner is a 64 y.o. female who presents for evaluation status post excision of bilateral torso lipomas.  She denies any pain.  History obtained from: patient    Review of Systems   Constitutional:  Negative for chills, fatigue and fever.   HENT:  Negative for congestion, hearing loss, rhinorrhea and sore throat.    Eyes:  Negative for pain and discharge.   Respiratory:  Negative for cough, chest tightness and shortness of breath.    Cardiovascular:  Negative for chest pain and palpitations.   Gastrointestinal:  Negative for abdominal pain, constipation, diarrhea, nausea and vomiting.   Endocrine: Negative for cold intolerance and heat intolerance.   Genitourinary:  Negative for difficulty urinating and dysuria.   Musculoskeletal:  Negative for back pain and neck pain.   Skin:  Negative for color change and rash.   Allergic/Immunologic: Negative for environmental allergies and food allergies.   Neurological:  Negative for  seizures and headaches.   Hematological:  Does not bruise/bleed easily.   Psychiatric/Behavioral:  Negative for confusion and hallucinations.      Medical History Reviewed by provider this encounter:  Tobacco  Allergies  Meds  Problems  Med Hx  Surg Hx  Fam Hx     .  Past Medical History   Past Medical History:   Diagnosis Date    Cancer (HCC)     breast    COPD (chronic obstructive pulmonary disease) (HCC)     Disease of thyroid gland     Emphysema lung (HCC)     Fibromyalgia     GERD (gastroesophageal reflux disease)     Hashimoto's thyroiditis     History of breast cancer     RA (rheumatoid arthritis) (HCC)     Rheumatoid arthritis involving multiple sites (HCC)      Past Surgical History:   Procedure Laterality Date    APPENDECTOMY      BREAST LUMPECTOMY Left      SECTION      , ,    CHEST WALL BIOPSY Bilateral 2024    Procedure: EXCISION  BIOPSY LESION/MASS ABDOMINAL/CHEST WALL x2;  Surgeon: Ivan Leung DO;  Location: [facility];  Service: General    CHOLECYSTECTOMY      COLOGUARD (HISTORICAL)      CYST REMOVAL  2022    FOOT SURGERY Left     GASTRIC BYPASS      KNEE SURGERY Left     ROTATOR CUFF REPAIR Right     SHOULDER SURGERY Right     Muscle tendon transfer     WISDOM TOOTH EXTRACTION      WOUND DEBRIDEMENT N/A 2022    Procedure: EXCISIONAL DEBRIDEMENT OF BACK;  Surgeon: Sheree Mcwilliams MD;  Location: [facility];  Service: General     Family History   Problem Relation Age of Onset    Skin cancer Mother     Alzheimer's disease Mother     Cirrhosis Father     Coronary artery disease Brother     Cirrhosis Brother     Breast cancer Maternal Grandmother       reports that she quit smoking about 23 years ago. Her smoking use included cigarettes. She started smoking about 53 years ago. She has been exposed to tobacco smoke. She has never used smokeless tobacco. She reports current alcohol use of about 14.0 standard drinks of alcohol per week. She reports  that she does not use drugs.  Current Outpatient Medications on File Prior to Visit   Medication Sig Dispense Refill    Biotin 1000 MCG tablet Take 1,000 mcg by mouth daily      buPROPion (WELLBUTRIN XL) 300 mg 24 hr tablet Take 1 tablet (300 mg total) by mouth every morning 90 tablet 1    Cholecalciferol 25 MCG (1000 UT) tablet Take by mouth      cyclobenzaprine (FLEXERIL) 5 mg tablet Take 1 tablet (5 mg total) by mouth 3 (three) times a day as needed (fibromyalgia flare) 90 tablet 0    EPINEPHrine (EPIPEN) 0.3 mg/0.3 mL SOAJ Inject 0.3 mg into a muscle once      fexofenadine (ALLEGRA) 180 MG tablet Take 1 tablet (180 mg total) by mouth daily 30 tablet 5    fluticasone (FLONASE) 50 mcg/act nasal spray 2 sprays into each nostril daily 48 mL 1    folic acid (FOLVITE) 1 mg tablet Take 1 tablet by mouth daily except on Wednesdays.       gabapentin (NEURONTIN) 300 mg capsule Take 3 capsules (900 mg total) by mouth daily Take 300 in AM and 600 in  capsule 1    ibuprofen (MOTRIN) 800 mg tablet Take 800 mg by mouth as needed PRN      levothyroxine 112 mcg tablet Take 1 tablet (112 mcg total) by mouth in the morning 90 tablet 1    lidocaine-prilocaine (EMLA) cream Apply topically as needed for mild pain 30 g 0    methotrexate 2.5 mg tablet Takes 8 pills weekly on wednesday       mometasone-formoterol (DULERA) 100-5 MCG/ACT inhaler Inhale 2 puffs 2 (two) times a day Rinse mouth after use. 13 g 5    montelukast (SINGULAIR) 10 mg tablet Take 1 tablet (10 mg total) by mouth daily at bedtime 90 tablet 1    omeprazole (PriLOSEC) 40 MG capsule Take 1 capsule (40 mg total) by mouth daily 90 capsule 1    phentermine (ADIPEX-P) 37.5 MG tablet Take 1 tablet (37.5 mg total) by mouth in the morning 30 tablet 0    predniSONE 10 mg tablet TAKE 1 TABLET (10 MG TOTAL) BY MOUTH DAILY AS NEEDED (INFLAMMATION) 30 tablet 0    SUMAtriptan (Imitrex) 25 mg tablet 1 p.o. at headache onset, may repeat 1 after 2 hours p.r.n. 9 tablet 5     traZODone (DESYREL) 50 mg tablet TAKE 1 TABLET BY MOUTH DAILY AT BEDTIME 90 tablet 1    Ventolin  (90 Base) MCG/ACT inhaler Inhale 2 puffs every 4 (four) hours as needed for wheezing or shortness of breath 18 g 2    Diclofenac Epolamine 1.3 % PTCH 2 (two) times a day as needed   (Patient not taking: Reported on 10/14/2024)      Diclofenac Sodium (VOLTAREN) 1 % Apply 2 g topically 4 (four) times a day (Patient not taking: Reported on 10/14/2024) 100 g 3    nystatin-triamcinolone (MYCOLOG-II) cream PRN (Patient not taking: Reported on 12/30/2024)       No current facility-administered medications on file prior to visit.     Allergies   Allergen Reactions    Ciprofloxacin Hives    Keflex [Cephalexin] Hives    Penicillins Hives    Sulfa Antibiotics Hives      Current Outpatient Medications on File Prior to Visit   Medication Sig Dispense Refill    Biotin 1000 MCG tablet Take 1,000 mcg by mouth daily      buPROPion (WELLBUTRIN XL) 300 mg 24 hr tablet Take 1 tablet (300 mg total) by mouth every morning 90 tablet 1    Cholecalciferol 25 MCG (1000 UT) tablet Take by mouth      cyclobenzaprine (FLEXERIL) 5 mg tablet Take 1 tablet (5 mg total) by mouth 3 (three) times a day as needed (fibromyalgia flare) 90 tablet 0    EPINEPHrine (EPIPEN) 0.3 mg/0.3 mL SOAJ Inject 0.3 mg into a muscle once      fexofenadine (ALLEGRA) 180 MG tablet Take 1 tablet (180 mg total) by mouth daily 30 tablet 5    fluticasone (FLONASE) 50 mcg/act nasal spray 2 sprays into each nostril daily 48 mL 1    folic acid (FOLVITE) 1 mg tablet Take 1 tablet by mouth daily except on Wednesdays.       gabapentin (NEURONTIN) 300 mg capsule Take 3 capsules (900 mg total) by mouth daily Take 300 in AM and 600 in  capsule 1    ibuprofen (MOTRIN) 800 mg tablet Take 800 mg by mouth as needed PRN      levothyroxine 112 mcg tablet Take 1 tablet (112 mcg total) by mouth in the morning 90 tablet 1    lidocaine-prilocaine (EMLA) cream Apply topically as needed  for mild pain 30 g 0    methotrexate 2.5 mg tablet Takes 8 pills weekly on wednesday       mometasone-formoterol (DULERA) 100-5 MCG/ACT inhaler Inhale 2 puffs 2 (two) times a day Rinse mouth after use. 13 g 5    montelukast (SINGULAIR) 10 mg tablet Take 1 tablet (10 mg total) by mouth daily at bedtime 90 tablet 1    omeprazole (PriLOSEC) 40 MG capsule Take 1 capsule (40 mg total) by mouth daily 90 capsule 1    phentermine (ADIPEX-P) 37.5 MG tablet Take 1 tablet (37.5 mg total) by mouth in the morning 30 tablet 0    predniSONE 10 mg tablet TAKE 1 TABLET (10 MG TOTAL) BY MOUTH DAILY AS NEEDED (INFLAMMATION) 30 tablet 0    SUMAtriptan (Imitrex) 25 mg tablet 1 p.o. at headache onset, may repeat 1 after 2 hours p.r.n. 9 tablet 5    traZODone (DESYREL) 50 mg tablet TAKE 1 TABLET BY MOUTH DAILY AT BEDTIME 90 tablet 1    Ventolin  (90 Base) MCG/ACT inhaler Inhale 2 puffs every 4 (four) hours as needed for wheezing or shortness of breath 18 g 2    Diclofenac Epolamine 1.3 % PTCH 2 (two) times a day as needed   (Patient not taking: Reported on 10/14/2024)      Diclofenac Sodium (VOLTAREN) 1 % Apply 2 g topically 4 (four) times a day (Patient not taking: Reported on 10/14/2024) 100 g 3    nystatin-triamcinolone (MYCOLOG-II) cream PRN (Patient not taking: Reported on 2024)       No current facility-administered medications on file prior to visit.      Social History     Tobacco Use    Smoking status: Former     Current packs/day: 0.00     Types: Cigarettes     Start date:      Quit date:      Years since quittin.0     Passive exposure: Past    Smokeless tobacco: Never   Vaping Use    Vaping status: Never Used   Substance and Sexual Activity    Alcohol use: Yes     Alcohol/week: 14.0 standard drinks of alcohol     Types: 14 Glasses of wine per week     Comment: a week    Drug use: Never    Sexual activity: Not Currently     Partners: Male        Objective   /64 (Patient Position: Sitting, Cuff  "Size: Standard)   Pulse 66   Temp 98.3 °F (36.8 °C) (Temporal)   Ht 5' 3\" (1.6 m)   Wt 59.9 kg (132 lb)   SpO2 99%   BMI 23.38 kg/m²      Physical Exam  Constitutional:       Appearance: Normal appearance.   HENT:      Head: Normocephalic and atraumatic.      Nose: Nose normal.   Eyes:      General: No scleral icterus.     Conjunctiva/sclera: Conjunctivae normal.   Cardiovascular:      Rate and Rhythm: Normal rate.   Pulmonary:      Effort: Pulmonary effort is normal.   Musculoskeletal:         General: No signs of injury.   Skin:     General: Skin is warm.      Coloration: Skin is not jaundiced.      Comments: Incisions healing well without erythema induration or drainage   Neurological:      General: No focal deficit present.      Mental Status: She is alert and oriented to person, place, and time.   Psychiatric:         Mood and Affect: Mood normal.         Behavior: Behavior normal.           "

## 2024-12-30 NOTE — ASSESSMENT & PLAN NOTE
64-year-old female status post excision of bilateral lipomas of torso on 12/11/2024  -Patient denies any pain  -Incisions are healing well without erythema induration or drainage  -Pathology report reviewed  -Follow-up in office as needed    Final Diagnosis   A. Soft Tissue, Right Chest Mass:  - Mature adipose tissue, compatible with lipoma.     B. Soft Tissue, Left Chest Mass:  - Mature adipose tissue, compatible with lipoma.   - Focal fat necrosis.     I reviewed the pathology report and discussed the findings with the patient and their accompanying family or caregiver, if present. All questions were answered to satisfaction and the patient along with family or caregiver, if present, voiced understanding.

## 2025-01-05 ENCOUNTER — RA CDI HCC (OUTPATIENT)
Dept: OTHER | Facility: HOSPITAL | Age: 65
End: 2025-01-05

## 2025-01-13 ENCOUNTER — OFFICE VISIT (OUTPATIENT)
Dept: FAMILY MEDICINE CLINIC | Facility: CLINIC | Age: 65
End: 2025-01-13
Payer: MEDICARE

## 2025-01-13 VITALS
WEIGHT: 136 LBS | RESPIRATION RATE: 16 BRPM | HEIGHT: 63 IN | SYSTOLIC BLOOD PRESSURE: 98 MMHG | OXYGEN SATURATION: 98 % | BODY MASS INDEX: 24.1 KG/M2 | DIASTOLIC BLOOD PRESSURE: 58 MMHG | HEART RATE: 79 BPM

## 2025-01-13 DIAGNOSIS — E06.3 HYPOTHYROIDISM DUE TO HASHIMOTO'S THYROIDITIS: ICD-10-CM

## 2025-01-13 DIAGNOSIS — K21.9 GASTROESOPHAGEAL REFLUX DISEASE, UNSPECIFIED WHETHER ESOPHAGITIS PRESENT: ICD-10-CM

## 2025-01-13 DIAGNOSIS — J32.9 CHRONIC SINUSITIS, UNSPECIFIED LOCATION: ICD-10-CM

## 2025-01-13 DIAGNOSIS — F32.1 MAJOR DEPRESSIVE DISORDER, SINGLE EPISODE, MODERATE (HCC): ICD-10-CM

## 2025-01-13 DIAGNOSIS — Z00.00 MEDICARE ANNUAL WELLNESS VISIT, SUBSEQUENT: Primary | ICD-10-CM

## 2025-01-13 DIAGNOSIS — M06.00 SERONEGATIVE RHEUMATOID ARTHRITIS (HCC): ICD-10-CM

## 2025-01-13 DIAGNOSIS — F41.1 GAD (GENERALIZED ANXIETY DISORDER): ICD-10-CM

## 2025-01-13 DIAGNOSIS — J44.9 CHRONIC OBSTRUCTIVE PULMONARY DISEASE, UNSPECIFIED COPD TYPE (HCC): ICD-10-CM

## 2025-01-13 DIAGNOSIS — D32.9 MENINGIOMA (HCC): ICD-10-CM

## 2025-01-13 PROCEDURE — 99214 OFFICE O/P EST MOD 30 MIN: CPT | Performed by: FAMILY MEDICINE

## 2025-01-13 PROCEDURE — G0438 PPPS, INITIAL VISIT: HCPCS | Performed by: FAMILY MEDICINE

## 2025-01-13 RX ORDER — OMEPRAZOLE 40 MG/1
40 CAPSULE, DELAYED RELEASE ORAL DAILY
Qty: 90 CAPSULE | Refills: 1 | Status: SHIPPED | OUTPATIENT
Start: 2025-01-13

## 2025-01-13 RX ORDER — BUSPIRONE HYDROCHLORIDE 5 MG/1
5 TABLET ORAL 2 TIMES DAILY
Qty: 60 TABLET | Refills: 5 | Status: SHIPPED | OUTPATIENT
Start: 2025-01-13

## 2025-01-13 NOTE — PATIENT INSTRUCTIONS
Medicare Preventive Visit Patient Instructions  Thank you for completing your Welcome to Medicare Visit or Medicare Annual Wellness Visit today. Your next wellness visit will be due in one year (1/14/2026).  The screening/preventive services that you may require over the next 5-10 years are detailed below. Some tests may not apply to you based off risk factors and/or age. Screening tests ordered at today's visit but not completed yet may show as past due. Also, please note that scanned in results may not display below.  Preventive Screenings:  Service Recommendations Previous Testing/Comments   Colorectal Cancer Screening  * Colonoscopy    * Fecal Occult Blood Test (FOBT)/Fecal Immunochemical Test (FIT)  * Fecal DNA/Cologuard Test  * Flexible Sigmoidoscopy Age: 45-75 years old   Colonoscopy: every 10 years (may be performed more frequently if at higher risk)  OR  FOBT/FIT: every 1 year  OR  Cologuard: every 3 years  OR  Sigmoidoscopy: every 5 years  Screening may be recommended earlier than age 45 if at higher risk for colorectal cancer. Also, an individualized decision between you and your healthcare provider will decide whether screening between the ages of 76-85 would be appropriate. Colonoscopy: Not on file  FOBT/FIT: Not on file  Cologuard: 02/01/2023  Sigmoidoscopy: Not on file    Screening Current     Breast Cancer Screening Age: 40+ years old  Frequency: every 1-2 years  Not required if history of left and right mastectomy Mammogram: 10/21/2024    History Breast Cancer   Cervical Cancer Screening Between the ages of 21-29, pap smear recommended once every 3 years.   Between the ages of 30-65, can perform pap smear with HPV co-testing every 5 years.   Recommendations may differ for women with a history of total hysterectomy, cervical cancer, or abnormal pap smears in past. Pap Smear: 03/27/2023    Screening Current   Hepatitis C Screening Once for adults born between 1945 and 1965  More frequently in patients  at high risk for Hepatitis C Hep C Antibody: Not on file        Diabetes Screening 1-2 times per year if you're at risk for diabetes or have pre-diabetes Fasting glucose: 90 mg/dL (5/23/2023)  A1C: 5.3 % (4/7/2023)  Screening Current   Cholesterol Screening Once every 5 years if you don't have a lipid disorder. May order more often based on risk factors. Lipid panel: 11/13/2024    Screening Not Indicated  History Lipid Disorder     Other Preventive Screenings Covered by Medicare:  Abdominal Aortic Aneurysm (AAA) Screening: covered once if your at risk. You're considered to be at risk if you have a family history of AAA.  Lung Cancer Screening: covers low dose CT scan once per year if you meet all of the following conditions: (1) Age 55-77; (2) No signs or symptoms of lung cancer; (3) Current smoker or have quit smoking within the last 15 years; (4) You have a tobacco smoking history of at least 20 pack years (packs per day multiplied by number of years you smoked); (5) You get a written order from a healthcare provider.  Glaucoma Screening: covered annually if you're considered high risk: (1) You have diabetes OR (2) Family history of glaucoma OR (3)  aged 50 and older OR (4)  American aged 65 and older  Osteoporosis Screening: covered every 2 years if you meet one of the following conditions: (1) You're estrogen deficient and at risk for osteoporosis based off medical history and other findings; (2) Have a vertebral abnormality; (3) On glucocorticoid therapy for more than 3 months; (4) Have primary hyperparathyroidism; (5) On osteoporosis medications and need to assess response to drug therapy.   Last bone density test (DXA Scan): 02/10/2021.  HIV Screening: covered annually if you're between the age of 15-65. Also covered annually if you are younger than 15 and older than 65 with risk factors for HIV infection. For pregnant patients, it is covered up to 3 times per  pregnancy.    Immunizations:  Immunization Recommendations   Influenza Vaccine Annual influenza vaccination during flu season is recommended for all persons aged >= 6 months who do not have contraindications   Pneumococcal Vaccine   * Pneumococcal conjugate vaccine = PCV13 (Prevnar 13), PCV15 (Vaxneuvance), PCV20 (Prevnar 20)  * Pneumococcal polysaccharide vaccine = PPSV23 (Pneumovax) Adults 19-65 yo with certain risk factors or if 65+ yo  If never received any pneumonia vaccine: recommend Prevnar 20 (PCV20)  Give PCV20 if previously received 1 dose of PCV13 or PPSV23   Hepatitis B Vaccine 3 dose series if at intermediate or high risk (ex: diabetes, end stage renal disease, liver disease)   Respiratory syncytial virus (RSV) Vaccine - COVERED BY MEDICARE PART D  * RSVPreF3 (Arexvy) CDC recommends that adults 60 years of age and older may receive a single dose of RSV vaccine using shared clinical decision-making (SCDM)   Tetanus (Td) Vaccine - COST NOT COVERED BY MEDICARE PART B Following completion of primary series, a booster dose should be given every 10 years to maintain immunity against tetanus. Td may also be given as tetanus wound prophylaxis.   Tdap Vaccine - COST NOT COVERED BY MEDICARE PART B Recommended at least once for all adults. For pregnant patients, recommended with each pregnancy.   Shingles Vaccine (Shingrix) - COST NOT COVERED BY MEDICARE PART B  2 shot series recommended in those 19 years and older who have or will have weakened immune systems or those 50 years and older     Health Maintenance Due:      Topic Date Due   • Hepatitis C Screening  Never done   • HIV Screening  Never done   • Breast Cancer Screening: Mammogram  10/21/2025   • Colorectal Cancer Screening  02/01/2026   • Cervical Cancer Screening  03/22/2027     Immunizations Due:      Topic Date Due   • COVID-19 Vaccine (4 - 2024-25 season) 09/01/2024     Advance Directives   What are advance directives?  Advance directives are legal  documents that state your wishes and plans for medical care. These plans are made ahead of time in case you lose your ability to make decisions for yourself. Advance directives can apply to any medical decision, such as the treatments you want, and if you want to donate organs.   What are the types of advance directives?  There are many types of advance directives, and each state has rules about how to use them. You may choose a combination of any of the following:  Living will:  This is a written record of the treatment you want. You can also choose which treatments you do not want, which to limit, and which to stop at a certain time. This includes surgery, medicine, IV fluid, and tube feedings.   Durable power of  for healthcare (DPAHC):  This is a written record that states who you want to make healthcare choices for you when you are unable to make them for yourself. This person, called a proxy, is usually a family member or a friend. You may choose more than 1 proxy.  Do not resuscitate (DNR) order:  A DNR order is used in case your heart stops beating or you stop breathing. It is a request not to have certain forms of treatment, such as CPR. A DNR order may be included in other types of advance directives.  Medical directive:  This covers the care that you want if you are in a coma, near death, or unable to make decisions for yourself. You can list the treatments you want for each condition. Treatment may include pain medicine, surgery, blood transfusions, dialysis, IV or tube feedings, and a ventilator (breathing machine).  Values history:  This document has questions about your views, beliefs, and how you feel and think about life. This information can help others choose the care that you would choose.  Why are advance directives important?  An advance directive helps you control your care. Although spoken wishes may be used, it is better to have your wishes written down. Spoken wishes can be  misunderstood, or not followed. Treatments may be given even if you do not want them. An advance directive may make it easier for your family to make difficult choices about your care.       © Copyright [employer] 2018 Information is for End User's use only and may not be sold, redistributed or otherwise used for commercial purposes. All illustrations and images included in CareNotes® are the copyrighted property of [employer] or [employer]

## 2025-01-13 NOTE — PROGRESS NOTES
Name: Grace Renner      : 1960      MRN: [id number]  Encounter Provider: Helen Vasquez DO  Encounter Date: 2025   Encounter department: [facility] [address]    Assessment & Plan  Gastroesophageal reflux disease, unspecified whether esophagitis present    Orders:  •  omeprazole (PriLOSEC) 40 MG capsule; Take 1 capsule (40 mg total) by mouth daily    Seronegative rheumatoid arthritis (HCC)         Chronic obstructive pulmonary disease, unspecified COPD type (Formerly [facility])         Major depressive disorder, single episode, moderate (Formerly [facility])  Depression Screening Follow-up Plan: Patient's depression screening was positive with a PHQ-9 score of 7.        Meningioma (Formerly [facility])         Medicare annual wellness visit, subsequent         KARTIK (generalized anxiety disorder)    Orders:  •  busPIRone (BUSPAR) 5 mg tablet; Take 1 tablet (5 mg total) by mouth 2 (two) times a day    Chronic sinusitis, unspecified location    Orders:  •  Ambulatory Referral to Otolaryngology; Future    Hypothyroidism due to Hashimoto's thyroiditis    Orders:  •  TSH, 3rd generation with Free T4 reflex; Future      Depression Screening and Follow-up Plan: Patient's depression screening was positive with a PHQ-9 score of 7.   Patient with underlying depression and was advised to continue current medications as prescribed.     Preventive health issues were discussed with patient, and age appropriate screening tests were ordered as noted in patient's After Visit Summary. Personalized health advice and appropriate referrals for health education or preventive services given if needed, as noted in patient's After Visit Summary.    History of Present Illness     HPI   Patient Care Team:  Helen Vasquez DO as PCP - General (Family Medicine)  Ivan Leung DO (General Surgery)    PHQ-2/9 Depression Screening    Little interest or pleasure in doing things: 1 - several days  Feeling down, depressed, or  hopeless: 1 - several days  Trouble falling or staying asleep, or sleeping too much: 3 - nearly every day  Feeling tired or having little energy: 0 - not at all  Poor appetite or overeatin - several days  Feeling bad about yourself - or that you are a failure or have let yourself or your family down: 1 - several days  Trouble concentrating on things, such as reading the newspaper or watching television: 0 - not at all  Moving or speaking so slowly that other people could have noticed. Or the opposite - being so fidgety or restless that you have been moving around a lot more than usual: 0 - not at all  Thoughts that you would be better off dead, or of hurting yourself in some way: 0 - not at all  PHQ-9 Score: 7  PHQ-9 Interpretation: Mild depression       GAD7 of 9 today.     Reports that she continues to have PND and sinus issues. Using Flonase and taking allegra and singulair. States that this combination seems to help mildly but not much.     Review of Systems  Medical History Reviewed by provider this encounter:  Tobacco  Allergies  Meds  Problems  Med Hx  Surg Hx  Fam Hx  Soc   Hx      Annual Wellness Visit Questionnaire   Grace is here for her Subsequent Wellness visit.     Health Risk Assessment:   Patient rates overall health as good. Patient feels that their physical health rating is same. Patient is satisfied with their life. Eyesight was rated as same. Hearing was rated as same. Patient feels that their emotional and mental health rating is slightly worse. Patients states they are never, rarely angry. Patient states they are often unusually tired/fatigued. Pain experienced in the last 7 days has been some. Patient's pain rating has been 4/10. Patient states that she has experienced no weight loss or gain in last 6 months.     Depression Screening:   PHQ-9 Score: 7      Fall Risk Screening:   In the past year, patient has experienced: no history of falling in past year      Urinary Incontinence  Screening:   Patient has not leaked urine accidently in the last six months.     Home Safety:  Patient does not have trouble with stairs inside or outside of their home. Patient has working smoke alarms and has working carbon monoxide detector. Home safety hazards include: none.     Nutrition:   Current diet is Regular.     Medications:   Patient is not currently taking any over-the-counter supplements. Patient is able to manage medications.     Activities of Daily Living (ADLs)/Instrumental Activities of Daily Living (IADLs):   Walk and transfer into and out of bed and chair?: Yes  Dress and groom yourself?: Yes    Bathe or shower yourself?: Yes    Feed yourself? Yes  Do your laundry/housekeeping?: Yes  Manage your money, pay your bills and track your expenses?: Yes  Make your own meals?: Yes    Do your own shopping?: Yes    Previous Hospitalizations:   Any hospitalizations or ED visits within the last 12 months?: Yes    How many hospitalizations have you had in the last year?: 1-2    Advance Care Planning:   Living will: No    Durable POA for healthcare: Yes    Advanced directive: Yes      Cognitive Screening:   Provider or family/friend/caregiver concerned regarding cognition?: No    PREVENTIVE SCREENINGS      Cardiovascular Screening:    General: Screening Not Indicated and History Lipid Disorder      Diabetes Screening:     General: Screening Current      Colorectal Cancer Screening:     General: Screening Current      Breast Cancer Screening:     General: History Breast Cancer      Cervical Cancer Screening:    General: Screening Current      Osteoporosis Screening:    General: Patient Declines      Abdominal Aortic Aneurysm (AAA) Screening:        General: Screening Not Indicated      Lung Cancer Screening:     General: Screening Not Indicated    Screening, Brief Intervention, and Referral to Treatment (SBIRT)    Screening  Typical number of drinks in a day: 0  Typical number of drinks in a week:  "0  Interpretation: Low risk drinking behavior.    Single Item Drug Screening:  How often have you used an illegal drug (including marijuana) or a prescription medication for non-medical reasons in the past year? never    Single Item Drug Screen Score: 0  Interpretation: Negative screen for possible drug use disorder    Social Drivers of Health     Financial Resource Strain: Low Risk  (12/19/2022)    Overall Financial Resource Strain (CARDIA)    • Difficulty of Paying Living Expenses: Not hard at all   Food Insecurity: No Food Insecurity (1/13/2025)    Hunger Vital Sign    • Worried About Running Out of Food in the Last Year: Never true    • Ran Out of Food in the Last Year: Never true   Transportation Needs: No Transportation Needs (1/13/2025)    PRAPARE - Transportation    • Lack of Transportation (Medical): No    • Lack of Transportation (Non-Medical): No   Housing Stability: Low Risk  (1/13/2025)    Housing Stability Vital Sign    • Unable to Pay for Housing in the Last Year: No    • Number of Times Moved in the Last Year: 1    • Homeless in the Last Year: No   Utilities: Not At Risk (1/13/2025)    [facility] Utilities    • Threatened with loss of utilities: No     No results found.    Objective   BP 98/58 (BP Location: Left arm, Patient Position: Sitting, Cuff Size: Standard)   Pulse 79   Resp 16   Ht 5' 3\" (1.6 m)   Wt 61.7 kg (136 lb)   SpO2 98%   BMI 24.09 kg/m²     Physical Exam  Vitals reviewed.   Constitutional:       General: She is not in acute distress.     Appearance: Normal appearance.   HENT:      Head: Normocephalic and atraumatic.      Right Ear: External ear normal.      Left Ear: External ear normal.      Nose: Nose normal.      Mouth/Throat:      Mouth: Mucous membranes are moist.   Eyes:      Extraocular Movements: Extraocular movements intact.      Conjunctiva/sclera: Conjunctivae normal.      Pupils: Pupils are equal, round, and reactive to light.   Cardiovascular:      Rate and Rhythm: Normal " rate and regular rhythm.      Heart sounds: Normal heart sounds.   Pulmonary:      Effort: Pulmonary effort is normal.      Breath sounds: Normal breath sounds.   Abdominal:      General: Bowel sounds are normal. There is no distension.      Palpations: Abdomen is soft.      Tenderness: There is no abdominal tenderness.   Musculoskeletal:      Cervical back: Neck supple.      Right lower leg: No edema.      Left lower leg: No edema.   Lymphadenopathy:      Cervical: No cervical adenopathy.   Skin:     General: Skin is warm.      Capillary Refill: Capillary refill takes less than 2 seconds.      Findings: No rash.   Neurological:      Mental Status: She is alert. Mental status is at baseline.           DO [facility]  1/13/2025 2:55 PM

## 2025-01-13 NOTE — ASSESSMENT & PLAN NOTE
Depression Screening Follow-up Plan: Patient's depression screening was positive with a PHQ-9 score of 7.

## 2025-02-02 DIAGNOSIS — E06.3 HYPOTHYROIDISM DUE TO HASHIMOTO'S THYROIDITIS: ICD-10-CM

## 2025-02-02 DIAGNOSIS — Z91.09 ENVIRONMENTAL ALLERGIES: ICD-10-CM

## 2025-02-03 RX ORDER — FEXOFENADINE HCL 180 MG/1
180 TABLET ORAL DAILY
Qty: 90 TABLET | Refills: 1 | Status: SHIPPED | OUTPATIENT
Start: 2025-02-03

## 2025-02-03 RX ORDER — LEVOTHYROXINE SODIUM 112 UG/1
112 TABLET ORAL DAILY
Qty: 90 TABLET | Refills: 1 | Status: SHIPPED | OUTPATIENT
Start: 2025-02-03

## 2025-02-04 DIAGNOSIS — F41.1 GAD (GENERALIZED ANXIETY DISORDER): ICD-10-CM

## 2025-02-05 RX ORDER — BUSPIRONE HYDROCHLORIDE 5 MG/1
5 TABLET ORAL 2 TIMES DAILY
Qty: 180 TABLET | Refills: 2 | Status: SHIPPED | OUTPATIENT
Start: 2025-02-05

## 2025-02-24 ENCOUNTER — OFFICE VISIT (OUTPATIENT)
Dept: FAMILY MEDICINE CLINIC | Facility: CLINIC | Age: 65
End: 2025-02-24
Payer: MEDICARE

## 2025-02-24 VITALS
HEIGHT: 63 IN | BODY MASS INDEX: 24.1 KG/M2 | SYSTOLIC BLOOD PRESSURE: 112 MMHG | DIASTOLIC BLOOD PRESSURE: 74 MMHG | RESPIRATION RATE: 18 BRPM | OXYGEN SATURATION: 98 % | HEART RATE: 74 BPM | WEIGHT: 136 LBS

## 2025-02-24 DIAGNOSIS — M25.551 CHRONIC RIGHT HIP PAIN: ICD-10-CM

## 2025-02-24 DIAGNOSIS — M43.06 LUMBAR SPONDYLOLYSIS: Primary | ICD-10-CM

## 2025-02-24 DIAGNOSIS — G89.29 CHRONIC RIGHT HIP PAIN: ICD-10-CM

## 2025-02-24 DIAGNOSIS — B37.2 CANDIDIASIS OF SKIN: ICD-10-CM

## 2025-02-24 PROCEDURE — 99214 OFFICE O/P EST MOD 30 MIN: CPT | Performed by: FAMILY MEDICINE

## 2025-02-24 PROCEDURE — G2211 COMPLEX E/M VISIT ADD ON: HCPCS | Performed by: FAMILY MEDICINE

## 2025-02-24 RX ORDER — NYSTATIN 100000 [USP'U]/G
POWDER TOPICAL 3 TIMES DAILY
Qty: 30 G | Refills: 1 | Status: SHIPPED | OUTPATIENT
Start: 2025-02-24

## 2025-02-24 NOTE — PROGRESS NOTES
"Name: Grace Renner      : 1960      MRN: [id number]  Encounter Provider: Helen Vasquez DO  Encounter Date: 2025   Encounter department: [facility] [address]  :  Assessment & Plan  Lumbar spondylolysis    Orders:  •  Ambulatory Referral to Physical Therapy; Future    Chronic right hip pain    Orders:  •  XR hip/pelv 2-3 vws right if performed; Future    Candidiasis of skin    Orders:  •  nystatin (MYCOSTATIN) powder; Apply topically 3 (three) times a day           History of Present Illness   HPI    Notes that she has had low back pain for the last few years. Has had injections, last times about 2 years ago with pain management. Denies numbness or tingling. Denies weakness in the legs. Notes that the injections were helpful when she was receiving them. Denies changes in bowel or bladder control. Denies saddle anesthesia.     States that in the last few months, the right hip has been bothersome, this comes for into the groin. States that this is happening at the same times as the back pain     Notes that she fell about 1 month ago, states that she landed flat on her bottom. Denies any changes in pain level or increase in issues after that.     Has been taking hot shower which helps some. Has been using heating pad. Using some topical lidocaine which seems help.     Denies constipation or diarrhea. Denies urinary frequency or urgency.     Review of Systems    Objective   /74 (BP Location: Left arm, Patient Position: Sitting, Cuff Size: Standard)   Pulse 74   Resp 18   Ht 5' 3\" (1.6 m)   Wt 61.7 kg (136 lb)   SpO2 98%   BMI 24.09 kg/m²      Physical Exam  Vitals reviewed.   Constitutional:       General: She is not in acute distress.     Appearance: Normal appearance.   HENT:      Head: Normocephalic and atraumatic.      Right Ear: External ear normal.      Left Ear: External ear normal.      Nose: Nose normal.      Mouth/Throat:      Mouth: " Mucous membranes are moist.   Eyes:      Extraocular Movements: Extraocular movements intact.      Conjunctiva/sclera: Conjunctivae normal.   Cardiovascular:      Rate and Rhythm: Normal rate and regular rhythm.      Heart sounds: Normal heart sounds.   Pulmonary:      Effort: Pulmonary effort is normal.      Breath sounds: Normal breath sounds.   Abdominal:      General: Bowel sounds are normal. There is no distension.      Palpations: Abdomen is soft.      Tenderness: There is no abdominal tenderness.   Musculoskeletal:         General: Tenderness (lumbar paraspinals and SI joint on the right) present.      Right lower leg: No edema.      Left lower leg: No edema.      Comments: SLR negative B/L  Right hip with pain on IR   Skin:     General: Skin is warm.      Capillary Refill: Capillary refill takes less than 2 seconds.      Findings: No rash.   Neurological:      Mental Status: She is alert. Mental status is at baseline.           DO [facility]  2/25/2025 4:34 PM

## 2025-02-26 ENCOUNTER — HOSPITAL ENCOUNTER (OUTPATIENT)
Dept: RADIOLOGY | Facility: HOSPITAL | Age: 65
Discharge: HOME | End: 2025-02-26
Payer: MEDICARE

## 2025-02-26 DIAGNOSIS — M25.551 CHRONIC RIGHT HIP PAIN: ICD-10-CM

## 2025-02-26 DIAGNOSIS — G89.29 CHRONIC RIGHT HIP PAIN: ICD-10-CM

## 2025-02-26 PROCEDURE — 73502 X-RAY EXAM HIP UNI 2-3 VIEWS: CPT

## 2025-02-27 ENCOUNTER — RESULTS FOLLOW-UP (OUTPATIENT)
Dept: FAMILY MEDICINE CLINIC | Facility: CLINIC | Age: 65
End: 2025-02-27

## 2025-03-03 ENCOUNTER — EVALUATION (OUTPATIENT)
Dept: PHYSICAL THERAPY | Facility: CLINIC | Age: 65
End: 2025-03-03
Payer: MEDICARE

## 2025-03-03 DIAGNOSIS — M43.06 LUMBAR SPONDYLOLYSIS: Primary | ICD-10-CM

## 2025-03-03 PROCEDURE — 97112 NEUROMUSCULAR REEDUCATION: CPT | Performed by: PHYSICAL THERAPIST

## 2025-03-03 PROCEDURE — 97162 PT EVAL MOD COMPLEX 30 MIN: CPT | Performed by: PHYSICAL THERAPIST

## 2025-03-03 PROCEDURE — 97110 THERAPEUTIC EXERCISES: CPT | Performed by: PHYSICAL THERAPIST

## 2025-03-03 NOTE — PROGRESS NOTES
PT Evaluation     Today's date: 3/3/2025  Patient name: Grace Renner  : 1960  MRN: [id number]  Referring provider: Helen Vasquez DO  Dx:   Encounter Diagnosis     ICD-10-CM    1. Lumbar spondylolysis  M43.06 Ambulatory Referral to Physical Therapy                     Assessment  Impairments: abnormal or restricted ROM, abnormal movement, activity intolerance, impaired physical strength, lacks appropriate home exercise program, pain with function and poor posture   Symptom irritability: moderate    Assessment details: Grace Renner is a pleasant 64 y.o. female who presents with acute on chronic low back pain.  Physical exam is consistent with lumbar radiculopathy. Primary movement impairment diagnosis of lumbar derangement. Her impairments have lead to participation restrictions in walking, sleep, and getting out of bed in the AM. She would benefit from working with a skilled PT in order to increase participation in aforementioned participation restrictions.    Pt demonstrated a favorable response to lumbar biased extension movements as shown by a decrease in sx's, centralization of sx's, and decreased pain with comparable signs. Pt was instructed to perform exercises every 2-3 hours or sooner if sx's return and postural correction. Patient was instructed to stop performing exercises if pain started to peripheralize and contact their PT        No further referral appears necessary at this time based upon examination results.    Pt. will benefit from skilled PT services to help return patient to status at [facility].             Understanding of Dx/Px/POC: good     Prognosis: good    Goals  ST. Independent with HEP in 2 weeks  2. Pt will have verbal report of improvement in symptoms by >/=25% in 2 weeks     To be achieved by D/C   LT. Pt will improve FOTO score by >/= 6 points in 6 weeks  2. Pt will be able to independently correct posture for self management of sx's   3. Pt will be able to  return to walking routine   4. Pt will be able to get out of car with little to no difficulty   5. Pt will be able to sleep through the night without reproduction of sx's        Plan  Patient would benefit from: skilled physical therapy    Planned therapy interventions: activity modification, manual therapy, motor coordination training, neuromuscular re-education, patient education, self care, therapeutic activities, therapeutic exercise, graded activity, home exercise program, graded exercise, functional ROM exercises and strengthening    Frequency: 2x week  Duration in weeks: 8  Plan of Care beginning date: 3/3/2025  Plan of Care expiration date: 2025  Treatment plan discussed with: patient      Subjective Evaluation    History of Present Illness  Mechanism of injury: She reports that about for about a month and a half she was having back pain that runs from her back to the front of the R hip. She cannot recall any injury that brought this on. She does note that she does get pain in her L hip as well when she is walking. About a year ago she was able to walk 3-5 miles a day and would like to return to doing that. Now she can only walk about 1-2 blocks before her pain comes on. She notes that she is usually worse in the mornings and better as the day goes on.    Patient Goals  Patient goals for therapy: decreased pain  Patient goal: be able to sleep without reproduction of sx's, be able return to walking,  Pain  At best pain ratin  At worst pain ratin  Aggravating factors: sitting (getting out of a car,)  Progression: no change          Objective     Postural Observations  Seated posture: poor  Standing posture: poor  Correction of posture: makes symptoms better      Neurological Testing     Sensation     Lumbar   Left   Intact: light touch    Right   Intact: light touch    Active Range of Motion     Lumbar   Flexion: Active lumbar flexion: increased LBP & hip.  WFL and with pain  Extension: Active  lumbar extension: increased low back pain (not hip)  Restriction level: moderate  Left lateral flexion:  Restriction level: minimal  Right lateral flexion:  with pain Restriction level: minimal  Left rotation:  with pain Restriction level: moderate  Right rotation:  with pain Restriction level: moderate  Mechanical Assessment    Cervical      Thoracic      Lumbar    Standing extension: repeated movements  Pain location: centralized  Pain intensity: better  Pain level: decreased    Strength/Myotome Testing     Left Hip   Planes of Motion   Flexion: 4- (increaesd LBP)    Right Hip   Planes of Motion   Flexion: 4 (increaesd LBP)    Left Knee   Flexion: 5  Extension: 5    Right Knee   Flexion: 4+  Extension: 4+    Left Ankle/Foot   Dorsiflexion: 5  Plantar flexion: 5  Great toe extension: 5    Right Ankle/Foot   Dorsiflexion: 4+  Plantar flexion: 5  Great toe extension: 5    Tests     Lumbar     Left   Negative slump test.     Right   Negative slump test.              Precautions: COPD, Fibro, RA,   POC expires Auth Status Unit limit Start date  Expiration date PT/OT + Visit Limit?   4/28/25 NA   3/3/25   BOMN                                                             Visit/Unit Tracking  AUTH Status:  Date 3/3              N/A Used 1               Remaining                        Manuals 3/3                                                                Neuro Re-Ed             Posture education c lumbar roll  8 min             TB anti rotation c holds             TB rows c holds for posture                                                                  Ther Ex             Pt education on HEP, POC  8 min             TM walking for endurance training              Standing lumbar ext 2x15            SLR flexion BL                                                                 Ther Activity             STS c KB                           Gait Training                                       Modalities

## 2025-03-06 ENCOUNTER — OFFICE VISIT (OUTPATIENT)
Dept: PHYSICAL THERAPY | Facility: CLINIC | Age: 65
End: 2025-03-06
Payer: MEDICARE

## 2025-03-06 DIAGNOSIS — M43.06 LUMBAR SPONDYLOLYSIS: Primary | ICD-10-CM

## 2025-03-06 DIAGNOSIS — J44.9 CHRONIC OBSTRUCTIVE PULMONARY DISEASE, UNSPECIFIED COPD TYPE (HCC): ICD-10-CM

## 2025-03-06 DIAGNOSIS — F32.1 MODERATE MAJOR DEPRESSION, SINGLE EPISODE (HCC): ICD-10-CM

## 2025-03-06 PROCEDURE — 97110 THERAPEUTIC EXERCISES: CPT | Performed by: PHYSICAL THERAPIST

## 2025-03-06 PROCEDURE — 97112 NEUROMUSCULAR REEDUCATION: CPT | Performed by: PHYSICAL THERAPIST

## 2025-03-06 NOTE — PROGRESS NOTES
Daily Note     Today's date: 3/6/2025  Patient name: Grace Renner  : 1960  MRN: [id number]  Referring provider: Helen Vasquez DO  Dx:   Encounter Diagnosis     ICD-10-CM    1. Lumbar spondylolysis  M43.06                      Subjective: Pt reports that she was compliant with her HEP.       Objective: See treatment diary below      Assessment: Tolerated treatment well. Patient would benefit from continued PT. She was able to abolish and centralize her symptoms with prone lying and prone press ups. Pt was instructed to perform progression at home to further abolish sx's at home.       Plan: Continue per plan of care.  Progress treatment as tolerated.       Precautions: COPD, Fibro, RA,   POC expires Auth Status Unit limit Start date  Expiration date PT/OT + Visit Limit?   25 NA   3/3/25   BOMN                                                             Visit/Unit Tracking  AUTH Status:  Date 3/3 3/6             N/A Used 1 2              Remaining                        Manuals 3/3 3/6                                                               Neuro Re-Ed             Posture education c lumbar roll  8 min  6 min            TB anti rotation c holds  RTB 5s x10 ea           TB rows c holds for posture   RTB 2x10                                                               Ther Ex             Pt education on HEP, POC  8 min             TM walking for endurance training   5 min            Standing lumbar ext 2x15 2x15           SLR flexion BL             Prone lying   5 min            Prone on elbows   3 min            Prone press up   2x10                        Ther Activity             STS c KB                           Gait Training                                       Modalities

## 2025-03-07 RX ORDER — ALBUTEROL SULFATE 90 UG/1
AEROSOL, METERED RESPIRATORY (INHALATION)
Qty: 18 G | Refills: 2 | Status: SHIPPED | OUTPATIENT
Start: 2025-03-07

## 2025-03-07 RX ORDER — BUPROPION HYDROCHLORIDE 300 MG/1
300 TABLET ORAL EVERY MORNING
Qty: 90 TABLET | Refills: 1 | Status: SHIPPED | OUTPATIENT
Start: 2025-03-07

## 2025-03-10 ENCOUNTER — OFFICE VISIT (OUTPATIENT)
Dept: PHYSICAL THERAPY | Facility: CLINIC | Age: 65
End: 2025-03-10
Payer: MEDICARE

## 2025-03-10 DIAGNOSIS — M43.06 LUMBAR SPONDYLOLYSIS: Primary | ICD-10-CM

## 2025-03-10 PROCEDURE — 97110 THERAPEUTIC EXERCISES: CPT

## 2025-03-10 PROCEDURE — 97112 NEUROMUSCULAR REEDUCATION: CPT

## 2025-03-10 NOTE — PROGRESS NOTES
"Daily Note     Today's date: 3/10/2025  Patient name: Grace Renner  : 1960  MRN: [id number]  Referring provider: Helen Vasquez DO  Dx:   Encounter Diagnosis     ICD-10-CM    1. Lumbar spondylolysis  M43.06           Start Time: 0840  Stop Time: 09  Total time in clinic (min): 40 minutes    Subjective: pt reports yesterday she had a flare up but is feeling a bit better today.       Objective: See treatment diary below      Assessment: Introduced core stability and hip strengthening this session with cueing for correct performance. Discharged prone press ups due to shoulder/wrist pain. Increase in symptoms into the hip with LTRs. Tolerated treatment well. Patient would benefit from continued PT      Plan: Continue per plan of care.      Precautions: COPD, Fibro, RA,   POC expires Auth Status Unit limit Start date  Expiration date PT/OT + Visit Limit?   25 NA   3/3/25   BOMN     Visit/Unit Tracking  AUTH Status:  Date 3/3 3/6             N/A Used 1 2              Remaining                  Manuals 3/3 3/6 3/10                                                              Neuro Re-Ed             Posture education c lumbar roll  8 min  6 min            Pallof press  RTB 5s x10 ea RTB 5\" 10x ea           TA activation    10\" 10x          TA mini march   10x ea           TA BKFO    10x ea           TA SLR   nv                       Ther Ex             Pt education on HEP, POC  8 min             TM walking for endurance training   5 min  5 min          LTR             Standing lumbar ext 2x15 2x15 2x15          Prone on elbows   3 min  3 min           Prone press up   2x10 D/c          Bridges   nv          Lateral band walk                           Ther Activity             STS c KB                           Gait Training                                       Modalities                                              "

## 2025-03-12 ENCOUNTER — OFFICE VISIT (OUTPATIENT)
Dept: PHYSICAL THERAPY | Facility: CLINIC | Age: 65
End: 2025-03-12
Payer: MEDICARE

## 2025-03-12 DIAGNOSIS — M43.06 LUMBAR SPONDYLOLYSIS: Primary | ICD-10-CM

## 2025-03-12 PROCEDURE — 97110 THERAPEUTIC EXERCISES: CPT

## 2025-03-12 PROCEDURE — 97112 NEUROMUSCULAR REEDUCATION: CPT

## 2025-03-12 NOTE — PROGRESS NOTES
"Daily Note     Today's date: 3/12/2025  Patient name: Grace Renner  : 1960  MRN: [id number]  Referring provider: Helen Vasquez DO  Dx:   Encounter Diagnosis     ICD-10-CM    1. Lumbar spondylolysis  M43.06           Start Time: 845  Stop Time: 925  Total time in clinic (min): 40 minutes    Subjective: pt reports she felt good after last session, but helped her  in the yard that involved a lot of bending.       Objective: See treatment diary below      Assessment: Able to progress in therapy with increased repetitions of core stability exercises. Introduced SLR and bridges this session with cueing for correct performance and core activation. Tolerated treatment well. Patient would benefit from continued PT      Plan: Continue per plan of care.      Precautions: COPD, Fibro, RA,   POC expires Auth Status Unit limit Start date  Expiration date PT/OT + Visit Limit?   25 NA   3/3/25   BOMN     Visit/Unit Tracking  AUTH Status:  Date 3/3 3/6             N/A Used 1 2              Remaining                  Manuals 3/3 3/6 3/10 3/12                                                             Neuro Re-Ed             Posture education c lumbar roll  8 min  6 min            Pallof press  RTB 5s x10 ea RTB 5\" 10x ea  RTB 5\" 10x          TA activation    10\" 10x 10\" 10x          TA mini march   10x ea  20x ea          TA BKFO    10x ea  20x ea         TA SLR   nv 10x                       Ther Ex             Pt education on HEP, POC  8 min             TM walking for endurance training   5 min  5 min 5 min          LTR             Standing lumbar ext 2x15 2x15 2x15 2x15         Prone on elbows   3 min  3 min  3 min          Prone press up   2x10 D/c          Bridges   nv 2x10         Lateral band walk                           Ther Activity             STS c KB                           Gait Training                                       Modalities                                                "

## 2025-03-17 ENCOUNTER — OFFICE VISIT (OUTPATIENT)
Dept: PHYSICAL THERAPY | Facility: CLINIC | Age: 65
End: 2025-03-17
Payer: MEDICARE

## 2025-03-17 DIAGNOSIS — M43.06 LUMBAR SPONDYLOLYSIS: Primary | ICD-10-CM

## 2025-03-17 PROCEDURE — 97140 MANUAL THERAPY 1/> REGIONS: CPT | Performed by: PHYSICAL THERAPIST

## 2025-03-17 PROCEDURE — 97112 NEUROMUSCULAR REEDUCATION: CPT | Performed by: PHYSICAL THERAPIST

## 2025-03-17 PROCEDURE — 97110 THERAPEUTIC EXERCISES: CPT | Performed by: PHYSICAL THERAPIST

## 2025-03-17 NOTE — PROGRESS NOTES
"Daily Note     Today's date: 3/17/2025  Patient name: Grace Renner  : 1960  MRN: [id number]  Referring provider: Helen Vasquez DO  Dx:   Encounter Diagnosis     ICD-10-CM    1. Lumbar spondylolysis  M43.06                      Subjective: Pt reports that she went for a walk w/ her  yesterday x 25 minutes and her back hurt the entire time.     Objective: See treatment diary below      Assessment: Attempted gentle TPR to l/s psp - some improvement in tissue tone noted.  Tx session tolerated fairly well, but pt did c/o centralized LBP while in supine position. Overall, pt reports some improvements in pain level once standing post tx session.  Patient would benefit from continued PT      Plan: Continue per plan of care.      Precautions: COPD, Fibro, RA, Hx of breast CA  POC expires Auth Status Unit limit Start date  Expiration date PT/OT + Visit Limit?   25 NA   3/3/25   BOMN     Visit/Unit Tracking  AUTH Status:  Date 3/3 3/6             N/A Used 1 2              Remaining                  Manuals 3/3 3/6 3/10 3/12 3/17        TPR to l/s psp (prone)     RB                                               Neuro Re-Ed             Posture education c lumbar roll  8 min  6 min            Pallof press  RTB 5s x10 ea RTB 5\" 10x ea  RTB 5\" 10x          TA activation    10\" 10x 10\" 10x  10\"x10         TA mini march   10x ea  20x ea  20x ea        TA BKFO    10x ea  20x ea 20x ea         TA SLR   nv 10x  10x ea                     Ther Ex             Pt education on HEP, POC  8 min             TM walking for endurance training   5 min  5 min 5 min  5'        LTR     10\"x10        Standing lumbar ext 2x15 2x15 2x15 2x15 2x10        Prone on elbows   3 min  3 min  3 min  3'        Prone press up   2x10 D/c          Bridges   nv 2x10 2x10        Lateral band walk                           Ther Activity             STS c KB                           Gait Training                                     "   Modalities

## 2025-03-19 ENCOUNTER — APPOINTMENT (OUTPATIENT)
Dept: PHYSICAL THERAPY | Facility: CLINIC | Age: 65
End: 2025-03-19
Payer: MEDICARE

## 2025-03-20 NOTE — PROGRESS NOTES
Assessment:  1. Primary osteoarthritis of right hip    2. Coxa profunda, right    3. Sacroiliitis (HCC)    4. Lumbar facet arthropathy        Plan:    This is a 64-year-old female who returns her office after quite some time with increased right greater than left lower back pain with radiation into the right groin region.  Symptoms are similar in presentation to previous.  She was last seen for second round of branch block in the right lower lumbar region.  However was lost to follow-up as she had moved and was feeling better at the time.    On reexamination today, her symptoms are suggestive of possible SI joint aggravation as there is tenderness over the joint with pain with provocative maneuvers notable low.  She does also have some degenerative issues in the right hiP, however provocative maneuvers of the hip did not reproduce significant right groin pain.  Instead she had more right posterior hip pain/buttock pain with this.  Therefore, we discussed right SI joint injection under fluoroscopic guidance.  If she still having notable groin pain after the procedure then may consider intra-articular hip injection.  If she still having notable back pain after the procedure, then would consider completing RFA course for the right lower lumbar region.  [place] Prescription Drug Monitoring Program report was reviewed and was appropriate     Complete risks and benefits including bleeding, infection, tissue reaction, nerve injury and allergic reaction were discussed. The approach was demonstrated using models and literature was provided. Verbal and written consent was obtained.    My impressions and treatment recommendations were discussed in detail with the patient who verbalized understanding and had no further questions.  Discharge instructions were provided. I personally saw and examined the patient and I agree with the above discussed plan of care.    No orders of the defined types were placed in this  encounter.    No orders of the defined types were placed in this encounter.      History of Present Illness:  Grace Renner is a 64 y.o. female who presents for a follow up office visit in regards to Hip Pain (Right side hip pain ) and Back Pain (Lower back pain. Pain runs to each side and wraps around to the right hip and into the groin ).     Last seen in May 2023 for right sided lumbar MBB #2.  We did not receive pain diary back from the patient therefore RFA was never scheduled.  She returns today with similar symptoms as last office visit with pain across the lower back, worse on the right side with radiation into the right hip and groin region.  It is present all day.  Constant, sharp, shooting in nature.        I have personally reviewed and/or updated the patient's past medical history, past surgical history, family history, social history, current medications, allergies, and vital signs today.     Review of Systems   Musculoskeletal:  Positive for arthralgias.       Patient Active Problem List   Diagnosis   • Seronegative rheumatoid arthritis (HCC)   • Mixed hyperlipidemia   • Muscle pain, fibromyalgia   • IFG (impaired fasting glucose)   • Chronic obstructive pulmonary disease, unspecified COPD type (HCC)   • H/O gastric sleeve   • History of breast cancer in female   • Hypothyroidism due to Hashimoto's thyroiditis   • Meningioma (HCC)   • Thalassemia   • Encounter for surgical aftercare following surgery of digestive system   • Postsurgical malabsorption   • GERD (gastroesophageal reflux disease)   • Epigastric pain   • Major depressive disorder, single episode, moderate (HCC)   • Lipoma of torso       Past Medical History:   Diagnosis Date   • Cancer (HCC)     breast   • COPD (chronic obstructive pulmonary disease) (HCC)    • Disease of thyroid gland    • Emphysema lung (HCC)    • Fibromyalgia    • GERD (gastroesophageal reflux disease)    • Hashimoto's thyroiditis    • History of breast cancer    •  RA (rheumatoid arthritis) (HCC)    • Rheumatoid arthritis involving multiple sites (HCC)        Past Surgical History:   Procedure Laterality Date   • APPENDECTOMY     • BREAST LUMPECTOMY Left    •  SECTION      , ,   • CHEST WALL BIOPSY Bilateral 2024    Procedure: EXCISION  BIOPSY LESION/MASS ABDOMINAL/CHEST WALL x2;  Surgeon: Ivan Leung DO;  Location: [facility];  Service: General   • CHOLECYSTECTOMY     • COLOGUARD (HISTORICAL)     • CYST REMOVAL  2022   • FOOT SURGERY Left    • GASTRIC BYPASS     • KNEE SURGERY Left    • ROTATOR CUFF REPAIR Right    • SHOULDER SURGERY Right     Muscle tendon transfer    • WISDOM TOOTH EXTRACTION     • WOUND DEBRIDEMENT N/A 2022    Procedure: EXCISIONAL DEBRIDEMENT OF BACK;  Surgeon: Sheree Mcwilliams MD;  Location: [facility];  Service: General       Family History   Problem Relation Age of Onset   • Skin cancer Mother    • Alzheimer's disease Mother    • Cirrhosis Father    • Coronary artery disease Brother    • Cirrhosis Brother    • Breast cancer Maternal Grandmother        Social History     Occupational History   • Not on file   Tobacco Use   • Smoking status: Former     Current packs/day: 0.00     Types: Cigarettes     Start date:      Quit date:      Years since quittin.2     Passive exposure: Past   • Smokeless tobacco: Never   Vaping Use   • Vaping status: Never Used   Substance and Sexual Activity   • Alcohol use: Yes     Alcohol/week: 14.0 standard drinks of alcohol     Types: 14 Glasses of wine per week     Comment: 1-2 glasses of wine daily.   • Drug use: Never   • Sexual activity: Not Currently     Partners: Male       Current Outpatient Medications on File Prior to Visit   Medication Sig   • Biotin 1000 MCG tablet Take 1,000 mcg by mouth daily   • buPROPion (WELLBUTRIN XL) 300 mg 24 hr tablet TAKE 1 TABLET (300 MG TOTAL) BY MOUTH EVERY MORNING.   • busPIRone (BUSPAR) 5 mg tablet TAKE 1 TABLET BY MOUTH  TWICE A DAY   • Cholecalciferol 25 MCG (1000 UT) tablet Take by mouth   • cyclobenzaprine (FLEXERIL) 5 mg tablet Take 1 tablet (5 mg total) by mouth 3 (three) times a day as needed (fibromyalgia flare)   • EPINEPHrine (EPIPEN) 0.3 mg/0.3 mL SOAJ Inject 0.3 mg into a muscle once   • fexofenadine (ALLEGRA) 180 MG tablet Take 1 tablet (180 mg total) by mouth daily   • fluticasone (FLONASE) 50 mcg/act nasal spray 2 sprays into each nostril daily   • folic acid (FOLVITE) 1 mg tablet Take 1 tablet by mouth daily except on Wednesdays.    • gabapentin (NEURONTIN) 300 mg capsule Take 3 capsules (900 mg total) by mouth daily Take 300 in AM and 600 in PM   • ibuprofen (MOTRIN) 800 mg tablet Take 800 mg by mouth as needed PRN   • levothyroxine 112 mcg tablet Take 1 tablet (112 mcg total) by mouth in the morning   • lidocaine-prilocaine (EMLA) cream Apply topically as needed for mild pain   • methotrexate 2.5 mg tablet Takes 8 pills weekly on wednesday    • mometasone-formoterol (DULERA) 100-5 MCG/ACT inhaler Inhale 2 puffs 2 (two) times a day Rinse mouth after use.   • montelukast (SINGULAIR) 10 mg tablet Take 1 tablet (10 mg total) by mouth daily at bedtime   • nystatin (MYCOSTATIN) powder Apply topically 3 (three) times a day   • omeprazole (PriLOSEC) 40 MG capsule Take 1 capsule (40 mg total) by mouth daily   • SUMAtriptan (Imitrex) 25 mg tablet 1 p.o. at headache onset, may repeat 1 after 2 hours p.r.n.   • traZODone (DESYREL) 50 mg tablet TAKE 1 TABLET BY MOUTH DAILY AT BEDTIME   • Ventolin  (90 Base) MCG/ACT inhaler INHALE 2 PUFFS EVERY 4 HOURS AS NEEDED FOR WHEEZING OR SHORTNESS OF BREATH   • Diclofenac Epolamine 1.3 % PTCH 2 (two) times a day as needed   (Patient not taking: Reported on 10/14/2024)   • Diclofenac Sodium (VOLTAREN) 1 % Apply 2 g topically 4 (four) times a day (Patient not taking: Reported on 10/14/2024)   • nystatin-triamcinolone (MYCOLOG-II) cream PRN (Patient not taking: Reported on 12/30/2024)  "  • predniSONE 10 mg tablet TAKE 1 TABLET (10 MG TOTAL) BY MOUTH DAILY AS NEEDED (INFLAMMATION) (Patient not taking: Reported on 3/21/2025)     No current facility-administered medications on file prior to visit.       Allergies   Allergen Reactions   • Ciprofloxacin Hives   • Keflex [Cephalexin] Hives   • Penicillins Hives   • Sulfa Antibiotics Hives       Physical Exam:    Ht 5' 3\" (1.6 m)   Wt 61.7 kg (136 lb)   BMI 24.09 kg/m²     Constitutional:normal, well developed, well nourished, alert, in no distress and non-toxic and no overt pain behavior.  Eyes:anicteric  HEENT:grossly intact  Neck:supple, symmetric, trachea midline and no masses   Pulmonary:even and unlabored  Cardiovascular:No edema or pitting edema present  Skin:Normal without rashes or lesions and well hydrated  Psychiatric:Mood and affect appropriate  Neurologic:Cranial Nerves II-XII grossly intact  Musculoskeletal: Stinchfield maneuver, logrolling of the right lower extremity and external rotation during VEENA maneuver elicit posterior hip pain and only mild right groin pain.  Tender to palpation over the right lower lumbar paraspinal region and right SI joint.  Positive Gaenslen's, thigh thrust, Veena on the right.    Facet loading positive on the right.  No significant pain with lumbar extension.  No pain with lumbar flexion.    Imaging    RIGHT HIP   2/26/25     INDICATION:   Pain in right hip. Other chronic pain.      COMPARISON:  None.     VIEWS:  XR HIP/PELV 2-3 VWS RIGHT W PELVIS IF PERFORMED      FINDINGS:     There is no acute displaced fracture or dislocation.     Right coxa profunda. Mild degenerative arthritis right hip.     No lytic or blastic osseous lesion.     Soft tissues are unremarkable.     Degenerative changes in the visualized lower lumbar spine and left hip.     IMPRESSION:        Right coxa profunda. Mild degenerative arthritis right hip.     "

## 2025-03-21 ENCOUNTER — PATIENT MESSAGE (OUTPATIENT)
Dept: PAIN MEDICINE | Facility: CLINIC | Age: 65
End: 2025-03-21

## 2025-03-21 ENCOUNTER — OFFICE VISIT (OUTPATIENT)
Dept: PAIN MEDICINE | Facility: CLINIC | Age: 65
End: 2025-03-21
Payer: MEDICARE

## 2025-03-21 VITALS — WEIGHT: 136 LBS | BODY MASS INDEX: 24.1 KG/M2 | HEIGHT: 63 IN

## 2025-03-21 DIAGNOSIS — M46.1 SACROILIITIS (HCC): ICD-10-CM

## 2025-03-21 DIAGNOSIS — M24.851 COXA PROFUNDA, RIGHT: ICD-10-CM

## 2025-03-21 DIAGNOSIS — M16.11 PRIMARY OSTEOARTHRITIS OF RIGHT HIP: Primary | ICD-10-CM

## 2025-03-21 DIAGNOSIS — M47.816 LUMBAR FACET ARTHROPATHY: ICD-10-CM

## 2025-03-21 PROCEDURE — 99214 OFFICE O/P EST MOD 30 MIN: CPT | Performed by: STUDENT IN AN ORGANIZED HEALTH CARE EDUCATION/TRAINING PROGRAM

## 2025-03-21 NOTE — PATIENT COMMUNICATION
Pt is scheduled for sij injection with Dr Guzman on 4/10/25    Pt is not diabetic and injection type does not require medication holds    Pt given instructions in office and via myc message    Have you completed PT/HEP/Chiro in the past 6 months for dedicated area? Current PT with [facility]  If yes, how long did you complete?  What was the frequency?  Did it provide relief?  If no, reason therapy was not completed?

## 2025-03-24 ENCOUNTER — APPOINTMENT (OUTPATIENT)
Dept: PHYSICAL THERAPY | Facility: CLINIC | Age: 65
End: 2025-03-24
Payer: MEDICARE

## 2025-03-26 ENCOUNTER — APPOINTMENT (OUTPATIENT)
Dept: PHYSICAL THERAPY | Facility: CLINIC | Age: 65
End: 2025-03-26
Payer: MEDICARE

## 2025-04-10 ENCOUNTER — HOSPITAL ENCOUNTER (OUTPATIENT)
Dept: RADIOLOGY | Facility: CLINIC | Age: 65
End: 2025-04-10
Payer: MEDICARE

## 2025-04-10 VITALS
RESPIRATION RATE: 20 BRPM | DIASTOLIC BLOOD PRESSURE: 55 MMHG | HEART RATE: 82 BPM | SYSTOLIC BLOOD PRESSURE: 92 MMHG | TEMPERATURE: 98.3 F | OXYGEN SATURATION: 95 %

## 2025-04-10 DIAGNOSIS — M46.1 SACROILIITIS (HCC): ICD-10-CM

## 2025-04-10 PROCEDURE — 27096 INJECT SACROILIAC JOINT: CPT | Performed by: STUDENT IN AN ORGANIZED HEALTH CARE EDUCATION/TRAINING PROGRAM

## 2025-04-10 RX ORDER — ROPIVACAINE HYDROCHLORIDE 2 MG/ML
1 INJECTION, SOLUTION EPIDURAL; INFILTRATION; PERINEURAL ONCE
Status: COMPLETED | OUTPATIENT
Start: 2025-04-10 | End: 2025-04-10

## 2025-04-10 RX ORDER — METHYLPREDNISOLONE ACETATE 40 MG/ML
40 INJECTION, SUSPENSION INTRA-ARTICULAR; INTRALESIONAL; INTRAMUSCULAR; PARENTERAL; SOFT TISSUE ONCE
Status: COMPLETED | OUTPATIENT
Start: 2025-04-10 | End: 2025-04-10

## 2025-04-10 RX ADMIN — METHYLPREDNISOLONE ACETATE 40 MG: 40 INJECTION, SUSPENSION INTRA-ARTICULAR; INTRALESIONAL; INTRAMUSCULAR; SOFT TISSUE at 10:18

## 2025-04-10 RX ADMIN — ROPIVACAINE HYDROCHLORIDE 1 ML: 2 INJECTION, SOLUTION EPIDURAL; INFILTRATION at 10:18

## 2025-04-10 RX ADMIN — IOHEXOL 0.5 ML: 300 INJECTION, SOLUTION INTRAVENOUS at 10:18

## 2025-04-10 NOTE — DISCHARGE INSTR - LAB

## 2025-04-14 ENCOUNTER — OFFICE VISIT (OUTPATIENT)
Dept: FAMILY MEDICINE CLINIC | Facility: CLINIC | Age: 65
End: 2025-04-14
Payer: MEDICARE

## 2025-04-14 VITALS
DIASTOLIC BLOOD PRESSURE: 66 MMHG | SYSTOLIC BLOOD PRESSURE: 102 MMHG | BODY MASS INDEX: 25.16 KG/M2 | HEART RATE: 90 BPM | RESPIRATION RATE: 18 BRPM | OXYGEN SATURATION: 97 % | WEIGHT: 142 LBS | HEIGHT: 63 IN

## 2025-04-14 DIAGNOSIS — E78.2 MIXED HYPERLIPIDEMIA: Primary | ICD-10-CM

## 2025-04-14 DIAGNOSIS — M06.00 SERONEGATIVE RHEUMATOID ARTHRITIS (HCC): ICD-10-CM

## 2025-04-14 PROCEDURE — G2211 COMPLEX E/M VISIT ADD ON: HCPCS | Performed by: FAMILY MEDICINE

## 2025-04-14 PROCEDURE — 99214 OFFICE O/P EST MOD 30 MIN: CPT | Performed by: FAMILY MEDICINE

## 2025-04-14 NOTE — PROGRESS NOTES
"Name: Grace Renner      : 1960      MRN: [id number]  Encounter Provider: Helen Vasquez DO  Encounter Date: 2025   Encounter department: [facility] [address]  :  Assessment & Plan  Mixed hyperlipidemia    Orders:  •  Lipid panel; Future    Seronegative rheumatoid arthritis (HCC)  Stable, following with rheum and pain management.               History of Present Illness   HPI    Notes that she has been getting sick a lot from her grand kids. States that she is looking for supplement options.     SI joint injection has been helpful, planning follow up with Dr. Guzman.     Review of Systems    Objective   /66 (BP Location: Left arm, Patient Position: Sitting, Cuff Size: Standard)   Pulse 90   Resp 18   Ht 5' 3\" (1.6 m)   Wt 64.4 kg (142 lb)   SpO2 97%   BMI 25.15 kg/m²      Physical Exam  Vitals reviewed.   Constitutional:       General: She is not in acute distress.     Appearance: Normal appearance.   HENT:      Head: Normocephalic and atraumatic.      Right Ear: External ear normal.      Left Ear: External ear normal.      Nose: Rhinorrhea present.      Mouth/Throat:      Mouth: Mucous membranes are moist.   Eyes:      Extraocular Movements: Extraocular movements intact.      Conjunctiva/sclera: Conjunctivae normal.      Pupils: Pupils are equal, round, and reactive to light.   Cardiovascular:      Rate and Rhythm: Normal rate and regular rhythm.      Heart sounds: Normal heart sounds.   Pulmonary:      Effort: Pulmonary effort is normal.      Breath sounds: Normal breath sounds.   Abdominal:      General: Bowel sounds are normal. There is no distension.      Palpations: Abdomen is soft.      Tenderness: There is no abdominal tenderness.   Musculoskeletal:      Cervical back: Neck supple.      Right lower leg: No edema.      Left lower leg: No edema.   Lymphadenopathy:      Cervical: No cervical adenopathy.   Skin:     General: Skin is warm.    "   Capillary Refill: Capillary refill takes less than 2 seconds.      Findings: No rash.   Neurological:      Mental Status: She is alert. Mental status is at baseline.           DO Sonu Ceja [facility]  4/15/2025 4:07 PM

## 2025-04-22 ENCOUNTER — TELEMEDICINE (OUTPATIENT)
Dept: OTHER | Facility: HOSPITAL | Age: 65
End: 2025-04-22
Payer: MEDICARE

## 2025-04-22 ENCOUNTER — APPOINTMENT (OUTPATIENT)
Dept: LAB | Facility: HOSPITAL | Age: 65
End: 2025-04-22
Attending: PHYSICIAN ASSISTANT
Payer: MEDICARE

## 2025-04-22 DIAGNOSIS — N30.01 ACUTE CYSTITIS WITH HEMATURIA: Primary | ICD-10-CM

## 2025-04-22 DIAGNOSIS — N30.01 ACUTE CYSTITIS WITH HEMATURIA: ICD-10-CM

## 2025-04-22 PROCEDURE — 87077 CULTURE AEROBIC IDENTIFY: CPT

## 2025-04-22 PROCEDURE — 87186 SC STD MICRODIL/AGAR DIL: CPT

## 2025-04-22 PROCEDURE — 99213 OFFICE O/P EST LOW 20 MIN: CPT | Performed by: PHYSICIAN ASSISTANT

## 2025-04-22 PROCEDURE — 87086 URINE CULTURE/COLONY COUNT: CPT

## 2025-04-22 RX ORDER — DOXYCYCLINE HYCLATE 100 MG
100 TABLET ORAL 2 TIMES DAILY
Qty: 14 TABLET | Refills: 0 | Status: SHIPPED | OUTPATIENT
Start: 2025-04-22 | End: 2025-04-29

## 2025-04-22 NOTE — PROGRESS NOTES
Virtual Regular Visit  Name: Grace Renner      : 1960      MRN: [id number]  Encounter Provider: Shannon D Severino, PA-C  Encounter Date: 2025   Encounter department: VIRTUAL CARE       Verification of patient location:  Patient is located at Home in the following state in which I hold an active license [place] :  Assessment & Plan  Acute cystitis with hematuria    Orders:    doxycycline hyclate (VIBRA-TABS) 100 mg tablet; Take 1 tablet (100 mg total) by mouth 2 (two) times a day for 7 days    Urine culture; Future        Encounter provider Shannon D Severino, PA-C    The patient was identified by name and date of birth. Grace Renner was informed that this is a telemedicine visit and that the visit is being conducted through the Epic Embedded platform. She agrees to proceed..  My office door was closed. No one else was in the room. She acknowledged consent and understanding of privacy and security of the video platform. The patient has agreed to participate and understands they can discontinue the visit at any time.    Patient is aware this is a billable service.     History obtained from: patient  History of Present Illness     Pt reports UTI sx starting 4 days ago. Tied increasing fluid intake, azo without relief. Has urethral spasms at end of stream, hematuria, frequency, urgency. No back pain or fevers, changes to vaginal discharge, concern for STI. Was on doxy about 1 month ago for sinusitis      Review of Systems   Constitutional:  Negative for fever.   HENT:  Negative for nosebleeds.    Eyes:  Negative for redness.   Respiratory:  Negative for shortness of breath.    Cardiovascular:  Negative for chest pain.   Gastrointestinal:  Negative for blood in stool.   Genitourinary:  Positive for frequency, hematuria, pelvic pain and urgency. Negative for vaginal bleeding and vaginal discharge.   Musculoskeletal:  Negative for gait problem.   Skin:  Negative for rash.   Neurological:  Negative  for seizures.   Psychiatric/Behavioral:  Negative for behavioral problems.        Objective   There were no vitals taken for this visit.    Physical Exam  Constitutional:       General: She is not in acute distress.     Appearance: Normal appearance. She is not toxic-appearing.   HENT:      Head: Normocephalic and atraumatic.      Nose: No rhinorrhea.      Mouth/Throat:      Mouth: Mucous membranes are moist.      Comments: Ill-fitting lower dentures  Eyes:      Conjunctiva/sclera: Conjunctivae normal.      Comments: glasses   Pulmonary:      Effort: Pulmonary effort is normal. No respiratory distress.      Breath sounds: No wheezing (no gross audible wheeze through computer).   Musculoskeletal:      Cervical back: Normal range of motion.   Skin:     Findings: No rash (on face or neck).   Neurological:      Mental Status: She is alert.      Cranial Nerves: No dysarthria or facial asymmetry.   Psychiatric:         Mood and Affect: Mood normal.         Behavior: Behavior normal.         Visit Time  Total Visit Duration: 7 minutes not including the time spent for establishing the audio/video connection.

## 2025-04-22 NOTE — PATIENT INSTRUCTIONS
"Thank you for choosing to trust [facility] with your care today. Virtual visits are very convenient, but do have some limitations. Schedule a follow-up appointment with your primary care physician for recheck in person in 2-3 days-especially if symptoms aren't improving. If you cannot see your PCP, you can schedule a follow up appointment at a [facility]. Go to the emergency department if you develop any new or worsening symptoms including back pain, fever, or anything else that is concerning.    Notes for work/school can be found in \"Letters\" section of [employer]  Any referrals placed can be found under \"Upcoming Tests & Procedures  Care Anywhere phone number is [phone] if you need assistance or have further questions    [phone]  Schedule or Reschedule Outpatient Testing - Option 2  Billing - Option 3  General Info - Option 4  mascotsecrett Help - Option 5  Comprehensive Spine Program - Option 6    "

## 2025-04-24 ENCOUNTER — APPOINTMENT (OUTPATIENT)
Dept: LAB | Facility: CLINIC | Age: 65
End: 2025-04-24
Payer: MEDICARE

## 2025-04-24 ENCOUNTER — RESULTS FOLLOW-UP (OUTPATIENT)
Age: 65
End: 2025-04-24

## 2025-04-24 ENCOUNTER — OFFICE VISIT (OUTPATIENT)
Dept: FAMILY MEDICINE CLINIC | Facility: CLINIC | Age: 65
End: 2025-04-24
Payer: MEDICARE

## 2025-04-24 VITALS
SYSTOLIC BLOOD PRESSURE: 100 MMHG | OXYGEN SATURATION: 98 % | WEIGHT: 142 LBS | HEART RATE: 96 BPM | BODY MASS INDEX: 25.16 KG/M2 | HEIGHT: 63 IN | TEMPERATURE: 97.6 F | DIASTOLIC BLOOD PRESSURE: 64 MMHG

## 2025-04-24 DIAGNOSIS — E06.3 HYPOTHYROIDISM DUE TO HASHIMOTO'S THYROIDITIS: ICD-10-CM

## 2025-04-24 DIAGNOSIS — M79.7 SCAPULOHUMERAL FIBROSITIS: ICD-10-CM

## 2025-04-24 DIAGNOSIS — M06.00: ICD-10-CM

## 2025-04-24 DIAGNOSIS — Z79.899 DRUG THERAPY: ICD-10-CM

## 2025-04-24 DIAGNOSIS — R39.9 UTI SYMPTOMS: Primary | ICD-10-CM

## 2025-04-24 DIAGNOSIS — E78.2 MIXED HYPERLIPIDEMIA: ICD-10-CM

## 2025-04-24 LAB
ALBUMIN SERPL BCG-MCNC: 4.1 G/DL (ref 3.5–5)
ALP SERPL-CCNC: 92 U/L (ref 34–104)
ALT SERPL W P-5'-P-CCNC: 13 U/L (ref 7–52)
ANION GAP SERPL CALCULATED.3IONS-SCNC: 6 MMOL/L (ref 4–13)
AST SERPL W P-5'-P-CCNC: 17 U/L (ref 13–39)
BACTERIA UR CULT: ABNORMAL
BASOPHILS # BLD AUTO: 0.13 THOUSANDS/ÂΜL (ref 0–0.1)
BASOPHILS NFR BLD AUTO: 2 % (ref 0–1)
BILIRUB SERPL-MCNC: 0.75 MG/DL (ref 0.2–1)
BUN SERPL-MCNC: 18 MG/DL (ref 5–25)
CALCIUM SERPL-MCNC: 9.1 MG/DL (ref 8.4–10.2)
CHLORIDE SERPL-SCNC: 103 MMOL/L (ref 96–108)
CHOLEST SERPL-MCNC: 213 MG/DL (ref ?–200)
CO2 SERPL-SCNC: 31 MMOL/L (ref 21–32)
CREAT SERPL-MCNC: 0.89 MG/DL (ref 0.6–1.3)
CRP SERPL QL: 1.7 MG/L
EOSINOPHIL # BLD AUTO: 0.19 THOUSAND/ÂΜL (ref 0–0.61)
EOSINOPHIL NFR BLD AUTO: 3 % (ref 0–6)
ERYTHROCYTE [DISTWIDTH] IN BLOOD BY AUTOMATED COUNT: 17.2 % (ref 11.6–15.1)
ERYTHROCYTE [SEDIMENTATION RATE] IN BLOOD: 10 MM/HOUR (ref 0–29)
GFR SERPL CREATININE-BSD FRML MDRD: 69 ML/MIN/1.73SQ M
GLUCOSE P FAST SERPL-MCNC: 73 MG/DL (ref 65–99)
HCT VFR BLD AUTO: 37.4 % (ref 34.8–46.1)
HDLC SERPL-MCNC: 60 MG/DL
HGB BLD-MCNC: 11.2 G/DL (ref 11.5–15.4)
IMM GRANULOCYTES # BLD AUTO: 0.02 THOUSAND/UL (ref 0–0.2)
IMM GRANULOCYTES NFR BLD AUTO: 0 % (ref 0–2)
LDLC SERPL CALC-MCNC: 133 MG/DL (ref 0–100)
LYMPHOCYTES # BLD AUTO: 1.4 THOUSANDS/ÂΜL (ref 0.6–4.47)
LYMPHOCYTES NFR BLD AUTO: 22 % (ref 14–44)
MCH RBC QN AUTO: 22.4 PG (ref 26.8–34.3)
MCHC RBC AUTO-ENTMCNC: 29.9 G/DL (ref 31.4–37.4)
MCV RBC AUTO: 75 FL (ref 82–98)
MONOCYTES # BLD AUTO: 0.58 THOUSAND/ÂΜL (ref 0.17–1.22)
MONOCYTES NFR BLD AUTO: 9 % (ref 4–12)
NEUTROPHILS # BLD AUTO: 4.06 THOUSANDS/ÂΜL (ref 1.85–7.62)
NEUTS SEG NFR BLD AUTO: 64 % (ref 43–75)
NONHDLC SERPL-MCNC: 153 MG/DL
NRBC BLD AUTO-RTO: 0 /100 WBCS
PLATELET # BLD AUTO: 318 THOUSANDS/UL (ref 149–390)
PMV BLD AUTO: 9.6 FL (ref 8.9–12.7)
POTASSIUM SERPL-SCNC: 4.5 MMOL/L (ref 3.5–5.3)
PROT SERPL-MCNC: 6.7 G/DL (ref 6.4–8.4)
RBC # BLD AUTO: 5.01 MILLION/UL (ref 3.81–5.12)
SODIUM SERPL-SCNC: 140 MMOL/L (ref 135–147)
TRIGL SERPL-MCNC: 102 MG/DL (ref ?–150)
TSH SERPL DL<=0.05 MIU/L-ACNC: 2.03 UIU/ML (ref 0.45–4.5)
WBC # BLD AUTO: 6.38 THOUSAND/UL (ref 4.31–10.16)

## 2025-04-24 PROCEDURE — G2211 COMPLEX E/M VISIT ADD ON: HCPCS

## 2025-04-24 PROCEDURE — 85025 COMPLETE CBC W/AUTO DIFF WBC: CPT

## 2025-04-24 PROCEDURE — 86140 C-REACTIVE PROTEIN: CPT

## 2025-04-24 PROCEDURE — 99214 OFFICE O/P EST MOD 30 MIN: CPT

## 2025-04-24 PROCEDURE — 84443 ASSAY THYROID STIM HORMONE: CPT

## 2025-04-24 PROCEDURE — 80061 LIPID PANEL: CPT

## 2025-04-24 PROCEDURE — 85652 RBC SED RATE AUTOMATED: CPT

## 2025-04-24 PROCEDURE — 36415 COLL VENOUS BLD VENIPUNCTURE: CPT

## 2025-04-24 PROCEDURE — 80053 COMPREHEN METABOLIC PANEL: CPT

## 2025-04-24 RX ORDER — CEFUROXIME AXETIL 250 MG/1
250 TABLET ORAL EVERY 12 HOURS SCHEDULED
Qty: 10 TABLET | Refills: 0 | Status: SHIPPED | OUTPATIENT
Start: 2025-04-24 | End: 2025-04-29

## 2025-04-24 NOTE — PROGRESS NOTES
"Name: Grace Renner      : 1960      MRN: [id number]  Encounter Provider: Vandana Smyth PA-C  Encounter Date: 2025   Encounter department: [facility] [address]  :  Assessment & Plan  UTI symptoms  -notes pain at end of urination, blood in urine, urinary frequency, urgency x 10 days.  - Notes feeling feverish, did not have a fever. She took Azo OTC. Denies chance of STD. She notes urine has always had a strong odor, this is an ongoing issue. Denies itching and vaginal discharge.   -UC visit 2025, Culture demonstrated 10,000-19,000 cfu/ml Proteus mirabilis, pt has allergies listed for all PO medication options. Pt sent to PCP for medication mgmt.   -Consulted ID beatrice Middleton, pharmacist, recommendations, \"cefuroxime is still a safe option in the setting of penicillin and cephalexin allergies given differences in structural side chains (risk of cross-reactivity is <1% similar to non-beta-lactam alternatives)\"   -Discussed with Pt, ordered Ceftin 250 mg BID x 5 days  -Pt notes her reaction to Keflex was a yeast infection after taking medication. Discussed this not considered an allergic reaction but rather a complication of abx use. Reviewed s/sx of allergic rxn and advised any sign of a true allergic reaction, pt should stop medication and report to ED  -Placed referral to urology as well   Orders:    cefuroxime (CEFTIN) 250 mg tablet; Take 1 tablet (250 mg total) by mouth every 12 (twelve) hours for 5 days    Ambulatory Referral to Urology; Future           History of Present Illness     HERMELINDO Saavedra presents to the office for follow up from  about possible UTI. Culture demonstrated 10,000-19,000 cfu/ml Proteus mirabilis, pt has allergies listed for all PO medication options. Pt sent to PCP for medication mgmt.     Pt notes pain at end of urination, blood in urine, urinary frequency, urgency. She notes she is to a point that urgency is increasing " "x 10 days. Notes feeling feverish, did not have a fever. She took Azo OTC. Denies changes of STD. She notes urine has always had a strong odor, this is an ongoing issue. Denies itching and vaginal discharge.     Consulted ID pharm and on call. Patrick Middleton recommendations, \"cefuroxime is still a safe option in the setting of penicillin and cephalexin allergies given differences in structural side chains (risk of cross-reactivity is <1% similar to non-beta-lactam alternatives)\"         Review of Systems   Constitutional:  Negative for activity change, appetite change, fatigue and fever.   HENT:  Negative for congestion, ear pain, rhinorrhea and sore throat.    Eyes:  Negative for pain.   Respiratory:  Negative for cough and shortness of breath.    Cardiovascular:  Negative for chest pain and leg swelling.   Gastrointestinal:  Negative for abdominal distention, abdominal pain, constipation, diarrhea, nausea and vomiting.   Genitourinary:  Positive for frequency, hematuria, pelvic pain and urgency. Negative for decreased urine volume, dysuria, flank pain, vaginal bleeding, vaginal discharge and vaginal pain.   Musculoskeletal:  Negative for gait problem.   Skin:  Negative for rash.   Neurological:  Negative for dizziness, light-headedness and headaches.       Objective   /64 (BP Location: Left arm, Patient Position: Sitting, Cuff Size: Standard)   Pulse 96   Temp 97.6 °F (36.4 °C) (Temporal)   Ht 5' 3\" (1.6 m)   Wt 64.4 kg (142 lb)   SpO2 98%   BMI 25.15 kg/m²      Physical Exam  Vitals reviewed.   Constitutional:       General: She is not in acute distress.     Appearance: Normal appearance.   HENT:      Head: Normocephalic and atraumatic.      Right Ear: External ear normal.      Left Ear: External ear normal.      Nose: Nose normal.      Mouth/Throat:      Mouth: Mucous membranes are moist.   Eyes:      Extraocular Movements: Extraocular movements intact.      Conjunctiva/sclera: Conjunctivae normal.      " Pupils: Pupils are equal, round, and reactive to light.   Cardiovascular:      Rate and Rhythm: Normal rate and regular rhythm.      Heart sounds: Normal heart sounds.   Pulmonary:      Effort: Pulmonary effort is normal.      Breath sounds: Normal breath sounds. No wheezing, rhonchi or rales.   Abdominal:      General: Bowel sounds are normal. There is no distension.      Palpations: Abdomen is soft.      Tenderness: There is abdominal tenderness in the suprapubic area. There is no right CVA tenderness or left CVA tenderness.   Musculoskeletal:      Cervical back: Neck supple.      Right lower leg: No edema.      Left lower leg: No edema.   Lymphadenopathy:      Cervical: No cervical adenopathy.   Skin:     General: Skin is warm.      Capillary Refill: Capillary refill takes less than 2 seconds.      Findings: No rash.   Neurological:      Mental Status: She is alert. Mental status is at baseline.         Vandana Smyth PA-C  [facility]  4/24/2025 3:21 PM

## 2025-04-24 NOTE — RESULT ENCOUNTER NOTE
Attempted to call regarding positive urine culture results. No Answer. Left generic message. Was symptomatic. Started on doxy. Need to see if still having symptoms given low colony count. If symptoms persist, need different abx.

## 2025-04-24 NOTE — RESULT ENCOUNTER NOTE
Called and spoke with patient. She never started doxy. UC shows resistant anyway. Allergic to other po options. Still symptomatic. Recommend follow up with PCP to set up outpatient infusion or injections.

## 2025-05-14 DIAGNOSIS — J44.9 CHRONIC OBSTRUCTIVE PULMONARY DISEASE, UNSPECIFIED COPD TYPE (HCC): ICD-10-CM

## 2025-05-14 RX ORDER — ALBUTEROL SULFATE 90 UG/1
2 AEROSOL, METERED RESPIRATORY (INHALATION) EVERY 4 HOURS PRN
Qty: 18 G | Refills: 2 | Status: SHIPPED | OUTPATIENT
Start: 2025-05-14

## 2025-05-31 DIAGNOSIS — J44.9 CHRONIC OBSTRUCTIVE PULMONARY DISEASE, UNSPECIFIED COPD TYPE (HCC): ICD-10-CM

## 2025-05-31 DIAGNOSIS — F51.01 PRIMARY INSOMNIA: ICD-10-CM

## 2025-06-01 RX ORDER — MONTELUKAST SODIUM 10 MG/1
10 TABLET ORAL
Qty: 90 TABLET | Refills: 1 | Status: SHIPPED | OUTPATIENT
Start: 2025-06-01

## 2025-06-01 RX ORDER — TRAZODONE HYDROCHLORIDE 50 MG/1
50 TABLET ORAL
Qty: 90 TABLET | Refills: 1 | Status: SHIPPED | OUTPATIENT
Start: 2025-06-01

## 2025-06-05 ENCOUNTER — RESULTS FOLLOW-UP (OUTPATIENT)
Dept: FAMILY MEDICINE CLINIC | Facility: CLINIC | Age: 65
End: 2025-06-05

## 2025-06-29 DIAGNOSIS — K21.9 GASTROESOPHAGEAL REFLUX DISEASE, UNSPECIFIED WHETHER ESOPHAGITIS PRESENT: ICD-10-CM

## 2025-07-01 RX ORDER — OMEPRAZOLE 40 MG/1
40 CAPSULE, DELAYED RELEASE ORAL DAILY
Qty: 90 CAPSULE | Refills: 1 | Status: SHIPPED | OUTPATIENT
Start: 2025-07-01

## 2025-07-19 DIAGNOSIS — E06.3 HYPOTHYROIDISM DUE TO HASHIMOTO'S THYROIDITIS: ICD-10-CM

## 2025-07-20 DIAGNOSIS — Z91.09 ENVIRONMENTAL ALLERGIES: ICD-10-CM

## 2025-07-20 RX ORDER — LEVOTHYROXINE SODIUM 112 UG/1
112 TABLET ORAL DAILY
Qty: 90 TABLET | Refills: 1 | Status: SHIPPED | OUTPATIENT
Start: 2025-07-20

## 2025-07-21 RX ORDER — FEXOFENADINE HCL 180 MG/1
180 TABLET ORAL DAILY
Qty: 90 TABLET | Refills: 1 | Status: SHIPPED | OUTPATIENT
Start: 2025-07-21

## (undated) DEVICE — GLOVE INDICATOR PI UNDERGLOVE SZ 7 BLUE

## (undated) DEVICE — CHLORAPREP HI-LITE 26ML ORANGE

## (undated) DEVICE — BETHLEHEM UNIVERSAL MINOR GEN: Brand: CARDINAL HEALTH

## (undated) DEVICE — FINGER TUBING + CABLE MANAGER

## (undated) DEVICE — CURITY PLAIN PACKING STRIP: Brand: CURITY

## (undated) DEVICE — SCD SEQUENTIAL COMPRESSION COMFORT SLEEVE MEDIUM KNEE LENGTH: Brand: KENDALL SCD

## (undated) DEVICE — LIGHT HANDLE COVER SLEEVE DISP BLUE STELLAR

## (undated) DEVICE — TUBING SUCTION 5MM X 12 FT

## (undated) DEVICE — 4-PORT MANIFOLD: Brand: NEPTUNE 2

## (undated) DEVICE — PLUMEPEN PRO 10FT

## (undated) DEVICE — BETHLEHEM UNIVERSAL OUTPATIENT: Brand: CARDINAL HEALTH

## (undated) DEVICE — DRAPE EQUIPMENT RF WAND

## (undated) DEVICE — PAD GROUNDING DUAL ADULT

## (undated) DEVICE — GAUZE SPONGES,8 PLY: Brand: CURITY

## (undated) DEVICE — 3M™ TEGADERM™ TRANSPARENT FILM DRESSING FRAME STYLE, 1624W, 2-3/8 IN X 2-3/4 IN (6 CM X 7 CM), 100/CT 4CT/CASE: Brand: 3M™ TEGADERM™

## (undated) DEVICE — 3M™ STERI-STRIP™ REINFORCED ADHESIVE SKIN CLOSURES, R1542, 1/4 IN X 1-1/2 IN (6 MM X 38 MM), 6 STRIPS/ENVELOPE: Brand: 3M™ STERI-STRIP™

## (undated) DEVICE — PAD GROUNDING ADULT

## (undated) DEVICE — GLOVE SRG BIOGEL 7

## (undated) DEVICE — INTENDED FOR TISSUE SEPARATION, AND OTHER PROCEDURES THAT REQUIRE A SHARP SURGICAL BLADE TO PUNCTURE OR CUT.: Brand: BARD-PARKER SAFETY BLADES SIZE 15, STERILE

## (undated) DEVICE — POOLE SUCTION HANDLE: Brand: CARDINAL HEALTH

## (undated) DEVICE — NEEDLE 25G X 1 1/2

## (undated) DEVICE — SPONGE STICK WITH PVP-I: Brand: KENDALL

## (undated) DEVICE — MINOR PROCEDURE DRAPE: Brand: CONVERTORS

## (undated) DEVICE — SUT VICRYL 0 UR-6 27 IN J603H